# Patient Record
Sex: FEMALE | Race: BLACK OR AFRICAN AMERICAN | NOT HISPANIC OR LATINO | Employment: OTHER | URBAN - METROPOLITAN AREA
[De-identification: names, ages, dates, MRNs, and addresses within clinical notes are randomized per-mention and may not be internally consistent; named-entity substitution may affect disease eponyms.]

---

## 2017-01-04 ENCOUNTER — APPOINTMENT (OUTPATIENT)
Dept: PREADMISSION TESTING | Facility: HOSPITAL | Age: 57
End: 2017-01-04

## 2017-01-04 VITALS
HEART RATE: 93 BPM | OXYGEN SATURATION: 97 % | HEIGHT: 63 IN | SYSTOLIC BLOOD PRESSURE: 148 MMHG | BODY MASS INDEX: 50.43 KG/M2 | RESPIRATION RATE: 20 BRPM | TEMPERATURE: 98 F | DIASTOLIC BLOOD PRESSURE: 90 MMHG | WEIGHT: 284.6 LBS

## 2017-01-04 DIAGNOSIS — M25.561 CHRONIC PAIN OF RIGHT KNEE: Primary | ICD-10-CM

## 2017-01-04 DIAGNOSIS — G89.29 CHRONIC PAIN OF RIGHT KNEE: Primary | ICD-10-CM

## 2017-01-04 LAB
ANION GAP SERPL CALCULATED.3IONS-SCNC: 12.6 MMOL/L
BACTERIA UR QL AUTO: ABNORMAL /HPF
BILIRUB UR QL STRIP: NEGATIVE
BUN BLD-MCNC: 22 MG/DL (ref 6–20)
BUN/CREAT SERPL: 21.8 (ref 7–25)
CALCIUM SPEC-SCNC: 9.6 MG/DL (ref 8.6–10.5)
CHLORIDE SERPL-SCNC: 101 MMOL/L (ref 98–107)
CLARITY UR: CLEAR
CO2 SERPL-SCNC: 24.4 MMOL/L (ref 22–29)
COLOR UR: YELLOW
CREAT BLD-MCNC: 1.01 MG/DL (ref 0.57–1)
DEPRECATED RDW RBC AUTO: 40.5 FL (ref 37–54)
ERYTHROCYTE [DISTWIDTH] IN BLOOD BY AUTOMATED COUNT: 13.4 % (ref 11.7–13)
GFR SERPL CREATININE-BSD FRML MDRD: 68 ML/MIN/1.73
GLUCOSE BLD-MCNC: 187 MG/DL (ref 65–99)
GLUCOSE UR STRIP-MCNC: NEGATIVE MG/DL
HBA1C MFR BLD: 6.9 % (ref 4.8–5.6)
HCT VFR BLD AUTO: 42.9 % (ref 35.6–45.5)
HGB BLD-MCNC: 15.3 G/DL (ref 11.9–15.5)
HGB UR QL STRIP.AUTO: NEGATIVE
HYALINE CASTS UR QL AUTO: ABNORMAL /LPF
KETONES UR QL STRIP: NEGATIVE
LEUKOCYTE ESTERASE UR QL STRIP.AUTO: NEGATIVE
MCH RBC QN AUTO: 29.8 PG (ref 26.9–32)
MCHC RBC AUTO-ENTMCNC: 35.7 G/DL (ref 32.4–36.3)
MCV RBC AUTO: 83.5 FL (ref 80.5–98.2)
NITRITE UR QL STRIP: NEGATIVE
PH UR STRIP.AUTO: <=5 [PH] (ref 5–8)
PLATELET # BLD AUTO: 361 10*3/MM3 (ref 140–500)
PMV BLD AUTO: 10.3 FL (ref 6–12)
POTASSIUM BLD-SCNC: 4.2 MMOL/L (ref 3.5–5.2)
PROT UR QL STRIP: ABNORMAL
RBC # BLD AUTO: 5.14 10*6/MM3 (ref 3.9–5.2)
RBC # UR: ABNORMAL /HPF
REF LAB TEST METHOD: ABNORMAL
SODIUM BLD-SCNC: 138 MMOL/L (ref 136–145)
SP GR UR STRIP: 1.02 (ref 1–1.03)
SQUAMOUS #/AREA URNS HPF: ABNORMAL /HPF
UROBILINOGEN UR QL STRIP: ABNORMAL
WBC NRBC COR # BLD: 9.27 10*3/MM3 (ref 4.5–10.7)
WBC UR QL AUTO: ABNORMAL /HPF

## 2017-01-04 PROCEDURE — 85027 COMPLETE CBC AUTOMATED: CPT | Performed by: ORTHOPAEDIC SURGERY

## 2017-01-04 PROCEDURE — 97001: CPT

## 2017-01-04 PROCEDURE — 36415 COLL VENOUS BLD VENIPUNCTURE: CPT

## 2017-01-04 PROCEDURE — G8980 MOBILITY D/C STATUS: HCPCS

## 2017-01-04 PROCEDURE — G8979 MOBILITY GOAL STATUS: HCPCS

## 2017-01-04 PROCEDURE — 93010 ELECTROCARDIOGRAM REPORT: CPT | Performed by: INTERNAL MEDICINE

## 2017-01-04 PROCEDURE — G8978 MOBILITY CURRENT STATUS: HCPCS

## 2017-01-04 PROCEDURE — 83036 HEMOGLOBIN GLYCOSYLATED A1C: CPT | Performed by: ORTHOPAEDIC SURGERY

## 2017-01-04 PROCEDURE — 81001 URINALYSIS AUTO W/SCOPE: CPT | Performed by: ORTHOPAEDIC SURGERY

## 2017-01-04 PROCEDURE — 93005 ELECTROCARDIOGRAM TRACING: CPT

## 2017-01-04 PROCEDURE — 80048 BASIC METABOLIC PNL TOTAL CA: CPT | Performed by: ORTHOPAEDIC SURGERY

## 2017-01-04 RX ORDER — SPIRONOLACTONE 25 MG/1
25 TABLET ORAL NIGHTLY
COMMUNITY
End: 2018-03-13

## 2017-01-04 RX ORDER — SPIRONOLACTONE 25 MG/1
TABLET ORAL EVERY MORNING
COMMUNITY
End: 2021-05-25 | Stop reason: SDUPTHER

## 2017-01-04 RX ORDER — NIFEDIPINE 60 MG/1
60 TABLET, EXTENDED RELEASE ORAL 2 TIMES DAILY
COMMUNITY
End: 2018-03-13

## 2017-01-04 RX ORDER — MELOXICAM 15 MG/1
15 TABLET ORAL EVERY MORNING
COMMUNITY
End: 2017-02-23 | Stop reason: HOSPADM

## 2017-01-04 RX ORDER — FUROSEMIDE 20 MG/1
20 TABLET ORAL AS NEEDED
COMMUNITY
End: 2020-12-14 | Stop reason: SDUPTHER

## 2017-01-04 RX ORDER — CHLORHEXIDINE GLUCONATE 500 MG/1
CLOTH TOPICAL
COMMUNITY
End: 2017-02-23 | Stop reason: HOSPADM

## 2017-01-04 RX ORDER — CHLORAL HYDRATE 500 MG
1000 CAPSULE ORAL
COMMUNITY
End: 2020-09-29

## 2017-01-04 NOTE — DISCHARGE INSTRUCTIONS
2% CHLORAHEXIDINE GLUCONATE* CLOTH  Preparing or “prepping” skin before surgery can reduce the risk of infection at the surgical site. To make the process easier, [facility] has chosen disposable cloths moistened with a rinse-free, 2% Chlorhexidine Gluconate (CHG) antiseptic solution. The steps below outline the prepping process and should be carefully followed.        Use the prep cloth on the area that is circled in the diagram             Directions Night before Surgery  1) Shower using a fresh bar of anti-bacterial soap (such as Dial) and clean washcloth.  Use a clean towel to completely dry your skin.  2) Do not use any lotions, oils or creams on your skin.  3) Open the package and remove 1 cloth, wipe your skin for 30 seconds in a circular motion.  Allow to dry for 3 minutes.  4) Repeat #3 with second cloth.  5) Do not touch your eyes, ears, or mouth with the prep cloth.  6) Allow the wet prep solution to air dry.  7) Discard the prep cloth and wash your hands with soap and water.   8) Dress in clean bed clothes and sleep on fresh clean bed sheets.   9) You may experience some temporary itching after the prep.    Directions Day of Surgery  1) Repeat steps 1,2,3,4,5,6,7, and 9.   2) Dress in clean clothes before coming to the hospital.    BACTROBAN NASAL OINTMENT  There are many germs normally in your nose. Bactroban is an ointment that will help reduce these germs. Please follow these instructions for Bactroban use:    ____Two days before surgery in the evening Date________    ____The day before surgery in the morning  Date________    ____The day before surgery in the evening              Date________    ____The day of surgery in the morning    Date________    **Squirt ½ package of Bactroban Ointment onto a cotton applicator and apply to inside of 1st nostril.  Squirt the remaining Bactroban and apply to the inside of the other nostril.        Take the following medications the morning of  surgery with a small sip of water.  NIFEDICAL    ARRIVAL TIME 1000 TO MAIN OR       General Instructions:  • Do not eat or drink after midnight: includes water, mints, or gum. You may brush your teeth.  • Do not smoke, chew tobacco, or drink alcohol.  • Bring medications in original bottles, any inhalers and if applicable your C-PAP/ BI-PAP machine.  • Bring any papers given to you in the doctor’s office.  • Wear clean comfortable clothes and socks.  • Do not wear contact lenses or make-up.  Bring a case for your glasses if applicable.   • Bring crutches or walker if applicable.  • Leave all other valuables and jewelry at home.        Preventing a Surgical Site Infection:  Shower on the morning of surgery using a fresh bar of anti-bacterial soap (such as Dial) and clean washcloth.  Dry with a clean towel and dress in clean clothing.  For 2 to 3 days before surgery, avoid shaving with a razor near where you will have surgery because the razor can irritate skin and make it easier to develop an infection  Ask your surgeon if you will be receiving antibiotics prior to surgery  Make sure you, your family, and all healthcare providers clean their hands with soap and water or an alcohol based hand  before caring for you or your wound  If at all possible, quit smoking as many days before surgery as you can.    Day of surgery:  Upon arrival, a Pre-op nurse and Anesthesiologist will review your health history, obtain vital signs, and answer questions you may have.  The only belongings needed at this time will be your home medications and if applicable your C-PAP/BI-PAP machine.  If you are staying overnight your family can leave the rest of your belongings in the car and bring them to your room later.  A Pre-op nurse will start an IV and you may receive medication in preparation for surgery, including something to help you relax.  Your family will be able to see you in the Pre-op area.  While you are in surgery your  family should notify the waiting room  if they leave the waiting room area and provide a contact phone number.    Please be aware that surgery does come with discomfort.  We want to make every effort to control your discomfort so please discuss any uncontrolled symptoms with your nurse.   Your doctor will most likely have prescribed pain medications.      If you are going home after surgery you will receive individualized written care instructions before being discharged.  A responsible adult must drive you to and from the hospital on the day of your surgery and stay with you for 24 hours.    If you are staying overnight following surgery, you will be transported to your hospital room following the recovery period.  [facility] has all private rooms.    If you have any questions please call Pre-Admission Testing at [phone].  Deductibles and co-payments are collected on the day of service. Please be prepared to pay the required co-pay, deductible or deposit on the day of service as defined by your plan.

## 2017-01-04 NOTE — PROGRESS NOTES
Physical Therapy Outpatient Preoperative Total Joint Evaluation     Patient Name: Katharine Mitchell  : 1960  MRN: [id number]  Today's Date: 2017         Surgery Date: 17    There is no problem list on file for this patient.       No past medical history on file.     No past surgical history on file.           TOTAL JOINT PREOP EVAL (last 72 hours)      Total Joint Preop Evaluation       17 0740                Preoperative Evaluation    Surgery TKR: Right  -TV        Surgery Date 17  -TV        Previous Total Joint No  -TV        Attended Class yes  -TV        Medical History HTN;DM  -TV        Medical History Comment gb; scope on l knee  -TV        Prior Activity Status    All Household independent  -TV        All Community independent  -TV        Gait independent   cane  -TV        Transfers independent  -TV         Other Family  -TV        DC Plans Home  -TV        Assist at home Other Family  -TV        Home Environment 1 story;basement;lives first floor  -TV        Exterior steps 1 rail   3  -TV        Interior steps 1 rail   14  -TV        Pain 0-10    Pain Level 7  -TV        Location r knee  -TV        LE Measurements - ROM    Hip Flexion - Right AROM is WNL;Strength is grossly > or equal to 3/5  -TV        Hip Abduction - Right AROM is WNL;Strength is grossly > or equal to 3/5  -TV        Knee Flexion - Right Strength is grossly > or equal to 3/5   85  -TV        Knee Extension - Right Strength is grossly > or equal to 3/5   -10  -TV        Hip Flexion - Left AROM is WNL;Strength is grossly > or equal to 3/5  -TV        Hip Abduction - Left AROM is WNL;Strength is grossly > or equal to 3/5  -TV        Knee Flexion - Left Strength is grossly > or equal to 3/5   95  -TV        Knee Extension - Left Strength is grossly > or equal to 3/5   0  -TV        UE ROM/Strength    UE ROM/Strength adequate for walker use  -TV        Other Measurements    Sensation/Circulation intact   -TV        Gait Observation cane  -TV        Equipment    Equipment Patient Has Walker;Cane  -TV        ASSESSMENT    Goal Patient demonstrates good understanding of post-op P.T.;Exercises;Surgical Procedure  -TV        Goal Met? Yes  -TV        Anticipated Progress good  -TV        Patient agrees with Plan of Treatment? Yes  -TV        Plan Will see for post op TJR protocol  -TV          User Key  (r) = Recorded By, (t) = Taken By, (c) = Cosigned By    Initials Name Effective Dates    TV Taty Tai, PT 01/07/16 -              Time Calculation:          Therapy Charges for Today     Code Description Service Date Service Provider Modifiers Qty    99210134982 HC PT MOBILITY CURRENT 1/4/2017 Taty Tai, PT GP, CI 1    44933156801 HC PT MOBILITY PROJECTED 1/4/2017 Taty Tai, PT GP, CI 1    67037984958 HC PT MOBILITY DISCHARGE 1/4/2017 Taty Tai, PT GP, CI 1    71136041597 HC PAT-TOTAL JOINT CLASS 1/4/2017 Taty Tai, PT  1    39269641670 HC PT EVAL 1 1/4/2017 Taty Tai, PT GP 1            PT G-Codes  PT Professional Judgement Used?: Yes  Functional Limitation: Mobility: Walking and moving around  Mobility: Walking and Moving Around Current Status (): At least 1 percent but less than 20 percent impaired, limited or restricted  Mobility: Walking and Moving Around Goal Status (): At least 1 percent but less than 20 percent impaired, limited or restricted  Mobility: Walking and Moving Around Discharge Status (): At least 1 percent but less than 20 percent impaired, limited or restricted      Taty Tai, PT  1/4/2017

## 2017-01-04 NOTE — MR AVS SNAPSHOT
Katharine Mitchell   2017 7:00 AM   Appointment    Provider:  ADAM Loving   Department:  [facility] PREADMISSION T   Dept Phone:  [phone]                Your Full Care Plan              Your Updated Medication List          This list is accurate as of: 17  8:08 AM.  Always use your most recent med list.                Chlorhexidine Gluconate Cloth 2 % pads       fish oil 1000 MG capsule capsule       furosemide 20 MG tablet   Commonly known as:  LASIX       meloxicam 15 MG tablet   Commonly known as:  MOBIC       metFORMIN 500 MG tablet   Commonly known as:  GLUCOPHAGE       MULTIVITAMIN ADULT PO       mupirocin 2 % nasal ointment   Commonly known as:  BACTROBAN       NIFEdipine XL 60 MG 24 hr tablet   Commonly known as:  PROCARDIA XL       * spironolactone 25 MG tablet   Commonly known as:  ALDACTONE       * spironolactone 25 MG tablet   Commonly known as:  ALDACTONE       * Notice:  This list has 2 medication(s) that are the same as other medications prescribed for you. Read the directions carefully, and ask your doctor or other care provider to review them with you.            [employer] Signup     [facility] [employer] allows you to send messages to your doctor, view your test results, renew your prescriptions, schedule appointments, and more. To sign up, go to TRELYS and click on the Sign Up Now link in the New User? box. Enter your [employer] Activation Code exactly as it appears below along with the last four digits of your Social Security Number and your Date of Birth () to complete the sign-up process. If you do not sign up before the expiration date, you must request a new code.    [employer] Activation Code: [email or web address]  Expires: 2017  8:08 AM    If you have questions, you can email [email or web address] or call [phone] to talk to our [employer] staff. Remember, [employer] is NOT to be used for urgent needs. For medical  "emergencies, dial 911.               Other Info from Your Visit           Allergies     Zestril [Lisinopril] Swelling    LIP/ MOUTH SWELLING      Vital Signs     Blood Pressure Pulse Temperature Respirations Height Weight    148/90 (BP Location: Left arm, Patient Position: Sitting) 93 98 °F (36.7 °C) (Oral) 20 63\" (160 cm) 284 lb 9.6 oz (129 kg)    Oxygen Saturation Body Mass Index Smoking Status             97% 50.41 kg/m2 Never Smoker           Discharge Instructions         2% CHLORAHEXIDINE GLUCONATE* CLOTH  Preparing or “prepping” skin before surgery can reduce the risk of infection at the surgical site. To make the process easier, [facility] has chosen disposable cloths moistened with a rinse-free, 2% Chlorhexidine Gluconate (CHG) antiseptic solution. The steps below outline the prepping process and should be carefully followed.        Use the prep cloth on the area that is circled in the diagram             Directions Night before Surgery  1) Shower using a fresh bar of anti-bacterial soap (such as Dial) and clean washcloth.  Use a clean towel to completely dry your skin.  2) Do not use any lotions, oils or creams on your skin.  3) Open the package and remove 1 cloth, wipe your skin for 30 seconds in a circular motion.  Allow to dry for 3 minutes.  4) Repeat #3 with second cloth.  5) Do not touch your eyes, ears, or mouth with the prep cloth.  6) Allow the wet prep solution to air dry.  7) Discard the prep cloth and wash your hands with soap and water.   8) Dress in clean bed clothes and sleep on fresh clean bed sheets.   9) You may experience some temporary itching after the prep.    Directions Day of Surgery  1) Repeat steps 1,2,3,4,5,6,7, and 9.   2) Dress in clean clothes before coming to the hospital.    BACTROBAN NASAL OINTMENT  There are many germs normally in your nose. Bactroban is an ointment that will help reduce these germs. Please follow these instructions for Bactroban " use:    ____Two days before surgery in the evening Date________    ____The day before surgery in the morning  Date________    ____The day before surgery in the evening              Date________    ____The day of surgery in the morning    Date________    **Squirt ½ package of Bactroban Ointment onto a cotton applicator and apply to inside of 1st nostril.  Squirt the remaining Bactroban and apply to the inside of the other nostril.        Take the following medications the morning of surgery with a small sip of water.  NIFEDICAL    ARRIVAL TIME 1000 TO MAIN OR       General Instructions:  • Do not eat or drink after midnight: includes water, mints, or gum. You may brush your teeth.  • Do not smoke, chew tobacco, or drink alcohol.  • Bring medications in original bottles, any inhalers and if applicable your C-PAP/ BI-PAP machine.  • Bring any papers given to you in the doctor’s office.  • Wear clean comfortable clothes and socks.  • Do not wear contact lenses or make-up.  Bring a case for your glasses if applicable.   • Bring crutches or walker if applicable.  • Leave all other valuables and jewelry at home.        Preventing a Surgical Site Infection:  Shower on the morning of surgery using a fresh bar of anti-bacterial soap (such as Dial) and clean washcloth.  Dry with a clean towel and dress in clean clothing.  For 2 to 3 days before surgery, avoid shaving with a razor near where you will have surgery because the razor can irritate skin and make it easier to develop an infection  Ask your surgeon if you will be receiving antibiotics prior to surgery  Make sure you, your family, and all healthcare providers clean their hands with soap and water or an alcohol based hand  before caring for you or your wound  If at all possible, quit smoking as many days before surgery as you can.    Day of surgery:  Upon arrival, a Pre-op nurse and Anesthesiologist will review your health history, obtain vital signs, and  answer questions you may have.  The only belongings needed at this time will be your home medications and if applicable your C-PAP/BI-PAP machine.  If you are staying overnight your family can leave the rest of your belongings in the car and bring them to your room later.  A Pre-op nurse will start an IV and you may receive medication in preparation for surgery, including something to help you relax.  Your family will be able to see you in the Pre-op area.  While you are in surgery your family should notify the waiting room  if they leave the waiting room area and provide a contact phone number.    Please be aware that surgery does come with discomfort.  We want to make every effort to control your discomfort so please discuss any uncontrolled symptoms with your nurse.   Your doctor will most likely have prescribed pain medications.      If you are going home after surgery you will receive individualized written care instructions before being discharged.  A responsible adult must drive you to and from the hospital on the day of your surgery and stay with you for 24 hours.    If you are staying overnight following surgery, you will be transported to your hospital room following the recovery period.  [facility] has all private rooms.    If you have any questions please call Pre-Admission Testing at [phone].  Deductibles and co-payments are collected on the day of service. Please be prepared to pay the required co-pay, deductible or deposit on the day of service as defined by your plan.       SYMPTOMS OF A STROKE    Call [phone] or have someone take you to the Emergency Department if you have any of the following:    · Sudden numbness or weakness of your face, arm or leg especially on one side of the body  · Sudden confusion, diffiiculty speaking or trouble understanding   · Changes in your vision or loss of sight in one eye  · Sudden severe headache with no known cause  · sudden dizziness,  trouble walking, loss of balance or coordination    It is important to seek emergency care right away if you have further stroke symptoms. If you get emergency help quickly, the powerful clot-dissolving medicines can reduce the disabilities caused by a stroke.     For more information:    American Stroke Association  9-914-8-STROKE  www.strokeassociation.org           IF YOU SMOKE OR USE TOBACCO PLEASE READ THE FOLLOWING:    Why is smoking bad for me?  Smoking increases the risk of heart disease, lung disease, vascular disease, stroke, and cancer.     If you smoke, STOP!    If you would like more information on quitting smoking, please visit the [employer] website: [email or web address]   This link will provide additional resources including the QUIT line and the Beat the Pack support groups.     For more information:    American Cancer Society  (923) 674-8783    American Heart Association  4-727-433-1034

## 2017-01-09 ENCOUNTER — TRANSCRIBE ORDERS (OUTPATIENT)
Dept: ADMINISTRATIVE | Facility: HOSPITAL | Age: 57
End: 2017-01-09

## 2017-01-09 ENCOUNTER — LAB (OUTPATIENT)
Dept: LAB | Facility: HOSPITAL | Age: 57
End: 2017-01-09
Attending: ORTHOPAEDIC SURGERY

## 2017-01-09 ENCOUNTER — OFFICE VISIT (OUTPATIENT)
Dept: CARDIOLOGY | Facility: CLINIC | Age: 57
End: 2017-01-09

## 2017-01-09 VITALS
HEIGHT: 63 IN | BODY MASS INDEX: 50.32 KG/M2 | SYSTOLIC BLOOD PRESSURE: 124 MMHG | DIASTOLIC BLOOD PRESSURE: 76 MMHG | HEART RATE: 91 BPM | WEIGHT: 284 LBS

## 2017-01-09 DIAGNOSIS — R06.02 SOB (SHORTNESS OF BREATH): ICD-10-CM

## 2017-01-09 DIAGNOSIS — I10 ESSENTIAL HYPERTENSION: ICD-10-CM

## 2017-01-09 DIAGNOSIS — R89.9 ABNORMAL LABORATORY TEST: Primary | ICD-10-CM

## 2017-01-09 DIAGNOSIS — R89.9 ABNORMAL LABORATORY TEST: ICD-10-CM

## 2017-01-09 DIAGNOSIS — Z01.810 PREOP CARDIOVASCULAR EXAM: ICD-10-CM

## 2017-01-09 DIAGNOSIS — R94.31 ABNORMAL EKG: ICD-10-CM

## 2017-01-09 DIAGNOSIS — Z01.810 PREOP CARDIOVASCULAR EXAM: Primary | ICD-10-CM

## 2017-01-09 DIAGNOSIS — R94.31 ABNORMAL EKG: Primary | ICD-10-CM

## 2017-01-09 LAB
BACTERIA UR QL AUTO: ABNORMAL /HPF
BILIRUB UR QL STRIP: NEGATIVE
CLARITY UR: CLEAR
COLOR UR: ABNORMAL
GLUCOSE UR STRIP-MCNC: NEGATIVE MG/DL
HGB UR QL STRIP.AUTO: NEGATIVE
HYALINE CASTS UR QL AUTO: ABNORMAL /LPF
KETONES UR QL STRIP: ABNORMAL
LEUKOCYTE ESTERASE UR QL STRIP.AUTO: ABNORMAL
NITRITE UR QL STRIP: NEGATIVE
PH UR STRIP.AUTO: <=5 [PH] (ref 5–8)
PROT UR QL STRIP: ABNORMAL
RBC # UR: ABNORMAL /HPF
REF LAB TEST METHOD: ABNORMAL
SP GR UR STRIP: 1.03 (ref 1–1.03)
SQUAMOUS #/AREA URNS HPF: ABNORMAL /HPF
UROBILINOGEN UR QL STRIP: ABNORMAL
WBC UR QL AUTO: ABNORMAL /HPF

## 2017-01-09 PROCEDURE — 99213 OFFICE O/P EST LOW 20 MIN: CPT | Performed by: INTERNAL MEDICINE

## 2017-01-09 PROCEDURE — 81001 URINALYSIS AUTO W/SCOPE: CPT

## 2017-01-09 NOTE — MR AVS SNAPSHOT
Katharine Mitchell   1/9/2017 3:30 PM   Office Visit    Dept Phone:  [phone]   Encounter #:  [id number]    Provider:  Adrian Pottre MD   Department:  [facility]                Your Full Care Plan              Your Updated Medication List          This list is accurate as of: 1/9/17  4:43 PM.  Always use your most recent med list.                Chlorhexidine Gluconate Cloth 2 % pads       fish oil 1000 MG capsule capsule       furosemide 20 MG tablet   Commonly known as:  LASIX       meloxicam 15 MG tablet   Commonly known as:  MOBIC       metFORMIN 500 MG tablet   Commonly known as:  GLUCOPHAGE       MULTIVITAMIN ADULT PO       mupirocin 2 % nasal ointment   Commonly known as:  BACTROBAN       NIFEdipine XL 60 MG 24 hr tablet   Commonly known as:  PROCARDIA XL       * spironolactone 25 MG tablet   Commonly known as:  ALDACTONE       * spironolactone 25 MG tablet   Commonly known as:  ALDACTONE       WAL-DRYL ALLERGY PO       * Notice:  This list has 2 medication(s) that are the same as other medications prescribed for you. Read the directions carefully, and ask your doctor or other care provider to review them with you.            You Were Diagnosed With        Codes Comments    Abnormal EKG    -  Primary ICD-10-CM: R94.31  ICD-9-CM: 794.31     Essential hypertension     ICD-10-CM: I10  ICD-9-CM: 401.9     Preop cardiovascular exam     ICD-10-CM: Z01.810  ICD-9-CM: V72.81       Instructions     None    Patient Instructions History      Upcoming Appointments     Visit Type Date Time Department    NEW PATIENT 1/9/2017  3:30 PM [facility]    LAB 1/9/2017  4:15 PM  [facility]      GT[employer] Signup     [facility] [employer] allows you to send messages to your doctor, view your test results, renew your prescriptions, schedule appointments, and more. To sign up, go to [email or web address] and click on the Sign Up Now  "link in the New User? box. Enter your [employer] Activation Code exactly as it appears below along with the last four digits of your Social Security Number and your Date of Birth () to complete the sign-up process. If you do not sign up before the expiration date, you must request a new code.    [employer] Activation Code: [email or web address]  Expires: 2017  8:08 AM    If you have questions, you can email [email or web address] or call [phone] to talk to our [employer] staff. Remember, [employer] is NOT to be used for urgent needs. For medical emergencies, dial 911.               Other Info from Your Visit           Allergies     Zestril [Lisinopril]  Swelling    LIP/ MOUTH SWELLING      Reason for Visit     Initial Evaluation Abnormal ECG      Vital Signs     Blood Pressure Pulse Height Weight Body Mass Index Smoking Status    124/76 91 63\" (160 cm) 284 lb (129 kg) 50.31 kg/m2 Never Smoker      Problems and Diagnoses Noted     Abnormal EKG    High blood pressure    Pre-operative cardiovascular examination        "

## 2017-01-09 NOTE — PROGRESS NOTES
" Subjective:       Katharine Mitchell is a 56 y.o. female who here for follow up    CC  Preop clearance for the knee surgery  HPI  56-year-old white female with known history of the hypertension, obesity, needs a knee surgery, as a preop evaluation patient was found to have abnormal EKG, patient has been referred to us for the further workup, also risk factors includes the diabetes and sleep apnea     Problem List Items Addressed This Visit        Cardiovascular and Mediastinum    Essential hypertension       Other    Abnormal EKG - Primary    Preop cardiovascular exam        Previous treatments/evaluations include: ASA and beta blocker. Cardiac risk factors: advanced age (older than 55 for men, 65 for women) and hypertension.    The following portions of the patient's history were reviewed and updated as appropriate: allergies, current medications, past family history, past medical history, past social history, past surgical history and problem list.    Past Medical History   Diagnosis Date   • Arthritis    • Diabetes mellitus    • Hypertension    • Sleep apnea      DOES NOT WEAR CPAP    reports that she has never smoked. She does not have any smokeless tobacco history on file. She reports that she drinks alcohol. She reports that she does not use illicit drugs.No family history on file.    Review of Systems  Constitutional: No wt loss, fever, fatigue  Gastrointestinal: No nausea, abdominal pain  Behavioral/Psych: No insomnia or anxiety   Cardiovascular no chest pains or tightness in the chest  Objective:       Physical Exam           Physical Exam  Visit Vitals   • /76   • Pulse 91   • Ht 63\" (160 cm)   • Wt 284 lb (129 kg)   • BMI 50.31 kg/m2     General appearance: alert, appears stated age and cooperative, oriented x 3, morbidly obese  Neck: no JVD and supple, symmetrical, trachea midline  Lungs: clear to auscultation bilaterally  Heart:Normal PMI,  S1, S2 normal, no murmur, rub or gallop  Extremities: " normal range of motion, no cyanosis or edema,  Pulses: 2+ and symmetric  Skin: Skin color, texture, turgor normal. No rashes or lesions  Psych:  Pleasant and cooperative        Cardiographics  @Procedures    Echocardiogram:        Current Outpatient Prescriptions:   •  Chlorhexidine Gluconate Cloth 2 % pads, Apply  topically. PRIOR TO OR, Disp: , Rfl:   •  DiphenhydrAMINE HCl (WAL-DRYL ALLERGY PO), Take 1 tablet by mouth Daily., Disp: , Rfl:   •  furosemide (LASIX) 20 MG tablet, Take 20 mg by mouth As Needed (PRN SWELLING)., Disp: , Rfl:   •  meloxicam (MOBIC) 15 MG tablet, Take 15 mg by mouth Every Morning. HELD FOR OR, Disp: , Rfl:   •  metFORMIN (GLUCOPHAGE) 500 MG tablet, Take 500 mg by mouth 2 (Two) Times a Day With Meals., Disp: , Rfl:   •  Multiple Vitamins-Minerals (MULTIVITAMIN ADULT PO), Take 1 tablet by mouth Every Morning. HELD FOR OR, Disp: , Rfl:   •  mupirocin (BACTROBAN) 2 % nasal ointment, into each nostril 2 (Two) Times a Day. X3 DOSES PRIOR TO OR, Disp: , Rfl:   •  NIFEdipine XL (PROCARDIA XL) 60 MG 24 hr tablet, Take 60 mg by mouth 2 (Two) Times a Day., Disp: , Rfl:   •  Omega-3 Fatty Acids (FISH OIL) 1000 MG capsule capsule, Take 1,000 mg by mouth 2 (Two) Times a Day With Meals. HELD FOR OR, Disp: , Rfl:   •  spironolactone (ALDACTONE) 25 MG tablet, Take 50 mg by mouth Every Morning., Disp: , Rfl:   •  spironolactone (ALDACTONE) 25 MG tablet, Take 25 mg by mouth Every Night., Disp: , Rfl:    Assessment:        Patient Active Problem List   Diagnosis   • Abnormal EKG   • Essential hypertension   • Preop cardiovascular exam               Plan:       56-year-old -American female moderately obese, diabetes as well as some hypertension with significant cardiac risk factors with abnormal EKG will need the Lexiscan Cardiolite as well as echocardiogram prior to the surgery      ICD-10-CM ICD-9-CM   1. Abnormal EKG R94.31 794.31   2. Essential hypertension I10 401.9   3. Preop cardiovascular exam  Z01.810 V72.81     Will need LEXISCAN cardiolyte and echo  Prior to knee surg  COUNSELING:    Katharine Wilkerson was given to patient for the following topics: diagnostic results, risk factor reductions, impressions, risks and benefits of treatment options and importance of treatment compliance .       SMOKING COUNSELING:    Counseling given: Not Answered      EMR Dragon/Transcription disclaimer:   Much of this encounter note is an electronic transcription/translation of spoken language to printed text. The electronic translation of spoken language may permit erroneous, or at times, nonsensical words or phrases to be inadvertently transcribed; Although I have reviewed the note for such errors, some may still exist.

## 2017-01-18 ENCOUNTER — HOSPITAL ENCOUNTER (OUTPATIENT)
Dept: CARDIOLOGY | Facility: HOSPITAL | Age: 57
Discharge: HOME | End: 2017-01-18
Attending: INTERNAL MEDICINE | Admitting: INTERNAL MEDICINE

## 2017-01-18 ENCOUNTER — HOSPITAL ENCOUNTER (OUTPATIENT)
Dept: CARDIOLOGY | Facility: HOSPITAL | Age: 57
Discharge: HOME | End: 2017-01-18
Attending: INTERNAL MEDICINE

## 2017-01-18 ENCOUNTER — TRANSCRIBE ORDERS (OUTPATIENT)
Dept: ADMINISTRATIVE | Facility: HOSPITAL | Age: 57
End: 2017-01-18

## 2017-01-18 ENCOUNTER — LAB (OUTPATIENT)
Dept: LAB | Facility: HOSPITAL | Age: 57
End: 2017-01-18
Attending: ORTHOPAEDIC SURGERY

## 2017-01-18 VITALS
DIASTOLIC BLOOD PRESSURE: 76 MMHG | WEIGHT: 284 LBS | BODY MASS INDEX: 50.32 KG/M2 | SYSTOLIC BLOOD PRESSURE: 124 MMHG | HEIGHT: 63 IN

## 2017-01-18 VITALS — DIASTOLIC BLOOD PRESSURE: 85 MMHG | HEART RATE: 69 BPM | SYSTOLIC BLOOD PRESSURE: 130 MMHG

## 2017-01-18 DIAGNOSIS — N39.0 URINARY TRACT INFECTION, SITE UNSPECIFIED: Primary | ICD-10-CM

## 2017-01-18 DIAGNOSIS — R94.31 ABNORMAL EKG: ICD-10-CM

## 2017-01-18 DIAGNOSIS — R06.02 SOB (SHORTNESS OF BREATH): ICD-10-CM

## 2017-01-18 DIAGNOSIS — Z01.810 PREOP CARDIOVASCULAR EXAM: ICD-10-CM

## 2017-01-18 DIAGNOSIS — N39.0 URINARY TRACT INFECTION, SITE UNSPECIFIED: ICD-10-CM

## 2017-01-18 LAB
BACTERIA UR QL AUTO: ABNORMAL /HPF
BH CV ECHO MEAS - ACS: 1.8 CM
BH CV ECHO MEAS - AO MAX PG (FULL): 5.3 MMHG
BH CV ECHO MEAS - AO MAX PG: 10.1 MMHG
BH CV ECHO MEAS - AO MEAN PG (FULL): 3 MMHG
BH CV ECHO MEAS - AO MEAN PG: 6 MMHG
BH CV ECHO MEAS - AO ROOT AREA (BSA CORRECTED): 1.3
BH CV ECHO MEAS - AO ROOT AREA: 7.1 CM^2
BH CV ECHO MEAS - AO ROOT DIAM: 3 CM
BH CV ECHO MEAS - AO V2 MAX: 159 CM/SEC
BH CV ECHO MEAS - AO V2 MEAN: 109 CM/SEC
BH CV ECHO MEAS - AO V2 VTI: 29.6 CM
BH CV ECHO MEAS - AVA(I,A): 2.7 CM^2
BH CV ECHO MEAS - AVA(I,D): 2.7 CM^2
BH CV ECHO MEAS - AVA(V,A): 2.6 CM^2
BH CV ECHO MEAS - AVA(V,D): 2.6 CM^2
BH CV ECHO MEAS - BSA(HAYCOCK): 2.5 M^2
BH CV ECHO MEAS - BSA: 2.2 M^2
BH CV ECHO MEAS - BZI_BMI: 50.3 KILOGRAMS/M^2
BH CV ECHO MEAS - BZI_METRIC_HEIGHT: 160 CM
BH CV ECHO MEAS - BZI_METRIC_WEIGHT: 128.8 KG
BH CV ECHO MEAS - CONTRAST EF 4CH: 56.5 ML/M^2
BH CV ECHO MEAS - EDV(CUBED): 148.9 ML
BH CV ECHO MEAS - EDV(MOD-SP4): 85 ML
BH CV ECHO MEAS - EDV(TEICH): 135.3 ML
BH CV ECHO MEAS - EF(CUBED): 75.9 %
BH CV ECHO MEAS - EF(MOD-SP4): 56.5 %
BH CV ECHO MEAS - EF(TEICH): 67.4 %
BH CV ECHO MEAS - ESV(CUBED): 35.9 ML
BH CV ECHO MEAS - ESV(MOD-SP4): 37 ML
BH CV ECHO MEAS - ESV(TEICH): 44.1 ML
BH CV ECHO MEAS - FS: 37.7 %
BH CV ECHO MEAS - IVS/LVPW: 1
BH CV ECHO MEAS - IVSD: 1.1 CM
BH CV ECHO MEAS - LA DIMENSION: 4.3 CM
BH CV ECHO MEAS - LA/AO: 1.4
BH CV ECHO MEAS - LAT PEAK E' VEL: 8.7 CM/SEC
BH CV ECHO MEAS - LV DIASTOLIC VOL/BSA (35-75): 37.9 ML/M^2
BH CV ECHO MEAS - LV MASS(C)D: 227.7 GRAMS
BH CV ECHO MEAS - LV MASS(C)DI: 101.5 GRAMS/M^2
BH CV ECHO MEAS - LV MAX PG: 4.8 MMHG
BH CV ECHO MEAS - LV MEAN PG: 3 MMHG
BH CV ECHO MEAS - LV SYSTOLIC VOL/BSA (12-30): 16.5 ML/M^2
BH CV ECHO MEAS - LV V1 MAX: 110 CM/SEC
BH CV ECHO MEAS - LV V1 MEAN: 75.5 CM/SEC
BH CV ECHO MEAS - LV V1 VTI: 21 CM
BH CV ECHO MEAS - LVIDD: 5.3 CM
BH CV ECHO MEAS - LVIDS: 3.3 CM
BH CV ECHO MEAS - LVLD AP4: 6.7 CM
BH CV ECHO MEAS - LVLS AP4: 6.2 CM
BH CV ECHO MEAS - LVOT AREA (M): 3.8 CM^2
BH CV ECHO MEAS - LVOT AREA: 3.8 CM^2
BH CV ECHO MEAS - LVOT DIAM: 2.2 CM
BH CV ECHO MEAS - LVPWD: 1.1 CM
BH CV ECHO MEAS - MED PEAK E' VEL: 4.4 CM/SEC
BH CV ECHO MEAS - MR MAX PG: 110.7 MMHG
BH CV ECHO MEAS - MR MAX VEL: 526 CM/SEC
BH CV ECHO MEAS - MV A DUR: 0.18 SEC
BH CV ECHO MEAS - MV A MAX VEL: 112 CM/SEC
BH CV ECHO MEAS - MV DEC SLOPE: 234 CM/SEC^2
BH CV ECHO MEAS - MV DEC TIME: 0.27 SEC
BH CV ECHO MEAS - MV E MAX VEL: 61.1 CM/SEC
BH CV ECHO MEAS - MV E/A: 0.55
BH CV ECHO MEAS - MV MAX PG: 4.6 MMHG
BH CV ECHO MEAS - MV MEAN PG: 1 MMHG
BH CV ECHO MEAS - MV P1/2T MAX VEL: 63.5 CM/SEC
BH CV ECHO MEAS - MV P1/2T: 79.5 MSEC
BH CV ECHO MEAS - MV V2 MAX: 107 CM/SEC
BH CV ECHO MEAS - MV V2 MEAN: 53.7 CM/SEC
BH CV ECHO MEAS - MV V2 VTI: 24.9 CM
BH CV ECHO MEAS - MVA P1/2T LCG: 3.5 CM^2
BH CV ECHO MEAS - MVA(P1/2T): 2.8 CM^2
BH CV ECHO MEAS - MVA(VTI): 3.2 CM^2
BH CV ECHO MEAS - PA MAX PG (FULL): 3.7 MMHG
BH CV ECHO MEAS - PA MAX PG: 4.8 MMHG
BH CV ECHO MEAS - PA V2 MAX: 110 CM/SEC
BH CV ECHO MEAS - PULM A REVS DUR: 0.14 SEC
BH CV ECHO MEAS - PULM A REVS VEL: 28.9 CM/SEC
BH CV ECHO MEAS - PULM DIAS VEL: 31.2 CM/SEC
BH CV ECHO MEAS - PULM S/D: 1.2
BH CV ECHO MEAS - PULM SYS VEL: 38.1 CM/SEC
BH CV ECHO MEAS - PVA(V,A): 1.7 CM^2
BH CV ECHO MEAS - PVA(V,D): 1.7 CM^2
BH CV ECHO MEAS - QP/QS: 0.48
BH CV ECHO MEAS - RAP SYSTOLE: 10 MMHG
BH CV ECHO MEAS - RV MAX PG: 1.1 MMHG
BH CV ECHO MEAS - RV MEAN PG: 1 MMHG
BH CV ECHO MEAS - RV V1 MAX: 52.5 CM/SEC
BH CV ECHO MEAS - RV V1 MEAN: 42.2 CM/SEC
BH CV ECHO MEAS - RV V1 VTI: 11 CM
BH CV ECHO MEAS - RVDD: 2.4 CM
BH CV ECHO MEAS - RVOT AREA: 3.5 CM^2
BH CV ECHO MEAS - RVOT DIAM: 2.1 CM
BH CV ECHO MEAS - RVSP: 29.7 MMHG
BH CV ECHO MEAS - SI(AO): 93.2 ML/M^2
BH CV ECHO MEAS - SI(CUBED): 50.3 ML/M^2
BH CV ECHO MEAS - SI(LVOT): 35.6 ML/M^2
BH CV ECHO MEAS - SI(MOD-SP4): 21.4 ML/M^2
BH CV ECHO MEAS - SI(TEICH): 40.6 ML/M^2
BH CV ECHO MEAS - SV(AO): 209.2 ML
BH CV ECHO MEAS - SV(CUBED): 112.9 ML
BH CV ECHO MEAS - SV(LVOT): 79.8 ML
BH CV ECHO MEAS - SV(MOD-SP4): 48 ML
BH CV ECHO MEAS - SV(RVOT): 38.1 ML
BH CV ECHO MEAS - SV(TEICH): 91.2 ML
BH CV ECHO MEAS - TAPSE (>1.6): 2.1 CM2
BH CV ECHO MEAS - TR MAX VEL: 222 CM/SEC
BH CV NUCLEAR PRIOR STUDY: 2
BH CV STRESS COMMENTS STAGE 1: NORMAL
BH CV STRESS COMMENTS STAGE 2: NORMAL
BH CV STRESS DOSE REGADENOSON STAGE 1: 0.4
BH CV STRESS DURATION MIN STAGE 1: 0
BH CV STRESS DURATION MIN STAGE 2: 4
BH CV STRESS DURATION SEC STAGE 1: 15
BH CV STRESS DURATION SEC STAGE 2: 0
BH CV STRESS HR STAGE 1: 100
BH CV STRESS PROTOCOL 1: NORMAL
BH CV STRESS RECOVERY BP: NORMAL MMHG
BH CV STRESS RECOVERY HR: 87 BPM
BH CV STRESS STAGE 1: 1
BH CV STRESS STAGE 2: 2
BH CV XLRA - RV BASE: 3.1 CM
BH CV XLRA - RV LENGTH: 6.4 CM
BH CV XLRA - RV MID: 2.6 CM
BH CV XLRA - TDI S': 15.2 CM/SEC
BILIRUB UR QL STRIP: NEGATIVE
CLARITY UR: CLEAR
COLOR UR: YELLOW
GLUCOSE UR STRIP-MCNC: NEGATIVE MG/DL
HGB UR QL STRIP.AUTO: NEGATIVE
HYALINE CASTS UR QL AUTO: ABNORMAL /LPF
KETONES UR QL STRIP: NEGATIVE
LEFT ATRIUM VOLUME INDEX: 35.3 ML/M2
LEUKOCYTE ESTERASE UR QL STRIP.AUTO: ABNORMAL
LV EF NUC BP: 59 %
MAXIMAL PREDICTED HEART RATE: 163 BPM
NITRITE UR QL STRIP: NEGATIVE
PERCENT MAX PREDICTED HR: 61.35 %
PH UR STRIP.AUTO: 5.5 [PH] (ref 5–8)
PROT UR QL STRIP: ABNORMAL
RBC # UR: ABNORMAL /HPF
REF LAB TEST METHOD: ABNORMAL
SP GR UR STRIP: 1.02 (ref 1–1.03)
SQUAMOUS #/AREA URNS HPF: ABNORMAL /HPF
STRESS BASELINE BP: NORMAL MMHG
STRESS BASELINE HR: 71 BPM
STRESS PERCENT HR: 72 %
STRESS POST ESTIMATED WORKLOAD: 1 METS
STRESS POST EXERCISE DUR MIN: 1 MIN
STRESS POST EXERCISE DUR SEC: 0 SEC
STRESS POST PEAK BP: NORMAL MMHG
STRESS POST PEAK HR: 100 BPM
STRESS TARGET HR: 139 BPM
UROBILINOGEN UR QL STRIP: ABNORMAL
WBC UR QL AUTO: ABNORMAL /HPF

## 2017-01-18 PROCEDURE — 81001 URINALYSIS AUTO W/SCOPE: CPT

## 2017-01-18 PROCEDURE — 78452 HT MUSCLE IMAGE SPECT MULT: CPT

## 2017-01-18 PROCEDURE — 0 TECHNETIUM SESTAMIBI: Performed by: INTERNAL MEDICINE

## 2017-01-18 PROCEDURE — 78452 HT MUSCLE IMAGE SPECT MULT: CPT | Performed by: INTERNAL MEDICINE

## 2017-01-18 PROCEDURE — 0399T HC MYOCARDL STRAIN IMAG QUAN ASSMT PER SESS: CPT

## 2017-01-18 PROCEDURE — 0399T ADULT TRANSTHORACIC ECHO COMPLETE: CPT | Performed by: INTERNAL MEDICINE

## 2017-01-18 PROCEDURE — 93017 CV STRESS TEST TRACING ONLY: CPT

## 2017-01-18 PROCEDURE — 93306 TTE W/DOPPLER COMPLETE: CPT

## 2017-01-18 PROCEDURE — A9500 TC99M SESTAMIBI: HCPCS | Performed by: INTERNAL MEDICINE

## 2017-01-18 PROCEDURE — 25010000002 REGADENOSON 0.4 MG/5ML SOLUTION: Performed by: INTERNAL MEDICINE

## 2017-01-18 PROCEDURE — 93016 CV STRESS TEST SUPVJ ONLY: CPT | Performed by: INTERNAL MEDICINE

## 2017-01-18 PROCEDURE — 93306 TTE W/DOPPLER COMPLETE: CPT | Performed by: INTERNAL MEDICINE

## 2017-01-18 PROCEDURE — 87086 URINE CULTURE/COLONY COUNT: CPT

## 2017-01-18 PROCEDURE — 93018 CV STRESS TEST I&R ONLY: CPT | Performed by: INTERNAL MEDICINE

## 2017-01-18 RX ADMIN — Medication 1 DOSE: at 10:36

## 2017-01-18 RX ADMIN — REGADENOSON 0.4 MG: 0.08 INJECTION, SOLUTION INTRAVENOUS at 10:36

## 2017-01-18 RX ADMIN — Medication 1 DOSE: at 08:45

## 2017-01-19 ENCOUNTER — TELEPHONE (OUTPATIENT)
Dept: CARDIOLOGY | Facility: CLINIC | Age: 57
End: 2017-01-19

## 2017-01-19 NOTE — TELEPHONE ENCOUNTER
----- Message from Etta Lea sent at 1/19/2017  3:58 PM EST -----  Regarding: stress results  Contact: [phone]  Pt states she was told she could get her results today from dr medina???      1/19/17 CALLED PATIENT GAVE RESULTS AND SHE WAS CLEARED FOR SURGERY

## 2017-01-20 ENCOUNTER — DOCUMENTATION (OUTPATIENT)
Dept: CARDIOLOGY | Facility: CLINIC | Age: 57
End: 2017-01-20

## 2017-01-20 LAB — BACTERIA SPEC AEROBE CULT: NO GROWTH

## 2017-02-10 ENCOUNTER — APPOINTMENT (OUTPATIENT)
Dept: PREADMISSION TESTING | Facility: HOSPITAL | Age: 57
End: 2017-02-10

## 2017-02-10 VITALS
WEIGHT: 286 LBS | TEMPERATURE: 98.7 F | HEIGHT: 63 IN | RESPIRATION RATE: 16 BRPM | DIASTOLIC BLOOD PRESSURE: 88 MMHG | OXYGEN SATURATION: 95 % | BODY MASS INDEX: 50.68 KG/M2 | SYSTOLIC BLOOD PRESSURE: 134 MMHG | HEART RATE: 80 BPM

## 2017-02-10 LAB
ANION GAP SERPL CALCULATED.3IONS-SCNC: 16.4 MMOL/L
BACTERIA UR QL AUTO: ABNORMAL /HPF
BILIRUB UR QL STRIP: NEGATIVE
BUN BLD-MCNC: 25 MG/DL (ref 6–20)
BUN/CREAT SERPL: 24.5 (ref 7–25)
CALCIUM SPEC-SCNC: 9.5 MG/DL (ref 8.6–10.5)
CHLORIDE SERPL-SCNC: 101 MMOL/L (ref 98–107)
CLARITY UR: CLEAR
CO2 SERPL-SCNC: 21.6 MMOL/L (ref 22–29)
COLOR UR: YELLOW
CREAT BLD-MCNC: 1.02 MG/DL (ref 0.57–1)
DEPRECATED RDW RBC AUTO: 42.6 FL (ref 37–54)
ERYTHROCYTE [DISTWIDTH] IN BLOOD BY AUTOMATED COUNT: 14 % (ref 11.7–13)
GFR SERPL CREATININE-BSD FRML MDRD: 68 ML/MIN/1.73
GLUCOSE BLD-MCNC: 142 MG/DL (ref 65–99)
GLUCOSE UR STRIP-MCNC: NEGATIVE MG/DL
HBA1C MFR BLD: 6.36 % (ref 4.8–5.6)
HCT VFR BLD AUTO: 42.1 % (ref 35.6–45.5)
HGB BLD-MCNC: 14.9 G/DL (ref 11.9–15.5)
HGB UR QL STRIP.AUTO: NEGATIVE
HYALINE CASTS UR QL AUTO: ABNORMAL /LPF
KETONES UR QL STRIP: NEGATIVE
LEUKOCYTE ESTERASE UR QL STRIP.AUTO: ABNORMAL
MCH RBC QN AUTO: 29.9 PG (ref 26.9–32)
MCHC RBC AUTO-ENTMCNC: 35.4 G/DL (ref 32.4–36.3)
MCV RBC AUTO: 84.4 FL (ref 80.5–98.2)
NITRITE UR QL STRIP: NEGATIVE
PH UR STRIP.AUTO: 5.5 [PH] (ref 5–8)
PLATELET # BLD AUTO: 321 10*3/MM3 (ref 140–500)
PMV BLD AUTO: 9.8 FL (ref 6–12)
POTASSIUM BLD-SCNC: 4.4 MMOL/L (ref 3.5–5.2)
PROT UR QL STRIP: ABNORMAL
RBC # BLD AUTO: 4.99 10*6/MM3 (ref 3.9–5.2)
RBC # UR: ABNORMAL /HPF
REF LAB TEST METHOD: ABNORMAL
SODIUM BLD-SCNC: 139 MMOL/L (ref 136–145)
SP GR UR STRIP: 1.02 (ref 1–1.03)
SQUAMOUS #/AREA URNS HPF: ABNORMAL /HPF
UROBILINOGEN UR QL STRIP: ABNORMAL
WBC NRBC COR # BLD: 12.28 10*3/MM3 (ref 4.5–10.7)
WBC UR QL AUTO: ABNORMAL /HPF

## 2017-02-10 PROCEDURE — 80048 BASIC METABOLIC PNL TOTAL CA: CPT | Performed by: ORTHOPAEDIC SURGERY

## 2017-02-10 PROCEDURE — 36415 COLL VENOUS BLD VENIPUNCTURE: CPT

## 2017-02-10 PROCEDURE — 85027 COMPLETE CBC AUTOMATED: CPT | Performed by: ORTHOPAEDIC SURGERY

## 2017-02-10 PROCEDURE — 81001 URINALYSIS AUTO W/SCOPE: CPT | Performed by: ORTHOPAEDIC SURGERY

## 2017-02-10 PROCEDURE — 83036 HEMOGLOBIN GLYCOSYLATED A1C: CPT | Performed by: ORTHOPAEDIC SURGERY

## 2017-02-10 PROCEDURE — 87086 URINE CULTURE/COLONY COUNT: CPT | Performed by: ORTHOPAEDIC SURGERY

## 2017-02-10 RX ORDER — FLUTICASONE PROPIONATE 50 MCG
2 SPRAY, SUSPENSION (ML) NASAL EVERY MORNING
COMMUNITY

## 2017-02-10 RX ORDER — ACETAMINOPHEN 500 MG
500 TABLET ORAL EVERY 6 HOURS PRN
COMMUNITY
End: 2017-02-23 | Stop reason: HOSPADM

## 2017-02-10 RX ORDER — FEXOFENADINE HCL 180 MG/1
180 TABLET ORAL NIGHTLY
COMMUNITY
End: 2018-03-13

## 2017-02-10 NOTE — DISCHARGE INSTRUCTIONS
SURGERY 2-20-17  ARRIVAL TIME 10:00    Take the following medications the morning of surgery with a small sip of water.  NEFIDIPINE      General Instructions:  • Do not eat or drink after midnight: includes water, mints, or gum. You may brush your teeth.  • Do not smoke, chew tobacco, or drink alcohol.  • Bring medications in original bottles, any inhalers and if applicable your C-PAP/ BI-PAP machine.  • Bring any papers given to you in the doctor’s office.  • Wear clean comfortable clothes and socks.  • Do not wear contact lenses or make-up.  Bring a case for your glasses if applicable.   • Bring crutches or walker if applicable.  • Leave all other valuables and jewelry at home.    If you were given a blood bank ID arm band remember to bring it with you the day of surgery.    Preventing a Surgical Site Infection:  Shower on the morning of surgery using a fresh bar of anti-bacterial soap (such as Dial) and clean washcloth.  Dry with a clean towel and dress in clean clothing.  For 2 to 3 days before surgery, avoid shaving with a razor near where you will have surgery because the razor can irritate skin and make it easier to develop an infection  Ask your surgeon if you will be receiving antibiotics prior to surgery  Make sure you, your family, and all healthcare providers clean their hands with soap and water or an alcohol based hand  before caring for you or your wound  If at all possible, quit smoking as many days before surgery as you can.    Day of surgery:  Upon arrival, a Pre-op nurse and Anesthesiologist will review your health history, obtain vital signs, and answer questions you may have.  The only belongings needed at this time will be your home medications and if applicable your C-PAP/BI-PAP machine.  If you are staying overnight your family can leave the rest of your belongings in the car and bring them to your room later.  A Pre-op nurse will start an IV and you may receive medication in  preparation for surgery, including something to help you relax.  Your family will be able to see you in the Pre-op area.  While you are in surgery your family should notify the waiting room  if they leave the waiting room area and provide a contact phone number.    Please be aware that surgery does come with discomfort.  We want to make every effort to control your discomfort so please discuss any uncontrolled symptoms with your nurse.   Your doctor will most likely have prescribed pain medications.      If you are going home after surgery you will receive individualized written care instructions before being discharged.  A responsible adult must drive you to and from the hospital on the day of your surgery and stay with you for 24 hours.    If you are staying overnight following surgery, you will be transported to your hospital room following the recovery period.  [facility] has all private rooms.    If you have any questions please call Pre-Admission Testing at [phone].  Deductibles and co-payments are collected on the day of service. Please be prepared to pay the required co-pay, deductible or deposit on the day of service as defined by your plan.USE AS DIRECTED    2% CHLORAHEXIDINE GLUCONATE* CLOTH  Preparing or “prepping” skin before surgery can reduce the risk of infection at the surgical site. To make the process easier, [facility] has chosen disposable cloths moistened with a rinse-free, 2% Chlorhexidine Gluconate (CHG) antiseptic solution. The steps below outline the prepping process and should be carefully followed.        Use the prep cloth on the area that is circled in the diagram             Directions Night before Surgery  1) Shower using a fresh bar of anti-bacterial soap (such as Dial) and clean washcloth.  Use a clean towel to completely dry your skin.  2) Do not use any lotions, oils or creams on your skin.  3) Open the package and remove 1 cloth, wipe  your skin for 30 seconds in a circular motion.  Allow to dry for 3 minutes.  4) Repeat #3 with second cloth.  5) Do not touch your eyes, ears, or mouth with the prep cloth.  6) Allow the wet prep solution to air dry.  7) Discard the prep cloth and wash your hands with soap and water.   8) Dress in clean bed clothes and sleep on fresh clean bed sheets.   9) You may experience some temporary itching after the prep.    Directions Day of Surgery  1) Repeat steps 1,2,3,4,5,6,7, and 9.   2) Dress in clean clothes before coming to the hospital.    BACTROBAN NASAL OINTMENT  There are many germs normally in your nose. Bactroban is an ointment that will help reduce these germs. Please follow these instructions for Bactroban use:    ____Two days before surgery in the evening Date________    ____The day before surgery in the morning  Date________    ____The day before surgery in the evening              Date________    ____The day of surgery in the morning    Date________    **Squirt ½ package of Bactroban Ointment onto a cotton applicator and apply to inside of 1st nostril.  Squirt the remaining Bactroban and apply to the inside of the other nostril.

## 2017-02-12 LAB — BACTERIA SPEC AEROBE CULT: NORMAL

## 2017-02-18 NOTE — H&P
No changes in History and physical since last visit 1 week ago.      Impression: Right knee osteoarthritis    Plan:  Right TKA    Lamont Morales M.D.

## 2017-02-20 ENCOUNTER — ANESTHESIA (OUTPATIENT)
Dept: PERIOP | Facility: HOSPITAL | Age: 57
End: 2017-02-20

## 2017-02-20 ENCOUNTER — ANESTHESIA EVENT (OUTPATIENT)
Dept: PERIOP | Facility: HOSPITAL | Age: 57
End: 2017-02-20

## 2017-02-20 ENCOUNTER — APPOINTMENT (OUTPATIENT)
Dept: GENERAL RADIOLOGY | Facility: HOSPITAL | Age: 57
End: 2017-02-20

## 2017-02-20 ENCOUNTER — HOSPITAL ENCOUNTER (INPATIENT)
Facility: HOSPITAL | Age: 57
LOS: 3 days | Discharge: HOME HEALTH | End: 2017-02-23
Attending: ORTHOPAEDIC SURGERY | Admitting: ORTHOPAEDIC SURGERY

## 2017-02-20 DIAGNOSIS — M17.11 PRIMARY OSTEOARTHRITIS OF RIGHT KNEE: Primary | ICD-10-CM

## 2017-02-20 LAB
B-HCG UR QL: NEGATIVE
GLUCOSE BLDC GLUCOMTR-MCNC: 150 MG/DL (ref 70–130)
GLUCOSE BLDC GLUCOMTR-MCNC: 201 MG/DL (ref 70–130)
HCT VFR BLD AUTO: 42.1 % (ref 35.6–45.5)
HGB BLD-MCNC: 15.1 G/DL (ref 11.9–15.5)
INTERNAL NEGATIVE CONTROL: NEGATIVE
INTERNAL POSITIVE CONTROL: POSITIVE
Lab: NORMAL

## 2017-02-20 PROCEDURE — 25010000002 HYDROMORPHONE PER 4 MG: Performed by: NURSE ANESTHETIST, CERTIFIED REGISTERED

## 2017-02-20 PROCEDURE — 25810000003 POTASSIUM CHLORIDE PER 2 MEQ: Performed by: ORTHOPAEDIC SURGERY

## 2017-02-20 PROCEDURE — 25010000002 HYDRALAZINE PER 20 MG: Performed by: NURSE ANESTHETIST, CERTIFIED REGISTERED

## 2017-02-20 PROCEDURE — 85014 HEMATOCRIT: CPT | Performed by: ORTHOPAEDIC SURGERY

## 2017-02-20 PROCEDURE — 73560 X-RAY EXAM OF KNEE 1 OR 2: CPT

## 2017-02-20 PROCEDURE — 0SRC0J9 REPLACEMENT OF RIGHT KNEE JOINT WITH SYNTHETIC SUBSTITUTE, CEMENTED, OPEN APPROACH: ICD-10-PCS | Performed by: ORTHOPAEDIC SURGERY

## 2017-02-20 PROCEDURE — 25010000002 MIDAZOLAM PER 1 MG: Performed by: ANESTHESIOLOGY

## 2017-02-20 PROCEDURE — 25010000002 SUCCINYLCHOLINE PER 20 MG: Performed by: NURSE ANESTHETIST, CERTIFIED REGISTERED

## 2017-02-20 PROCEDURE — 25010000002 HYDROMORPHONE PER 4 MG: Performed by: ORTHOPAEDIC SURGERY

## 2017-02-20 PROCEDURE — C1713 ANCHOR/SCREW BN/BN,TIS/BN: HCPCS | Performed by: ORTHOPAEDIC SURGERY

## 2017-02-20 PROCEDURE — 82962 GLUCOSE BLOOD TEST: CPT

## 2017-02-20 PROCEDURE — 25010000002 PROPOFOL 10 MG/ML EMULSION: Performed by: NURSE ANESTHETIST, CERTIFIED REGISTERED

## 2017-02-20 PROCEDURE — 25010000002 FENTANYL CITRATE (PF) 100 MCG/2ML SOLUTION: Performed by: ANESTHESIOLOGY

## 2017-02-20 PROCEDURE — C1776 JOINT DEVICE (IMPLANTABLE): HCPCS | Performed by: ORTHOPAEDIC SURGERY

## 2017-02-20 PROCEDURE — 25010000002 ONDANSETRON PER 1 MG: Performed by: NURSE ANESTHETIST, CERTIFIED REGISTERED

## 2017-02-20 PROCEDURE — 25010000002 VANCOMYCIN PER 500 MG

## 2017-02-20 PROCEDURE — 25010000002 FENTANYL CITRATE (PF) 100 MCG/2ML SOLUTION: Performed by: NURSE ANESTHETIST, CERTIFIED REGISTERED

## 2017-02-20 PROCEDURE — 25010000002 NEOSTIGMINE 10 MG/10ML SOLUTION: Performed by: NURSE ANESTHETIST, CERTIFIED REGISTERED

## 2017-02-20 PROCEDURE — 25010000002 MEPERIDINE 25 MG/0.5ML SOLUTION: Performed by: NURSE ANESTHETIST, CERTIFIED REGISTERED

## 2017-02-20 PROCEDURE — 85018 HEMOGLOBIN: CPT | Performed by: ORTHOPAEDIC SURGERY

## 2017-02-20 DEVICE — PAT KN PERSONA VE CRS/LNK CMT 8.5X32MM: Type: IMPLANTABLE DEVICE | Site: KNEE | Status: FUNCTIONAL

## 2017-02-20 DEVICE — EXT STEM FEM/KN PERSONA TPR 14XPLS30MM: Type: IMPLANTABLE DEVICE | Site: KNEE | Status: FUNCTIONAL

## 2017-02-20 DEVICE — COMP FEM/KN PERSONA CR CMT COCR STD SZ8 RT: Type: IMPLANTABLE DEVICE | Site: KNEE | Status: FUNCTIONAL

## 2017-02-20 DEVICE — CMT BONE PALACOS 120001: Type: IMPLANTABLE DEVICE | Site: KNEE | Status: FUNCTIONAL

## 2017-02-20 DEVICE — STEM TIB/KN PERSONA CMT 5D SZE RT: Type: IMPLANTABLE DEVICE | Site: KNEE | Status: FUNCTIONAL

## 2017-02-20 DEVICE — TOTL KN FM CEM VE ZIMMER: Type: IMPLANTABLE DEVICE | Site: KNEE | Status: FUNCTIONAL

## 2017-02-20 RX ORDER — VANCOMYCIN HYDROCHLORIDE 1 G/200ML
INJECTION, SOLUTION INTRAVENOUS
Status: COMPLETED
Start: 2017-02-20 | End: 2017-02-20

## 2017-02-20 RX ORDER — ONDANSETRON 2 MG/ML
4 INJECTION INTRAMUSCULAR; INTRAVENOUS ONCE AS NEEDED
Status: DISCONTINUED | OUTPATIENT
Start: 2017-02-20 | End: 2017-02-20 | Stop reason: HOSPADM

## 2017-02-20 RX ORDER — MULTIPLE VITAMINS W/ MINERALS TAB 9MG-400MCG
1 TAB ORAL DAILY
Status: DISCONTINUED | OUTPATIENT
Start: 2017-02-20 | End: 2017-02-23 | Stop reason: HOSPADM

## 2017-02-20 RX ORDER — PROMETHAZINE HYDROCHLORIDE 25 MG/ML
12.5 INJECTION, SOLUTION INTRAMUSCULAR; INTRAVENOUS EVERY 6 HOURS PRN
Status: DISCONTINUED | OUTPATIENT
Start: 2017-02-20 | End: 2017-02-23 | Stop reason: HOSPADM

## 2017-02-20 RX ORDER — BLOOD-GLUCOSE METER
EACH MISCELLANEOUS
COMMUNITY
Start: 2015-08-14

## 2017-02-20 RX ORDER — HYDROMORPHONE HYDROCHLORIDE 1 MG/ML
0.25 INJECTION, SOLUTION INTRAMUSCULAR; INTRAVENOUS; SUBCUTANEOUS
Status: DISCONTINUED | OUTPATIENT
Start: 2017-02-20 | End: 2017-02-20 | Stop reason: HOSPADM

## 2017-02-20 RX ORDER — HYDROMORPHONE HYDROCHLORIDE 1 MG/ML
0.5 INJECTION, SOLUTION INTRAMUSCULAR; INTRAVENOUS; SUBCUTANEOUS
Status: COMPLETED | OUTPATIENT
Start: 2017-02-20 | End: 2017-02-20

## 2017-02-20 RX ORDER — LIDOCAINE HYDROCHLORIDE 20 MG/ML
INJECTION, SOLUTION INFILTRATION; PERINEURAL AS NEEDED
Status: DISCONTINUED | OUTPATIENT
Start: 2017-02-20 | End: 2017-02-20 | Stop reason: SURG

## 2017-02-20 RX ORDER — NALOXONE HCL 0.4 MG/ML
0.1 VIAL (ML) INJECTION
Status: DISCONTINUED | OUTPATIENT
Start: 2017-02-20 | End: 2017-02-23 | Stop reason: HOSPADM

## 2017-02-20 RX ORDER — NIFEDIPINE 60 MG/1
60 TABLET, EXTENDED RELEASE ORAL 2 TIMES DAILY
Status: DISCONTINUED | OUTPATIENT
Start: 2017-02-20 | End: 2017-02-23 | Stop reason: HOSPADM

## 2017-02-20 RX ORDER — SPIRONOLACTONE 25 MG/1
25 TABLET ORAL NIGHTLY
Status: DISCONTINUED | OUTPATIENT
Start: 2017-02-20 | End: 2017-02-23 | Stop reason: HOSPADM

## 2017-02-20 RX ORDER — PROMETHAZINE HYDROCHLORIDE 25 MG/1
12.5 TABLET ORAL ONCE AS NEEDED
Status: DISCONTINUED | OUTPATIENT
Start: 2017-02-20 | End: 2017-02-20 | Stop reason: HOSPADM

## 2017-02-20 RX ORDER — SENNA AND DOCUSATE SODIUM 50; 8.6 MG/1; MG/1
2 TABLET, FILM COATED ORAL 2 TIMES DAILY
Status: DISCONTINUED | OUTPATIENT
Start: 2017-02-20 | End: 2017-02-23 | Stop reason: HOSPADM

## 2017-02-20 RX ORDER — ACETAMINOPHEN 325 MG/1
325 TABLET ORAL EVERY 4 HOURS PRN
Status: DISCONTINUED | OUTPATIENT
Start: 2017-02-20 | End: 2017-02-23 | Stop reason: HOSPADM

## 2017-02-20 RX ORDER — ONDANSETRON 2 MG/ML
4 INJECTION INTRAMUSCULAR; INTRAVENOUS EVERY 6 HOURS PRN
Status: DISCONTINUED | OUTPATIENT
Start: 2017-02-20 | End: 2017-02-23 | Stop reason: HOSPADM

## 2017-02-20 RX ORDER — BUPIVACAINE HCL/0.9 % NACL/PF 0.1 %
3 PLASTIC BAG, INJECTION (ML) EPIDURAL ONCE
Status: COMPLETED | OUTPATIENT
Start: 2017-02-20 | End: 2017-02-20

## 2017-02-20 RX ORDER — PROMETHAZINE HYDROCHLORIDE 25 MG/ML
12.5 INJECTION, SOLUTION INTRAMUSCULAR; INTRAVENOUS ONCE AS NEEDED
Status: DISCONTINUED | OUTPATIENT
Start: 2017-02-20 | End: 2017-02-20 | Stop reason: HOSPADM

## 2017-02-20 RX ORDER — FENTANYL CITRATE 50 UG/ML
50 INJECTION, SOLUTION INTRAMUSCULAR; INTRAVENOUS
Status: COMPLETED | OUTPATIENT
Start: 2017-02-20 | End: 2017-02-20

## 2017-02-20 RX ORDER — HYDROMORPHONE HCL 110MG/55ML
PATIENT CONTROLLED ANALGESIA SYRINGE INTRAVENOUS AS NEEDED
Status: DISCONTINUED | OUTPATIENT
Start: 2017-02-20 | End: 2017-02-20 | Stop reason: SURG

## 2017-02-20 RX ORDER — FAMOTIDINE 10 MG/ML
20 INJECTION, SOLUTION INTRAVENOUS ONCE
Status: COMPLETED | OUTPATIENT
Start: 2017-02-20 | End: 2017-02-20

## 2017-02-20 RX ORDER — FENTANYL CITRATE 50 UG/ML
50 INJECTION, SOLUTION INTRAMUSCULAR; INTRAVENOUS
Status: DISCONTINUED | OUTPATIENT
Start: 2017-02-20 | End: 2017-02-20 | Stop reason: HOSPADM

## 2017-02-20 RX ORDER — TRANEXAMIC ACID 100 MG/ML
INJECTION, SOLUTION INTRAVENOUS AS NEEDED
Status: DISCONTINUED | OUTPATIENT
Start: 2017-02-20 | End: 2017-02-20 | Stop reason: SURG

## 2017-02-20 RX ORDER — FLUMAZENIL 0.1 MG/ML
0.2 INJECTION INTRAVENOUS AS NEEDED
Status: DISCONTINUED | OUTPATIENT
Start: 2017-02-20 | End: 2017-02-20 | Stop reason: HOSPADM

## 2017-02-20 RX ORDER — SPIRONOLACTONE 50 MG/1
50 TABLET, FILM COATED ORAL EVERY MORNING
Status: DISCONTINUED | OUTPATIENT
Start: 2017-02-21 | End: 2017-02-23 | Stop reason: HOSPADM

## 2017-02-20 RX ORDER — LIDOCAINE HYDROCHLORIDE 40 MG/ML
SOLUTION TOPICAL AS NEEDED
Status: DISCONTINUED | OUTPATIENT
Start: 2017-02-20 | End: 2017-02-20 | Stop reason: SURG

## 2017-02-20 RX ORDER — DIPHENHYDRAMINE HYDROCHLORIDE 50 MG/ML
12.5 INJECTION INTRAMUSCULAR; INTRAVENOUS
Status: DISCONTINUED | OUTPATIENT
Start: 2017-02-20 | End: 2017-02-20 | Stop reason: HOSPADM

## 2017-02-20 RX ORDER — HYDROCODONE BITARTRATE AND ACETAMINOPHEN 7.5; 325 MG/1; MG/1
1 TABLET ORAL ONCE AS NEEDED
Status: DISCONTINUED | OUTPATIENT
Start: 2017-02-20 | End: 2017-02-20 | Stop reason: HOSPADM

## 2017-02-20 RX ORDER — SODIUM CHLORIDE AND POTASSIUM CHLORIDE 150; 450 MG/100ML; MG/100ML
100 INJECTION, SOLUTION INTRAVENOUS CONTINUOUS
Status: DISCONTINUED | OUTPATIENT
Start: 2017-02-20 | End: 2017-02-23 | Stop reason: HOSPADM

## 2017-02-20 RX ORDER — PROMETHAZINE HYDROCHLORIDE 25 MG/1
25 SUPPOSITORY RECTAL ONCE AS NEEDED
Status: DISCONTINUED | OUTPATIENT
Start: 2017-02-20 | End: 2017-02-20 | Stop reason: HOSPADM

## 2017-02-20 RX ORDER — FLUTICASONE PROPIONATE 50 MCG
2 SPRAY, SUSPENSION (ML) NASAL EVERY MORNING
Status: DISCONTINUED | OUTPATIENT
Start: 2017-02-21 | End: 2017-02-23 | Stop reason: HOSPADM

## 2017-02-20 RX ORDER — FUROSEMIDE 20 MG/1
20 TABLET ORAL AS NEEDED
Status: DISCONTINUED | OUTPATIENT
Start: 2017-02-20 | End: 2017-02-23 | Stop reason: HOSPADM

## 2017-02-20 RX ORDER — DIAZEPAM 5 MG/1
5 TABLET ORAL EVERY 6 HOURS PRN
Status: DISCONTINUED | OUTPATIENT
Start: 2017-02-20 | End: 2017-02-23 | Stop reason: HOSPADM

## 2017-02-20 RX ORDER — BUPIVACAINE HCL/0.9 % NACL/PF 0.1 %
3 PLASTIC BAG, INJECTION (ML) EPIDURAL EVERY 8 HOURS
Status: COMPLETED | OUTPATIENT
Start: 2017-02-20 | End: 2017-02-21

## 2017-02-20 RX ORDER — OXYCODONE AND ACETAMINOPHEN 7.5; 325 MG/1; MG/1
1 TABLET ORAL ONCE AS NEEDED
Status: DISCONTINUED | OUTPATIENT
Start: 2017-02-20 | End: 2017-02-20 | Stop reason: HOSPADM

## 2017-02-20 RX ORDER — HYDROMORPHONE HYDROCHLORIDE 1 MG/ML
0.5 INJECTION, SOLUTION INTRAMUSCULAR; INTRAVENOUS; SUBCUTANEOUS
Status: DISCONTINUED | OUTPATIENT
Start: 2017-02-20 | End: 2017-02-23 | Stop reason: HOSPADM

## 2017-02-20 RX ORDER — OXYCODONE AND ACETAMINOPHEN 10; 325 MG/1; MG/1
1 TABLET ORAL
Status: DISCONTINUED | OUTPATIENT
Start: 2017-02-20 | End: 2017-02-23 | Stop reason: HOSPADM

## 2017-02-20 RX ORDER — MAGNESIUM HYDROXIDE 1200 MG/15ML
LIQUID ORAL AS NEEDED
Status: DISCONTINUED | OUTPATIENT
Start: 2017-02-20 | End: 2017-02-20 | Stop reason: HOSPADM

## 2017-02-20 RX ORDER — OXYCODONE AND ACETAMINOPHEN 10; 325 MG/1; MG/1
2 TABLET ORAL EVERY 4 HOURS PRN
Status: DISCONTINUED | OUTPATIENT
Start: 2017-02-20 | End: 2017-02-23 | Stop reason: HOSPADM

## 2017-02-20 RX ORDER — PROPOFOL 10 MG/ML
VIAL (ML) INTRAVENOUS AS NEEDED
Status: DISCONTINUED | OUTPATIENT
Start: 2017-02-20 | End: 2017-02-20 | Stop reason: SURG

## 2017-02-20 RX ORDER — BUPIVACAINE HCL/0.9 % NACL/PF 0.1 %
3 PLASTIC BAG, INJECTION (ML) EPIDURAL ONCE
Status: DISCONTINUED | OUTPATIENT
Start: 2017-02-20 | End: 2017-02-20 | Stop reason: HOSPADM

## 2017-02-20 RX ORDER — LABETALOL HYDROCHLORIDE 5 MG/ML
5 INJECTION, SOLUTION INTRAVENOUS
Status: DISCONTINUED | OUTPATIENT
Start: 2017-02-20 | End: 2017-02-20 | Stop reason: HOSPADM

## 2017-02-20 RX ORDER — ONDANSETRON 4 MG/1
4 TABLET, FILM COATED ORAL EVERY 6 HOURS PRN
Status: DISCONTINUED | OUTPATIENT
Start: 2017-02-20 | End: 2017-02-23 | Stop reason: HOSPADM

## 2017-02-20 RX ORDER — MIDAZOLAM HYDROCHLORIDE 1 MG/ML
2 INJECTION INTRAMUSCULAR; INTRAVENOUS
Status: DISCONTINUED | OUTPATIENT
Start: 2017-02-20 | End: 2017-02-20 | Stop reason: HOSPADM

## 2017-02-20 RX ORDER — ACETAMINOPHEN 500 MG
500 TABLET ORAL EVERY 6 HOURS PRN
Status: DISCONTINUED | OUTPATIENT
Start: 2017-02-20 | End: 2017-02-23 | Stop reason: HOSPADM

## 2017-02-20 RX ORDER — DIPHENHYDRAMINE HCL 25 MG
25 CAPSULE ORAL EVERY 4 HOURS PRN
Status: DISCONTINUED | OUTPATIENT
Start: 2017-02-20 | End: 2017-02-23 | Stop reason: HOSPADM

## 2017-02-20 RX ORDER — HYDRALAZINE HYDROCHLORIDE 20 MG/ML
5 INJECTION INTRAMUSCULAR; INTRAVENOUS
Status: DISCONTINUED | OUTPATIENT
Start: 2017-02-20 | End: 2017-02-20 | Stop reason: HOSPADM

## 2017-02-20 RX ORDER — GLYCOPYRROLATE 0.2 MG/ML
INJECTION INTRAMUSCULAR; INTRAVENOUS AS NEEDED
Status: DISCONTINUED | OUTPATIENT
Start: 2017-02-20 | End: 2017-02-20 | Stop reason: SURG

## 2017-02-20 RX ORDER — ONDANSETRON 4 MG/1
4 TABLET, ORALLY DISINTEGRATING ORAL EVERY 6 HOURS PRN
Status: DISCONTINUED | OUTPATIENT
Start: 2017-02-20 | End: 2017-02-23 | Stop reason: HOSPADM

## 2017-02-20 RX ORDER — NALOXONE HCL 0.4 MG/ML
0.2 VIAL (ML) INJECTION AS NEEDED
Status: DISCONTINUED | OUTPATIENT
Start: 2017-02-20 | End: 2017-02-20 | Stop reason: HOSPADM

## 2017-02-20 RX ORDER — MIDAZOLAM HYDROCHLORIDE 1 MG/ML
1 INJECTION INTRAMUSCULAR; INTRAVENOUS
Status: DISCONTINUED | OUTPATIENT
Start: 2017-02-20 | End: 2017-02-20 | Stop reason: HOSPADM

## 2017-02-20 RX ORDER — ROCURONIUM BROMIDE 10 MG/ML
INJECTION, SOLUTION INTRAVENOUS AS NEEDED
Status: DISCONTINUED | OUTPATIENT
Start: 2017-02-20 | End: 2017-02-20 | Stop reason: SURG

## 2017-02-20 RX ORDER — SODIUM CHLORIDE, SODIUM LACTATE, POTASSIUM CHLORIDE, CALCIUM CHLORIDE 600; 310; 30; 20 MG/100ML; MG/100ML; MG/100ML; MG/100ML
9 INJECTION, SOLUTION INTRAVENOUS CONTINUOUS
Status: DISCONTINUED | OUTPATIENT
Start: 2017-02-20 | End: 2017-02-20 | Stop reason: HOSPADM

## 2017-02-20 RX ORDER — NEOSTIGMINE METHYLSULFATE 1 MG/ML
INJECTION, SOLUTION INTRAVENOUS AS NEEDED
Status: DISCONTINUED | OUTPATIENT
Start: 2017-02-20 | End: 2017-02-20 | Stop reason: SURG

## 2017-02-20 RX ORDER — BISACODYL 10 MG
10 SUPPOSITORY, RECTAL RECTAL DAILY PRN
Status: DISCONTINUED | OUTPATIENT
Start: 2017-02-20 | End: 2017-02-23 | Stop reason: HOSPADM

## 2017-02-20 RX ORDER — PROMETHAZINE HYDROCHLORIDE 25 MG/1
25 TABLET ORAL ONCE AS NEEDED
Status: DISCONTINUED | OUTPATIENT
Start: 2017-02-20 | End: 2017-02-20 | Stop reason: HOSPADM

## 2017-02-20 RX ORDER — SODIUM CHLORIDE 0.9 % (FLUSH) 0.9 %
1-10 SYRINGE (ML) INJECTION AS NEEDED
Status: DISCONTINUED | OUTPATIENT
Start: 2017-02-20 | End: 2017-02-20 | Stop reason: HOSPADM

## 2017-02-20 RX ORDER — ONDANSETRON 2 MG/ML
INJECTION INTRAMUSCULAR; INTRAVENOUS AS NEEDED
Status: DISCONTINUED | OUTPATIENT
Start: 2017-02-20 | End: 2017-02-20 | Stop reason: SURG

## 2017-02-20 RX ORDER — CETIRIZINE HYDROCHLORIDE 10 MG/1
10 TABLET ORAL DAILY
Status: DISCONTINUED | OUTPATIENT
Start: 2017-02-20 | End: 2017-02-23 | Stop reason: HOSPADM

## 2017-02-20 RX ORDER — IBUPROFEN 800 MG/1
800 TABLET ORAL EVERY 8 HOURS
COMMUNITY
Start: 2014-10-08 | End: 2018-05-03

## 2017-02-20 RX ORDER — LANCETS 33 GAUGE
EACH MISCELLANEOUS
COMMUNITY
Start: 2015-08-14 | End: 2021-07-19 | Stop reason: SDUPTHER

## 2017-02-20 RX ORDER — ACETAMINOPHEN 325 MG/1
650 TABLET ORAL EVERY 4 HOURS PRN
Status: DISCONTINUED | OUTPATIENT
Start: 2017-02-20 | End: 2017-02-23 | Stop reason: HOSPADM

## 2017-02-20 RX ORDER — SUCCINYLCHOLINE CHLORIDE 20 MG/ML
INJECTION INTRAMUSCULAR; INTRAVENOUS AS NEEDED
Status: DISCONTINUED | OUTPATIENT
Start: 2017-02-20 | End: 2017-02-20 | Stop reason: SURG

## 2017-02-20 RX ADMIN — OXYCODONE HYDROCHLORIDE AND ACETAMINOPHEN 2 TABLET: 10; 325 TABLET ORAL at 21:31

## 2017-02-20 RX ADMIN — SUCCINYLCHOLINE CHLORIDE 60 MG: 20 INJECTION, SOLUTION INTRAMUSCULAR; INTRAVENOUS; PARENTERAL at 12:45

## 2017-02-20 RX ADMIN — NIFEDIPINE 60 MG: 60 TABLET, FILM COATED, EXTENDED RELEASE ORAL at 18:25

## 2017-02-20 RX ADMIN — METFORMIN HYDROCHLORIDE 500 MG: 500 TABLET ORAL at 18:25

## 2017-02-20 RX ADMIN — SODIUM CHLORIDE, POTASSIUM CHLORIDE, SODIUM LACTATE AND CALCIUM CHLORIDE 9 ML/HR: 600; 310; 30; 20 INJECTION, SOLUTION INTRAVENOUS at 14:43

## 2017-02-20 RX ADMIN — HYDROMORPHONE HYDROCHLORIDE 0.5 MG: 1 INJECTION, SOLUTION INTRAMUSCULAR; INTRAVENOUS; SUBCUTANEOUS at 15:08

## 2017-02-20 RX ADMIN — FENTANYL CITRATE 50 MCG: 50 INJECTION INTRAMUSCULAR; INTRAVENOUS at 14:42

## 2017-02-20 RX ADMIN — ONDANSETRON 4 MG: 2 INJECTION INTRAMUSCULAR; INTRAVENOUS at 13:50

## 2017-02-20 RX ADMIN — CEFAZOLIN 3 G: 1 INJECTION, POWDER, FOR SOLUTION INTRAMUSCULAR; INTRAVENOUS; PARENTERAL at 12:51

## 2017-02-20 RX ADMIN — OXYCODONE HYDROCHLORIDE AND ACETAMINOPHEN 2 TABLET: 10; 325 TABLET ORAL at 17:28

## 2017-02-20 RX ADMIN — FENTANYL CITRATE 50 MCG: 50 INJECTION INTRAMUSCULAR; INTRAVENOUS at 13:00

## 2017-02-20 RX ADMIN — CEFAZOLIN 3 G: 1 INJECTION, POWDER, FOR SOLUTION INTRAMUSCULAR; INTRAVENOUS; PARENTERAL at 21:32

## 2017-02-20 RX ADMIN — NEOSTIGMINE METHYLSULFATE 3 MG: 1 INJECTION INTRAVENOUS at 13:49

## 2017-02-20 RX ADMIN — SODIUM CHLORIDE, POTASSIUM CHLORIDE, SODIUM LACTATE AND CALCIUM CHLORIDE 9 ML/HR: 600; 310; 30; 20 INJECTION, SOLUTION INTRAVENOUS at 11:26

## 2017-02-20 RX ADMIN — MULTIPLE VITAMINS W/ MINERALS TAB 1 TABLET: TAB at 18:25

## 2017-02-20 RX ADMIN — MIDAZOLAM 1 MG: 1 INJECTION INTRAMUSCULAR; INTRAVENOUS at 11:26

## 2017-02-20 RX ADMIN — FENTANYL CITRATE 50 MCG: 50 INJECTION INTRAMUSCULAR; INTRAVENOUS at 15:23

## 2017-02-20 RX ADMIN — FENTANYL CITRATE 50 MCG: 50 INJECTION INTRAMUSCULAR; INTRAVENOUS at 12:50

## 2017-02-20 RX ADMIN — LABETALOL HYDROCHLORIDE 5 MG: 5 INJECTION, SOLUTION INTRAVENOUS at 16:01

## 2017-02-20 RX ADMIN — SPIRONOLACTONE 25 MG: 25 TABLET, FILM COATED ORAL at 21:32

## 2017-02-20 RX ADMIN — FENTANYL CITRATE 50 MCG: 50 INJECTION INTRAMUSCULAR; INTRAVENOUS at 13:04

## 2017-02-20 RX ADMIN — HYDRALAZINE HYDROCHLORIDE 5 MG: 20 INJECTION INTRAMUSCULAR; INTRAVENOUS at 16:01

## 2017-02-20 RX ADMIN — HYDROMORPHONE HYDROCHLORIDE 0.5 MG: 2 INJECTION, SOLUTION INTRAMUSCULAR; INTRAVENOUS; SUBCUTANEOUS at 13:57

## 2017-02-20 RX ADMIN — GLYCOPYRROLATE 0.4 MG: 0.2 INJECTION INTRAMUSCULAR; INTRAVENOUS at 13:49

## 2017-02-20 RX ADMIN — MEPERIDINE HYDROCHLORIDE 12.5 MG: 25 INJECTION, SOLUTION INTRAMUSCULAR; INTRAVENOUS; SUBCUTANEOUS at 14:45

## 2017-02-20 RX ADMIN — POTASSIUM CHLORIDE AND SODIUM CHLORIDE 100 ML/HR: 450; 150 INJECTION, SOLUTION INTRAVENOUS at 18:48

## 2017-02-20 RX ADMIN — SODIUM CHLORIDE, POTASSIUM CHLORIDE, SODIUM LACTATE AND CALCIUM CHLORIDE: 600; 310; 30; 20 INJECTION, SOLUTION INTRAVENOUS at 12:38

## 2017-02-20 RX ADMIN — PROPOFOL 200 MG: 10 INJECTION, EMULSION INTRAVENOUS at 12:45

## 2017-02-20 RX ADMIN — HYDROMORPHONE HYDROCHLORIDE 0.5 MG: 1 INJECTION, SOLUTION INTRAMUSCULAR; INTRAVENOUS; SUBCUTANEOUS at 16:18

## 2017-02-20 RX ADMIN — TRANEXAMIC ACID 1000 MG: 100 INJECTION, SOLUTION INTRAVENOUS at 12:55

## 2017-02-20 RX ADMIN — HYDROMORPHONE HYDROCHLORIDE 1 MG: 2 INJECTION, SOLUTION INTRAMUSCULAR; INTRAVENOUS; SUBCUTANEOUS at 13:17

## 2017-02-20 RX ADMIN — LIDOCAINE HYDROCHLORIDE 80 MG: 20 INJECTION, SOLUTION INFILTRATION; PERINEURAL at 12:45

## 2017-02-20 RX ADMIN — LIDOCAINE HYDROCHLORIDE 1 EACH: 40 SPRAY LARYNGEAL; TRANSTRACHEAL at 12:47

## 2017-02-20 RX ADMIN — FENTANYL CITRATE 50 MCG: 50 INJECTION INTRAMUSCULAR; INTRAVENOUS at 12:55

## 2017-02-20 RX ADMIN — MIDAZOLAM HYDROCHLORIDE 2 MG: 1 INJECTION, SOLUTION INTRAMUSCULAR; INTRAVENOUS at 11:07

## 2017-02-20 RX ADMIN — MEPERIDINE HYDROCHLORIDE 12.5 MG: 25 INJECTION, SOLUTION INTRAMUSCULAR; INTRAVENOUS; SUBCUTANEOUS at 14:50

## 2017-02-20 RX ADMIN — FENTANYL CITRATE 50 MCG: 50 INJECTION INTRAMUSCULAR; INTRAVENOUS at 15:02

## 2017-02-20 RX ADMIN — HYDROMORPHONE HYDROCHLORIDE 0.5 MG: 1 INJECTION, SOLUTION INTRAMUSCULAR; INTRAVENOUS; SUBCUTANEOUS at 14:57

## 2017-02-20 RX ADMIN — LABETALOL HYDROCHLORIDE 5 MG: 5 INJECTION, SOLUTION INTRAVENOUS at 15:20

## 2017-02-20 RX ADMIN — HYDROMORPHONE HYDROCHLORIDE 0.5 MG: 1 INJECTION, SOLUTION INTRAMUSCULAR; INTRAVENOUS; SUBCUTANEOUS at 15:32

## 2017-02-20 RX ADMIN — HYDROMORPHONE HYDROCHLORIDE 0.5 MG: 1 INJECTION, SOLUTION INTRAMUSCULAR; INTRAVENOUS; SUBCUTANEOUS at 23:44

## 2017-02-20 RX ADMIN — FAMOTIDINE 20 MG: 10 INJECTION, SOLUTION INTRAVENOUS at 11:26

## 2017-02-20 RX ADMIN — MIDAZOLAM 1 MG: 1 INJECTION INTRAMUSCULAR; INTRAVENOUS at 11:46

## 2017-02-20 RX ADMIN — VANCOMYCIN HYDROCHLORIDE 1000 MG: 1 INJECTION, SOLUTION INTRAVENOUS at 11:26

## 2017-02-20 RX ADMIN — ROCURONIUM BROMIDE 40 MG: 10 INJECTION INTRAVENOUS at 12:52

## 2017-02-20 RX ADMIN — FENTANYL CITRATE 50 MCG: 50 INJECTION INTRAMUSCULAR; INTRAVENOUS at 16:13

## 2017-02-20 RX ADMIN — FENTANYL CITRATE 50 MCG: 50 INJECTION INTRAMUSCULAR; INTRAVENOUS at 12:44

## 2017-02-20 RX ADMIN — VANCOMYCIN HYDROCHLORIDE 1000 MG: 1 INJECTION, SOLUTION INTRAVENOUS at 12:33

## 2017-02-20 RX ADMIN — DOCUSATE SODIUM -SENNOSIDES 2 TABLET: 50; 8.6 TABLET, COATED ORAL at 18:25

## 2017-02-20 RX ADMIN — CETIRIZINE HYDROCHLORIDE 10 MG: 10 TABLET, FILM COATED ORAL at 18:25

## 2017-02-20 NOTE — PLAN OF CARE
Problem: Patient Care Overview (Adult)  Goal: Plan of Care Review  Outcome: Ongoing (interventions implemented as appropriate)    02/20/17 1050   Coping/Psychosocial Response Interventions   Plan Of Care Reviewed With patient   Patient Care Overview   Progress no change       Goal: Adult Individualization and Mutuality  Outcome: Ongoing (interventions implemented as appropriate)    02/20/17 1050   Individualization   Patient Specific Preferences NA       Goal: Discharge Needs Assessment  Outcome: Ongoing (interventions implemented as appropriate)    02/20/17 1050   Discharge Needs Assessment   Concerns To Be Addressed denies needs/concerns at this time   Self-Care   Equipment Currently Used at Home glucometer;cane, straight         Problem: Perioperative Period (Adult)  Goal: Signs and Symptoms of Listed Potential Problems Will be Absent or Manageable (Perioperative Period)  Outcome: Ongoing (interventions implemented as appropriate)    02/20/17 1050   Perioperative Period   Problems Assessed (Perioperative Period) pain;wound complications;embolism;hemorrhage;hypothermia;infection;physiologic stress response;situational response   Problems Present (Perioperative Period) pain

## 2017-02-20 NOTE — PLAN OF CARE
Problem: Patient Care Overview (Adult)  Goal: Plan of Care Review  Outcome: Ongoing (interventions implemented as appropriate)    02/20/17 1756   Coping/Psychosocial Response Interventions   Plan Of Care Reviewed With patient   Patient Care Overview   Progress no change   Outcome Evaluation   Outcome Summary/Follow up Plan BP runs high. pain control       Goal: Adult Individualization and Mutuality  Outcome: Ongoing (interventions implemented as appropriate)    02/20/17 1756   Individualization   Patient Specific Goals pain control and ambulation   Patient Specific Interventions provide pain meds and ambulate as tolerated       Goal: Discharge Needs Assessment  Outcome: Ongoing (interventions implemented as appropriate)    02/20/17 1756   Discharge Needs Assessment   Concerns To Be Addressed no discharge needs identified         Problem: Perioperative Period (Adult)  Goal: Signs and Symptoms of Listed Potential Problems Will be Absent or Manageable (Perioperative Period)  Outcome: Ongoing (interventions implemented as appropriate)    02/20/17 1756   Perioperative Period   Problems Assessed (Perioperative Period) all   Problems Present (Perioperative Period) pain         Problem: Knee Replacement, Total (Adult)  Goal: Signs and Symptoms of Listed Potential Problems Will be Absent or Manageable (Knee Replacement, Total)  Outcome: Ongoing (interventions implemented as appropriate)    02/20/17 1756   Knee Replacement, Total   Problems Assessed (Total Knee Replacement) all   Problems Present (Total Knee Replacement) pain         Problem: Fall Risk (Adult)  Goal: Identify Related Risk Factors and Signs and Symptoms  Outcome: Ongoing (interventions implemented as appropriate)    02/20/17 1756   Fall Risk   Fall Risk: Related Risk Factors gait/mobility problems;culprit medication(s)       Goal: Absence of Falls  Outcome: Ongoing (interventions implemented as appropriate)

## 2017-02-20 NOTE — OP NOTE
TOTAL KNEE ARTHROPLASTY  Procedure Note    Katharine Mitchell  2/20/2017    Pre-op Diagnosis: Osteoarthritis right knee primary generalized  Post-op Diagnosis:Same  Procedure: Right Total Knee Arthroplasty  Surgeon:  Lamont Morales MD  Anesthesia: General, Anesthesiologist: Tre Ambrocio MD  CRNA: Lamont Harp CRNA  Staff: Circulator: Keisha Calderon RN  Scrub Person: Mukul Barroso  Vendor Representative: Christiano Cano  Assistant: Trevor Mccarty CFA  Estimated Blood Loss: 80 mL  Specimens: * No orders in the log *  Drains: none  Complications: None    Components Utilized:   Naldo Persona Femoral Component 8 right cemented      Tibia 5 degree posterior sloped stemmed E cemented      Polyethylene Insert size 12 MC Vit E      Patella size 32 mm    Indication for Procedure:   The patient is a 56 y.o. female presents today for a total knee arthroplasty procedure because of failure to conservatively manage the patients pain for arthritis.  The patient was educated in risks of surgery that could include possible risk of infection, deep venous thrombosis, pulmonary embolism, fracture, neurovascular injury, leg length discrepancy, dislocation, possible persistent pain, need for additional surgeries, anesthetic risks, medical risks including heart attack and stroke, and death.  The discussion occurred in the office pre-operatively, and patient had the opportunity to ask questions, and concerns about the proposed surgery.  The patient also understood that medicine is not an exact science, and that outcomes of the surgical procedure may be less than desired. The patient wished to proceed.      Protocols for intravenous antibiotics and venous thrombosis were followed for this patient.  IV antibiotics were infused prior to surgery and will be discontinued within 24 hours of completion of the surgical procedure.  Thrombosis prophylaxis will be initiated within 24 hours of the completion of the surgical procedure.       Procedure:   After the patient was identified in the preoperative area, and the surgical site confirmed and marked, the patient was brought to the operating room on a stretcher.  They were placed supine on the operating room table and the above anesthetic was placed uneventfully.  A time-out procedure was performed.  The intravenous antibiotics infusion was completed.  A non-sterile tourniquet was placed on the operative thigh, and was prepped and draped in the usual sterile fashion.  An Esmarch was to exsanguinate the limb, and the tourniquet was inflated.     A 10 blade scalpel was used to make a longitudinal incision from above the patella to medial to the tibial tubercle.  A medial marlo-patellar arthrotomy was performed with another 10 blade scalpel.  The medial joint line was elevated subperiosteally with electrocautery and a ayala elevator.  The patellar fat pad was removed.  The patella was everted and osteotomy cut completed with oscillating saw.  The patella guide and drill was used to finish preparing the patella.  A patella protecting guide was placed, and the patella was everted off the side of the femur laterally.     The knee was then flexed and Carmella's line was drawn on the distal femur with electrocautery.  Then the drill was placed into the distal femur into the medullary canal.  The intramedullary distal femoral valgus cutting guide set to 5 degrees of femoral valgus was then placed into the medullary canal.  It was then pinned and the oscillating saw was used to make the osteotomy.  Then the anterior referencing distal femoral guide was then placed and the above femoral size was measured and 3 degrees of external rotation were drilled.  The 4 in 1 femoral cutting guide was placed and pinned and the oscillating saw was used to make the appropriate cuts.     Then the extramedullary cutting guide was placed aligned with the tibial eminence superiorly and the tibial shaft distally, pinned 4 mm  from the medial tibial plateau.  The oscillating saw was then used to complete the osteotomy cuts.   A laminar  was then placed medially and then laterally to remove the medial and lateral meniscus and the posterior osteophytes.  Then the tibial baseplate was placed on the tibial surface with a drop carmela to confirm an appropriate cut and size of implant.  It was then pinned externally rotated to the medial third of the tibial tubercle.  Then trial reduction was then performed with the above implants and was found to be stable in all planes.  The patella tracked centrally on the trochlear groove.    The lug holes were drilled in the distal femur and the tibia was drilled an broached in the typical fashion.  The trial components were then removed and the final implants were confirmed and opened.  The bone surfaces were then irrigated and dried.  Palacos cement was then mixed and placed on the cut bone surfaces and back side of the implants.  The implants were then impacted into place, and the trial polyethylene spacer was placed and the knee was placed into extension.  A stack of towels was placed under the ankle and the cement was allowed to harden. The tourniquet was then dropped.  Hemostasis was obtained.  The wound was then irrigated with the pulse lavage irrigation system with a 3 liter mixture of betadine and normal saline.  The local mixture was injected throughout the knee for post-operative pain control.  The final polyethylene implant was then inserted and the knee was flexed to 90 degrees and the arthrotomy was closed with #1 vicryl suture.  The deep dermis was closed with 2-0 vicryl, and the skin was closed with 3-0 Monocryl suture.  Skin glue was applied and sterile dressing were applied.   Sponge and needle counts were completed and were correct.  The patient was awaken from anesthetic and was returned to recovery in stable condition.    Lamont Morales MD     Date: 2/20/2017  Time: 2:47 PM

## 2017-02-20 NOTE — ANESTHESIA PROCEDURE NOTES
Airway  Urgency: elective    Date/Time: 2/20/2017 12:47 PM  Airway not difficult    General Information and Staff    Patient location during procedure: OR  Anesthesiologist: LM EDMOND  CRNA: GUADALUPE DÍAZ    Indications and Patient Condition  Indications for airway management: airway protection    Preoxygenated: yes  Mask difficulty assessment: 1 - vent by mask    Final Airway Details  Final airway type: endotracheal airway      Successful airway: ETT  Cuffed: yes   Successful intubation technique: direct laryngoscopy  Endotracheal tube insertion site: oral  Blade: Wheat  Blade size: #2  ETT size: 7.0 mm  Cormack-Lehane Classification: grade I - full view of glottis  Placement verified by: chest auscultation   Cuff volume (mL): 7  Measured from: lips  ETT to lips (cm): 20  Number of attempts at approach: 1    Additional Comments  Teeth & mucosa intact

## 2017-02-20 NOTE — ANESTHESIA POSTPROCEDURE EVALUATION
Patient: Katharine Mitchell    Procedure Summary     Date Anesthesia Start Anesthesia Stop Room / Location    02/20/17 1238 [id number] [facility] OR [facility] / [facility]       Procedure Diagnosis Surgeon Provider    RT TOTAL KNEE ARTHROPLASTY (Right Knee) No diagnosis on file. MD Tre Clay MD          Anesthesia Type: general  Last vitals  BP (!) 179/105 (labetalol given see mar) (02/20/17 1605)    Temp      Pulse 93 (02/20/17 1605)   Resp 16 (02/20/17 1605)    SpO2 99 % (02/20/17 1605)      Post Anesthesia Care and Evaluation    Patient location during evaluation: PACU  Patient participation: complete - patient participated  Level of consciousness: awake and alert  Pain management: adequate  Airway patency: patent  Anesthetic complications: No anesthetic complications    Cardiovascular status: acceptable  Respiratory status: acceptable  Hydration status: acceptable

## 2017-02-20 NOTE — ANESTHESIA PREPROCEDURE EVALUATION
Anesthesia Evaluation     Patient summary reviewed and Nursing notes reviewed      Airway   Mallampati: II  Dental      Comment: Multiple capped teeth      Pulmonary - normal exam   (+) sleep apnea,   Cardiovascular - normal exam    ECG reviewed    (+) hypertension,     ROS comment: Normal stress echo    Neuro/Psych  GI/Hepatic/Renal/Endo    (+) morbid obesity, diabetes mellitus type 2,     Musculoskeletal     Abdominal   (+) obese,    Substance History      OB/GYN          Other   (+) arthritis                                 Anesthesia Plan    ASA 3     general     intravenous induction   Anesthetic plan and risks discussed with patient.

## 2017-02-21 LAB
ANION GAP SERPL CALCULATED.3IONS-SCNC: 17.6 MMOL/L
BUN BLD-MCNC: 12 MG/DL (ref 6–20)
BUN/CREAT SERPL: 13.8 (ref 7–25)
CALCIUM SPEC-SCNC: 8.7 MG/DL (ref 8.6–10.5)
CHLORIDE SERPL-SCNC: 93 MMOL/L (ref 98–107)
CO2 SERPL-SCNC: 22.4 MMOL/L (ref 22–29)
CREAT BLD-MCNC: 0.87 MG/DL (ref 0.57–1)
GFR SERPL CREATININE-BSD FRML MDRD: 81 ML/MIN/1.73
GLUCOSE BLD-MCNC: 196 MG/DL (ref 65–99)
HCT VFR BLD AUTO: 38.2 % (ref 35.6–45.5)
HGB BLD-MCNC: 13.6 G/DL (ref 11.9–15.5)
POTASSIUM BLD-SCNC: 4.9 MMOL/L (ref 3.5–5.2)
SODIUM BLD-SCNC: 133 MMOL/L (ref 136–145)

## 2017-02-21 PROCEDURE — 25010000002 HYDROMORPHONE PER 4 MG: Performed by: ORTHOPAEDIC SURGERY

## 2017-02-21 PROCEDURE — 25810000003 POTASSIUM CHLORIDE PER 2 MEQ: Performed by: ORTHOPAEDIC SURGERY

## 2017-02-21 PROCEDURE — 25010000002 ENOXAPARIN PER 10 MG: Performed by: ORTHOPAEDIC SURGERY

## 2017-02-21 PROCEDURE — 85018 HEMOGLOBIN: CPT | Performed by: ORTHOPAEDIC SURGERY

## 2017-02-21 PROCEDURE — 97161 PT EVAL LOW COMPLEX 20 MIN: CPT

## 2017-02-21 PROCEDURE — 80048 BASIC METABOLIC PNL TOTAL CA: CPT | Performed by: ORTHOPAEDIC SURGERY

## 2017-02-21 PROCEDURE — 97110 THERAPEUTIC EXERCISES: CPT

## 2017-02-21 PROCEDURE — 85014 HEMATOCRIT: CPT | Performed by: ORTHOPAEDIC SURGERY

## 2017-02-21 RX ADMIN — METFORMIN HYDROCHLORIDE 500 MG: 500 TABLET ORAL at 18:18

## 2017-02-21 RX ADMIN — NIFEDIPINE 60 MG: 60 TABLET, FILM COATED, EXTENDED RELEASE ORAL at 08:13

## 2017-02-21 RX ADMIN — FLUTICASONE PROPIONATE 2 SPRAY: 50 SPRAY, METERED NASAL at 06:47

## 2017-02-21 RX ADMIN — OXYCODONE HYDROCHLORIDE AND ACETAMINOPHEN 2 TABLET: 10; 325 TABLET ORAL at 22:23

## 2017-02-21 RX ADMIN — METFORMIN HYDROCHLORIDE 500 MG: 500 TABLET ORAL at 08:13

## 2017-02-21 RX ADMIN — OXYCODONE HYDROCHLORIDE AND ACETAMINOPHEN 2 TABLET: 10; 325 TABLET ORAL at 01:41

## 2017-02-21 RX ADMIN — OXYCODONE HYDROCHLORIDE AND ACETAMINOPHEN 2 TABLET: 10; 325 TABLET ORAL at 18:19

## 2017-02-21 RX ADMIN — CEFAZOLIN 3 G: 1 INJECTION, POWDER, FOR SOLUTION INTRAMUSCULAR; INTRAVENOUS; PARENTERAL at 05:06

## 2017-02-21 RX ADMIN — POTASSIUM CHLORIDE AND SODIUM CHLORIDE 100 ML/HR: 450; 150 INJECTION, SOLUTION INTRAVENOUS at 05:07

## 2017-02-21 RX ADMIN — HYDROMORPHONE HYDROCHLORIDE 0.5 MG: 1 INJECTION, SOLUTION INTRAMUSCULAR; INTRAVENOUS; SUBCUTANEOUS at 05:07

## 2017-02-21 RX ADMIN — CETIRIZINE HYDROCHLORIDE 10 MG: 10 TABLET, FILM COATED ORAL at 08:13

## 2017-02-21 RX ADMIN — SPIRONOLACTONE 50 MG: 25 TABLET, FILM COATED ORAL at 06:47

## 2017-02-21 RX ADMIN — NIFEDIPINE 60 MG: 60 TABLET, FILM COATED, EXTENDED RELEASE ORAL at 18:18

## 2017-02-21 RX ADMIN — OXYCODONE HYDROCHLORIDE AND ACETAMINOPHEN 2 TABLET: 10; 325 TABLET ORAL at 06:47

## 2017-02-21 RX ADMIN — ENOXAPARIN SODIUM 40 MG: 40 INJECTION SUBCUTANEOUS at 08:13

## 2017-02-21 RX ADMIN — HYDROMORPHONE HYDROCHLORIDE 0.5 MG: 1 INJECTION, SOLUTION INTRAMUSCULAR; INTRAVENOUS; SUBCUTANEOUS at 08:17

## 2017-02-21 RX ADMIN — DOCUSATE SODIUM -SENNOSIDES 2 TABLET: 50; 8.6 TABLET, COATED ORAL at 18:18

## 2017-02-21 RX ADMIN — DOCUSATE SODIUM -SENNOSIDES 2 TABLET: 50; 8.6 TABLET, COATED ORAL at 08:13

## 2017-02-21 RX ADMIN — OXYCODONE HYDROCHLORIDE AND ACETAMINOPHEN 2 TABLET: 10; 325 TABLET ORAL at 14:55

## 2017-02-21 RX ADMIN — MULTIPLE VITAMINS W/ MINERALS TAB 1 TABLET: TAB at 08:13

## 2017-02-21 RX ADMIN — OXYCODONE HYDROCHLORIDE AND ACETAMINOPHEN 2 TABLET: 10; 325 TABLET ORAL at 10:58

## 2017-02-21 NOTE — PROGRESS NOTES
Acute Care - Physical Therapy Treatment Note  [facility]     Patient Name: Katharine Mitchell  : 1960  MRN: [id number]  Today's Date: 2017  Onset of Illness/Injury or Date of Surgery Date: 17          Admit Date: 2017    Visit Dx:  No diagnosis found.  Patient Active Problem List   Diagnosis   • Abnormal EKG   • Essential hypertension   • Preop cardiovascular exam   • Osteoarthritis of right knee               Adult Rehabilitation Note       17 1512          Rehab Assessment/Intervention    Discipline physical therapy assistant  -ANTHONY      Document Type therapy note (daily note)  -ANTHONY      Subjective Information agree to therapy  -ANTHONY      Patient Effort, Rehab Treatment good  -ANTHONY      Precautions/Limitations fall precautions  -ANTHONY      Recorded by [ANTHONY] Nicholas Mackay PTA      Pain Assessment    Pain Assessment 0-10  -ANTHONY      Pain Score 8  -ANTHONY      Pain Type Acute pain;Surgical pain  -ANTHONY      Pain Location Knee  -ANTHONY      Pain Orientation Right  -ANTHONY      Pain Intervention(s) Ambulation/increased activity;Repositioned;Rest  -ANTHONY      Response to Interventions tolerated  -ANTHONY      Recorded by [ANTHONY] Nicholas Mackay PTA      Cognitive Assessment/Intervention    Current Cognitive/Communication Assessment functional  -ANTHONY      Orientation Status oriented x 4  -ANTHONY      Follows Commands/Answers Questions 100% of the time  -ANTHONY      Personal Safety WNL/WFL  -ANTHONY      Personal Safety Interventions fall prevention program maintained;gait belt;nonskid shoes/slippers when out of bed  -ANTHONY      Recorded by [ANTHONY] Nicholas Mackay PTA      ROM (Range of Motion)    General ROM lower extremity range of motion deficits identified  -ANTHONY      Recorded by [ANTHONY] Nicholas Mackay PTA      General LE Assessment    ROM knee, right: LE ROM deficit  -ANTHONY      ROM Detail 21-70'  -ANTHONY      Recorded by [ANTHONY] Nicholas Mackay PTA      Bed Mobility, Assessment/Treatment    Bed Mob, Supine to Sit, [place] not tested  -ANTHONY      Bed Mob, Sit to  Supine, [place] not tested  -ANTHONY      Recorded by [ANTHONY] Nicholas Mackay PTA      Transfer Assessment/Treatment    Transfers, Sit-Stand [place] contact guard assist;verbal cues required  -ANTHONY      Transfers, Stand-Sit [place] contact guard assist;verbal cues required  -ANTHONY      Transfers, Sit-Stand-Sit, Assist Device rolling walker  -ANTHONY      Transfer, Impairments strength decreased;impaired balance;pain  -ANTHONY      Recorded by [ANTHONY] Nicholas Mackay PTA      Gait Assessment/Treatment    Gait, [place] Level contact guard assist;verbal cues required  -ANTHONY      Gait, Assistive Device rolling walker  -ANTHONY      Gait, Distance (Feet) 35  -ANTHONY      Gait, Gait Deviations tiara decreased;decreased heel strike;forward flexed posture;antalgic  -ANTHONY      Gait, Safety Issues weight-shifting ability decreased;step length decreased  -ANTHONY      Gait, Impairments strength decreased;impaired balance  -ANTHONY      Recorded by [ANTHONY] Nicholas Mackay PTA      Therapy Exercises    Exercise Protocols total knee  -ANTHONY      Total Knee Exercises right:;15 reps;completed protocol  -ANTHONY      Recorded by [ANTHONY] Nicholas Mackay PTA      Positioning and Restraints    Pre-Treatment Position sitting in chair/recliner  -ANTHONY      Post Treatment Position chair  -ANTHONY      In Chair reclined;call light within reach;encouraged to call for assist  -ANTHONY      Recorded by [ANTHONY] Nicholas Mackay PTA        User Key  (r) = Recorded By, (t) = Taken By, (c) = Cosigned By    Initials Name Effective Dates    ANTHONY Mackay PTA 04/24/15 -                 IP PT Goals       02/21/17 1130          Bed Mobility PT LTG    Bed Mobility PT LTG, Date Established 02/21/17  -AR      Bed Mobility PT LTG, Time to Achieve 1 wk  -AR      Bed Mobility PT LTG, Activity Type supine to sit/sit to supine  -AR      Bed Mobility PT LTG, [place] Level supervision required  -AR      Transfer Training PT LTG    Transfer Training PT LTG, Date Established 02/21/17  -AR      Transfer Training PT LTG, Time to  Achieve 1 wk  -AR      Transfer Training PT LTG, Activity Type sit to stand/stand to sit;bed to chair /chair to bed  -AR      Transfer Training PT LTG, [place] Level supervision required  -AR      Transfer Training PT LTG, Assist Device walker, rolling  -AR      Gait Training PT LTG    Gait Training Goal PT LTG, Date Established 02/21/17  -AR      Gait Training Goal PT LTG, Time to Achieve 1 wk  -AR      Gait Training Goal PT LTG, [place] Level supervision required  -AR      Gait Training Goal PT LTG, Assist Device walker, rolling  -AR      Gait Training Goal PT LTG, Distance to Achieve 150  -AR      Range of Motion PT LTG    Range of Motion Goal PT LTG, Date Established 02/21/17  -AR      Range of Motion Goal PT LTG, Time to Achieve 1 wk  -AR      Range fo Motion Goal PT LTG, Joint R knee  -AR      Range of Motion Goal PT LTG, AROM Measure -5-90  -AR        User Key  (r) = Recorded By, (t) = Taken By, (c) = Cosigned By    Initials Name Provider Type    SHILA Taveras PT Physical Therapist          Physical Therapy Education     Title: PT OT SLP Therapies (Done)     Topic: Physical Therapy (Done)     Point: Mobility training (Done)    Learning Progress Summary    Learner Readiness Method Response Comment Documented by Status   Patient Acceptance E VU,NR  ANTHONY 02/21/17 1618 Done    Acceptance E NR  AR 02/21/17 1131 Active               Point: Home exercise program (Done)    Learning Progress Summary    Learner Readiness Method Response Comment Documented by Status   Patient Acceptance E VU,NR  ANTHONY 02/21/17 1618 Done    Acceptance E NR  AR 02/21/17 1131 Active               Point: Body mechanics (Done)    Learning Progress Summary    Learner Readiness Method Response Comment Documented by Status   Patient Acceptance E VU,NR  ANTHONY 02/21/17 1618 Done    Acceptance E NR  AR 02/21/17 1131 Active               Point: Precautions (Done)    Learning Progress Summary    Learner Readiness Method Response Comment  Documented by Status   Patient Acceptance E VU,NR  ANTHONY 02/21/17 1618 Done    Acceptance E NR  AR 02/21/17 1131 Active                      User Key     Initials Effective Dates Name Provider Type Discipline    AR 06/27/16 -  Bessy Taveras PT Physical Therapist PT    ANTHONY 04/24/15 -  Nicholas Mackay PTA Physical Therapy Assistant PT                    PT Recommendation and Plan  Anticipated Discharge Disposition: home with assist, home with home health  Planned Therapy Interventions: bed mobility training, balance training, gait training, home exercise program, patient/family education, ROM (Range of Motion), stair training, strengthening  PT Frequency: 2 times/day             Outcome Measures       02/21/17 1100          How much help from another person do you currently need...    Turning from your back to your side while in flat bed without using bedrails? 3  -AR      Moving from lying on back to sitting on the side of a flat bed without bedrails? 3  -AR      Moving to and from a bed to a chair (including a wheelchair)? 3  -AR      Standing up from a chair using your arms (e.g., wheelchair, bedside chair)? 3  -AR      Climbing 3-5 steps with a railing? 2  -AR      To walk in hospital room? 3  -AR      AM-PAC 6 Clicks Score 17  -AR      Functional Assessment    Outcome Measure Options AM-PAC 6 Clicks Basic Mobility (PT)  -AR        User Key  (r) = Recorded By, (t) = Taken By, (c) = Cosigned By    Initials Name Provider Type    AR Bessy Taveras PT Physical Therapist           Time Calculation:         PT Charges       02/21/17 1618 02/21/17 1132       Time Calculation    Start Time 1512  -ANTHONY 1102  -AR     Stop Time 1600  -ANTHONY 1132  -AR     Time Calculation (min) 48 min  -ANTHONY 30 min  -AR     PT Received On 02/21/17  -ANTHONY 02/21/17  -AR     PT - Next Appointment 02/22/17  -ANTHONY 02/21/17  -AR     PT Goal Re-Cert Due Date  02/22/17  -AR       User Key  (r) = Recorded By, (t) = Taken By, (c) = Cosigned By    Initials Name  Provider Type    AR Bessy Taveras, PT Physical Therapist    ANTHONY Mackay, [place] Physical Therapy Assistant              PT G-Codes  Outcome Measure Options: AM-PAC 6 Clicks Basic Mobility (PT)    Nicholas Mackay PTA  2/21/2017

## 2017-02-21 NOTE — PROGRESS NOTES
Procedure(s):  RT TOTAL KNEE ARTHROPLASTY     LOS: 1 day     Subjective :   Complains of pain.    Objective :    Vital signs in last 24 hours:  Vitals:    02/20/17 1857 02/20/17 2303 02/21/17 0335 02/21/17 0500   BP: 168/90 176/94 180/96 150/80   BP Location: Left arm Left arm Left arm Left arm   Patient Position: Lying Lying Lying Lying   Pulse: 94 95 97 97   Resp: 16 16 16 18   Temp: 97.5 °F (36.4 °C) 98 °F (36.7 °C) 97.6 °F (36.4 °C) 97.5 °F (36.4 °C)   TempSrc: Oral Oral Oral Oral   SpO2: 98% 98% 99% 98%   Weight:       Height:           PHYSICAL EXAM:  Patient is calm, in no acute distress, awake and oriented x 3.  Dressing is clean, dry and intact.  No signs of infection.  Swelling is appropriate in amount.  Ecchymosis is appropriate in amount.  Homans test is negative.  Patient is neurovascularly intact distally.    LABS:    Results from last 7 days  Lab Units 02/21/17  0352   HEMOGLOBIN g/dL 13.6   HEMATOCRIT % 38.2       Results from last 7 days  Lab Units 02/21/17  0352   SODIUM mmol/L 133*   POTASSIUM mmol/L 4.9   CHLORIDE mmol/L 93*   TOTAL CO2 mmol/L 22.4   BUN mg/dL 12   CREATININE mg/dL 0.87   GLUCOSE mg/dL 196*   CALCIUM mg/dL 8.7           ASSESSMENT:  Status post Procedure(s):  RT TOTAL KNEE ARTHROPLASTY      Plan:  Continue Physical Therapy, increase mobility and range of motion as tolerated.  Continue SCDs, Continue Lovenox for DVT prophylaxis while inpatient.  Dispo planning for home in 1-2 days.    Lamont Morales MD    Date: 2/21/2017  Time: 6:34 AM

## 2017-02-21 NOTE — PLAN OF CARE
Problem: Patient Care Overview (Adult)  Goal: Adult Individualization and Mutuality    02/21/17 1309   Individualization   Patient Specific Goals pain control    Patient Specific Interventions PROVIDE PO AND IV MEDS TO CONTROL PAIN AS NEEDED       Goal: Discharge Needs Assessment  Outcome: Ongoing (interventions implemented as appropriate)    02/21/17 1309   Discharge Needs Assessment   Concerns To Be Addressed no discharge needs identified         Problem: Perioperative Period (Adult)  Goal: Signs and Symptoms of Listed Potential Problems Will be Absent or Manageable (Perioperative Period)  Outcome: Ongoing (interventions implemented as appropriate)    02/21/17 1309   Perioperative Period   Problems Assessed (Perioperative Period) all   Problems Present (Perioperative Period) pain         Problem: Knee Replacement, Total (Adult)  Goal: Signs and Symptoms of Listed Potential Problems Will be Absent or Manageable (Knee Replacement, Total)  Outcome: Ongoing (interventions implemented as appropriate)    02/21/17 1309   Knee Replacement, Total   Problems Assessed (Total Knee Replacement) all   Problems Present (Total Knee Replacement) pain         Problem: Fall Risk (Adult)  Goal: Identify Related Risk Factors and Signs and Symptoms  Outcome: Outcome(s) achieved Date Met:  02/21/17 02/21/17 1309   Fall Risk   Fall Risk: Related Risk Factors culprit medication(s);gait/mobility problems   Fall Risk: Signs and Symptoms presence of risk factors       Goal: Absence of Falls  Outcome: Ongoing (interventions implemented as appropriate)    02/21/17 1309   Fall Risk (Adult)   Absence of Falls achieves outcome

## 2017-02-21 NOTE — PLAN OF CARE
Problem: Patient Care Overview (Adult)  Goal: Plan of Care Review    02/21/17 1130   Coping/Psychosocial Response Interventions   Plan Of Care Reviewed With patient   Outcome Evaluation   Outcome Summary/Follow up Plan Pt demonstrates impaired strength, ROM, gait pattern, and independence w/ mobility s/p TKA. PT to maximize independence.          Problem: Inpatient Physical Therapy  Goal: Bed Mobility Goal LTG- PT    02/21/17 1130   Bed Mobility PT LTG   Bed Mobility PT LTG, Date Established 02/21/17   Bed Mobility PT LTG, Time to Achieve 1 wk   Bed Mobility PT LTG, Activity Type supine to sit/sit to supine   Bed Mobility PT LTG, [place] Level supervision required       Goal: Transfer Training Goal 1 LTG- PT    02/21/17 1130   Transfer Training PT LTG   Transfer Training PT LTG, Date Established 02/21/17   Transfer Training PT LTG, Time to Achieve 1 wk   Transfer Training PT LTG, Activity Type sit to stand/stand to sit;bed to chair /chair to bed   Transfer Training PT LTG, [place] Level supervision required   Transfer Training PT LTG, Assist Device walker, rolling       Goal: Gait Training Goal LTG- PT    02/21/17 1130   Gait Training PT LTG   Gait Training Goal PT LTG, Date Established 02/21/17   Gait Training Goal PT LTG, Time to Achieve 1 wk   Gait Training Goal PT LTG, [place] Level supervision required   Gait Training Goal PT LTG, Assist Device walker, rolling   Gait Training Goal PT LTG, Distance to Achieve 150       Goal: Range of Motion Goal LTG- PT    02/21/17 1130   Range of Motion PT LTG   Range of Motion Goal PT LTG, Date Established 02/21/17   Range of Motion Goal PT LTG, Time to Achieve 1 wk   Range fo Motion Goal PT LTG, Joint R knee   Range of Motion Goal PT LTG, AROM Measure -5-90

## 2017-02-21 NOTE — PLAN OF CARE
Problem: Patient Care Overview (Adult)  Goal: Plan of Care Review  Outcome: Ongoing (interventions implemented as appropriate)    02/21/17 0357   Coping/Psychosocial Response Interventions   Plan Of Care Reviewed With patient   Patient Care Overview   Progress progress toward functional goals as expected   Outcome Evaluation   Outcome Summary/Follow up Plan Pt is a post op of a right total knee. Pt contiues to have pain control issues. Pt has been taking PO painmeds a one time dose of Dilaudid IV given which seem to provide some relief. Pt has been ambulating to the bathroom with an assist of 1.        Goal: Adult Individualization and Mutuality  Outcome: Ongoing (interventions implemented as appropriate)  Goal: Discharge Needs Assessment  Outcome: Ongoing (interventions implemented as appropriate)    Problem: Perioperative Period (Adult)  Goal: Signs and Symptoms of Listed Potential Problems Will be Absent or Manageable (Perioperative Period)  Outcome: Ongoing (interventions implemented as appropriate)    Problem: Knee Replacement, Total (Adult)  Goal: Signs and Symptoms of Listed Potential Problems Will be Absent or Manageable (Knee Replacement, Total)  Outcome: Ongoing (interventions implemented as appropriate)    Problem: Fall Risk (Adult)  Goal: Absence of Falls  Outcome: Ongoing (interventions implemented as appropriate)

## 2017-02-21 NOTE — PROGRESS NOTES
Acute Care - Physical Therapy Initial Evaluation  [facility]     Patient Name: Katharine Mitchell  : 1960  MRN: [id number]  Today's Date: 2017   Onset of Illness/Injury or Date of Surgery Date: 17            Admit Date: 2017     Visit Dx:  No diagnosis found.  Patient Active Problem List   Diagnosis   • Abnormal EKG   • Essential hypertension   • Preop cardiovascular exam   • Osteoarthritis of right knee     Past Medical History   Diagnosis Date   • Arthritis    • Diabetes mellitus    • Hypertension    • Knee pain, right    • Sleep apnea      DOES NOT WEAR CPAP   • UTI (urinary tract infection)      TREATED FOR UTI 2017     Past Surgical History   Procedure Laterality Date   • Knee arthroscopy Left    • Gallbladder surgery     • Pilonidal cystectomy     • Pr total knee arthroplasty Right 2017     Procedure: RT TOTAL KNEE ARTHROPLASTY;  Surgeon: Lamont Morales MD;  Location: [facility] MAIN OR;  Service: Orthopedics          PT ASSESSMENT (last 72 hours)      PT Evaluation       17 1126 17 0357    Rehab Evaluation    Document Type evaluation  -AR     Subjective Information agree to therapy;complains of;pain  -AR     Patient Effort, Rehab Treatment good  -AR     General Information    Patient Profile Review yes  -AR     Onset of Illness/Injury or Date of Surgery Date 17  -AR     Precautions/Limitations fall precautions  -AR     Prior Level of Function independent:  -AR     Equipment Currently Used at Home  cane, straight;glucometer  -JF    Barriers to Rehab none identified  -AR     Living Environment    Lives With alone  -AR     Living Arrangements house  -AR     Home Accessibility stairs to enter home  -AR     Number of Stairs to Enter Home 3  -AR     Stair Railings at Home inside, present on left side  -AR     Transportation Available  car  -JF    Living Environment Comment planning to have assist bonnie/night for few days at least  -AR     Clinical Impression     Patient/Family Goals Statement DC home  -AR     Criteria for Skilled Therapeutic Interventions Met yes  -AR     Pathology/Pathophysiology Noted (Describe Specifically for Each System) musculoskeletal  -AR     Impairments Found (describe specific impairments) aerobic capacity/endurance;gait, locomotion, and balance;ROM  -AR     Rehab Potential good, to achieve stated therapy goals  -AR     Pain Assessment    Pain Assessment 0-10  -AR     Pain Score 5  -AR     Pain Type Surgical pain  -AR     Pain Intervention(s) Repositioned;Cold applied  -AR     Cognitive Assessment/Intervention    Current Cognitive/Communication Assessment functional  -AR     Orientation Status oriented x 4  -AR     Follows Commands/Answers Questions 100% of the time  -AR     ROM (Range of Motion)    General ROM --   WFL except R knee  -AR     MMT (Manual Muscle Testing)    General MMT Assessment --   WFL except R LE  -AR     Bed Mobility, Assessment/Treatment    Bed Mob, Supine to Sit, [place] not tested  -AR     Bed Mob, Sit to Supine, [place] not tested  -AR     Transfer Assessment/Treatment    Transfers, Sit-Stand [place] minimum assist (75% patient effort)  -AR     Transfers, Stand-Sit [place] minimum assist (75% patient effort)  -AR     Transfers, Sit-Stand-Sit, Assist Device rolling walker  -AR     Gait Assessment/Treatment    Gait, [place] Level minimum assist (75% patient effort)  -AR     Gait, Assistive Device rolling walker  -AR     Gait, Distance (Feet) 30  -AR     Gait, Gait Deviations tiara decreased;decreased heel strike;forward flexed posture;antalgic  -AR     Gait, Safety Issues weight-shifting ability decreased;step length decreased  -AR     Gait, Impairments strength decreased;impaired balance  -AR     Therapy Exercises    Exercise Protocols total knee  -AR     Total Knee Exercises right:;10 reps;completed protocol  -AR     Positioning and Restraints    Pre-Treatment Position sitting in chair/recliner   -AR     Post Treatment Position chair  -AR     In Chair reclined;sitting;call light within reach;encouraged to call for assist  -AR       02/20/17 1100 02/20/17 1050    General Information    Equipment Currently Used at Home  glucometer;cane, straight  -    Living Environment    Lives With alone  -     Living Arrangements house  -     Home Accessibility bed and bath on same level  -     Stair Railings at Home outside, present at both sides  -     Type of Financial/Environmental Concern none  -     Transportation Available car  -       User Key  (r) = Recorded By, (t) = Taken By, (c) = Cosigned By    Initials Name Provider Type     Parvin Javier, RN Registered Nurse    SHILA Taveras PT Physical Therapist    HILARIA Gomez RN Registered Nurse          Physical Therapy Education     Title: PT OT SLP Therapies (Active)     Topic: Physical Therapy (Active)     Point: Mobility training (Active)    Learning Progress Summary    Learner Readiness Method Response Comment Documented by Status   Patient Acceptance E NR  AR 02/21/17 1131 Active               Point: Home exercise program (Active)    Learning Progress Summary    Learner Readiness Method Response Comment Documented by Status   Patient Acceptance E NR  AR 02/21/17 1131 Active               Point: Body mechanics (Active)    Learning Progress Summary    Learner Readiness Method Response Comment Documented by Status   Patient Acceptance E NR  AR 02/21/17 1131 Active               Point: Precautions (Active)    Learning Progress Summary    Learner Readiness Method Response Comment Documented by Status   Patient Acceptance E NR  AR 02/21/17 1131 Active                      User Key     Initials Effective Dates Name Provider Type Discipline    AR 06/27/16 -  Bessy Taveras PT Physical Therapist PT                PT Recommendation and Plan  Anticipated Discharge Disposition: home with assist, home with home health  Planned Therapy  Interventions: bed mobility training, balance training, gait training, home exercise program, patient/family education, ROM (Range of Motion), stair training, strengthening  PT Frequency: 2 times/day  Plan of Care Review  Plan Of Care Reviewed With: patient  Outcome Summary/Follow up Plan: Pt demonstrates impaired strength, ROM, gait pattern, and independence w/ mobility s/p TKA. PT to maximize independence.            IP PT Goals       02/21/17 1130          Bed Mobility PT LTG    Bed Mobility PT LTG, Date Established 02/21/17  -AR      Bed Mobility PT LTG, Time to Achieve 1 wk  -AR      Bed Mobility PT LTG, Activity Type supine to sit/sit to supine  -AR      Bed Mobility PT LTG, [place] Level supervision required  -AR      Transfer Training PT LTG    Transfer Training PT LTG, Date Established 02/21/17  -AR      Transfer Training PT LTG, Time to Achieve 1 wk  -AR      Transfer Training PT LTG, Activity Type sit to stand/stand to sit;bed to chair /chair to bed  -AR      Transfer Training PT LTG, [place] Level supervision required  -AR      Transfer Training PT LTG, Assist Device walker, rolling  -AR      Gait Training PT LTG    Gait Training Goal PT LTG, Date Established 02/21/17  -AR      Gait Training Goal PT LTG, Time to Achieve 1 wk  -AR      Gait Training Goal PT LTG, [place] Level supervision required  -AR      Gait Training Goal PT LTG, Assist Device walker, rolling  -AR      Gait Training Goal PT LTG, Distance to Achieve 150  -AR      Range of Motion PT LTG    Range of Motion Goal PT LTG, Date Established 02/21/17  -AR      Range of Motion Goal PT LTG, Time to Achieve 1 wk  -AR      Range fo Motion Goal PT LTG, Joint R knee  -AR      Range of Motion Goal PT LTG, AROM Measure -5-90  -AR        User Key  (r) = Recorded By, (t) = Taken By, (c) = Cosigned By    Initials Name Provider Type    SHILA Taveras PT Physical Therapist                Outcome Measures       02/21/17 1100          How  much help from another person do you currently need...    Turning from your back to your side while in flat bed without using bedrails? 3  -AR      Moving from lying on back to sitting on the side of a flat bed without bedrails? 3  -AR      Moving to and from a bed to a chair (including a wheelchair)? 3  -AR      Standing up from a chair using your arms (e.g., wheelchair, bedside chair)? 3  -AR      Climbing 3-5 steps with a railing? 2  -AR      To walk in hospital room? 3  -AR      AM-PAC 6 Clicks Score 17  -AR      Functional Assessment    Outcome Measure Options AM-PAC 6 Clicks Basic Mobility (PT)  -AR        User Key  (r) = Recorded By, (t) = Taken By, (c) = Cosigned By    Initials Name Provider Type    SHILA Taveras PT Physical Therapist           Time Calculation:         PT Charges       02/21/17 1132          Time Calculation    Start Time 1102  -AR      Stop Time 1132  -AR      Time Calculation (min) 30 min  -AR      PT Received On 02/21/17  -AR      PT - Next Appointment 02/21/17  -AR      PT Goal Re-Cert Due Date 02/22/17  -AR        User Key  (r) = Recorded By, (t) = Taken By, (c) = Cosigned By    Initials Name Provider Type    SHILA Taveras PT Physical Therapist          Therapy Charges for Today     Code Description Service Date Service Provider Modifiers Qty    24472194205 HC PT EVAL LOW COMPLEXITY 2 2/21/2017 Bessy Taveras PT GP 1    21361558993 HC PT THER PROC EA 15 MIN 2/21/2017 Bessy Taveras PT GP 1          PT G-Codes  Outcome Measure Options: AM-PAC 6 Clicks Basic Mobility (PT)      Bessy Taveras PT  2/21/2017

## 2017-02-21 NOTE — PROGRESS NOTES
Discharge Planning Assessment  [facility]     Patient Name: Katharine Mitchell  MRN: [id number]  Today's Date: 2/21/2017    Admit Date: 2/20/2017          Discharge Needs Assessment       02/21/17 1208    Living Environment    Lives With alone    Living Arrangements house    Living Arrangement Comments pt will have family staying with her post op    Discharge Needs Assessment    Concerns To Be Addressed basic needs concerns    Readmission Within The Last 30 Days no previous admission in last 30 days    Anticipated Changes Related to Illness none    Equipment Currently Used at Home cane, straight;glucometer    Discharge Facility/Level Of Care Needs home with home health    Transportation Available car;family or friend will provide            Discharge Plan       02/21/17 1209    Case Management/Social Work Plan    Plan [facility]    Patient/Family In Agreement With Plan yes    Additional Comments Spoke with pt, verified correct information on facesheet and explained the role of CCP. Pt would like to d/c home with [facility], referral given to Edilma with [facility] who states they are able to accept. Plan will be to d/c home with HH and family support.        Discharge Placement     Facility/Agency Request Status Selected? Address Phone Number Fax Number    [facility] Accepted    Yes [address] [address] [phone]          Modesta Farooq RN

## 2017-02-22 LAB
HCT VFR BLD AUTO: 35.6 % (ref 35.6–45.5)
HGB BLD-MCNC: 12.7 G/DL (ref 11.9–15.5)

## 2017-02-22 PROCEDURE — 25010000002 ENOXAPARIN PER 10 MG: Performed by: ORTHOPAEDIC SURGERY

## 2017-02-22 PROCEDURE — 97110 THERAPEUTIC EXERCISES: CPT

## 2017-02-22 PROCEDURE — 85018 HEMOGLOBIN: CPT | Performed by: ORTHOPAEDIC SURGERY

## 2017-02-22 PROCEDURE — 85014 HEMATOCRIT: CPT | Performed by: ORTHOPAEDIC SURGERY

## 2017-02-22 PROCEDURE — 97150 GROUP THERAPEUTIC PROCEDURES: CPT

## 2017-02-22 RX ADMIN — POTASSIUM CHLORIDE AND SODIUM CHLORIDE 100 ML/HR: 450; 150 INJECTION, SOLUTION INTRAVENOUS at 01:06

## 2017-02-22 RX ADMIN — MULTIPLE VITAMINS W/ MINERALS TAB 1 TABLET: TAB at 08:27

## 2017-02-22 RX ADMIN — FLUTICASONE PROPIONATE 2 SPRAY: 50 SPRAY, METERED NASAL at 08:26

## 2017-02-22 RX ADMIN — OXYCODONE HYDROCHLORIDE AND ACETAMINOPHEN 2 TABLET: 10; 325 TABLET ORAL at 06:34

## 2017-02-22 RX ADMIN — OXYCODONE HYDROCHLORIDE AND ACETAMINOPHEN 2 TABLET: 10; 325 TABLET ORAL at 02:25

## 2017-02-22 RX ADMIN — METFORMIN HYDROCHLORIDE 500 MG: 500 TABLET ORAL at 17:56

## 2017-02-22 RX ADMIN — NIFEDIPINE 60 MG: 60 TABLET, FILM COATED, EXTENDED RELEASE ORAL at 08:27

## 2017-02-22 RX ADMIN — ENOXAPARIN SODIUM 40 MG: 40 INJECTION SUBCUTANEOUS at 08:29

## 2017-02-22 RX ADMIN — NIFEDIPINE 60 MG: 60 TABLET, FILM COATED, EXTENDED RELEASE ORAL at 17:56

## 2017-02-22 RX ADMIN — CETIRIZINE HYDROCHLORIDE 10 MG: 10 TABLET, FILM COATED ORAL at 08:27

## 2017-02-22 RX ADMIN — OXYCODONE HYDROCHLORIDE AND ACETAMINOPHEN 2 TABLET: 10; 325 TABLET ORAL at 10:01

## 2017-02-22 RX ADMIN — DOCUSATE SODIUM -SENNOSIDES 2 TABLET: 50; 8.6 TABLET, COATED ORAL at 08:27

## 2017-02-22 RX ADMIN — SPIRONOLACTONE 50 MG: 25 TABLET, FILM COATED ORAL at 08:27

## 2017-02-22 RX ADMIN — OXYCODONE HYDROCHLORIDE AND ACETAMINOPHEN 2 TABLET: 10; 325 TABLET ORAL at 22:21

## 2017-02-22 RX ADMIN — OXYCODONE HYDROCHLORIDE AND ACETAMINOPHEN 2 TABLET: 10; 325 TABLET ORAL at 17:55

## 2017-02-22 RX ADMIN — OXYCODONE HYDROCHLORIDE AND ACETAMINOPHEN 2 TABLET: 10; 325 TABLET ORAL at 14:02

## 2017-02-22 RX ADMIN — METFORMIN HYDROCHLORIDE 500 MG: 500 TABLET ORAL at 08:27

## 2017-02-22 RX ADMIN — DOCUSATE SODIUM -SENNOSIDES 2 TABLET: 50; 8.6 TABLET, COATED ORAL at 17:56

## 2017-02-22 NOTE — PLAN OF CARE
Problem: Patient Care Overview (Adult)  Goal: Plan of Care Review  Outcome: Ongoing (interventions implemented as appropriate)    02/22/17 1529   Coping/Psychosocial Response Interventions   Plan Of Care Reviewed With patient   Patient Care Overview   Progress improving   Outcome Evaluation   Outcome Summary/Follow up Plan Temp max 100.2 Incouraged to use IS. IV fluids d/c Tolerated po intake without problem.          Problem: Knee Replacement, Total (Adult)  Goal: Signs and Symptoms of Listed Potential Problems Will be Absent or Manageable (Knee Replacement, Total)  Outcome: Ongoing (interventions implemented as appropriate)    Problem: Fall Risk (Adult)  Goal: Absence of Falls  Outcome: Ongoing (interventions implemented as appropriate)

## 2017-02-22 NOTE — PROGRESS NOTES
Procedure(s):  RT TOTAL KNEE ARTHROPLASTY     LOS: 2 days     Subjective :   Complains of pain, better today.    Objective :    Vital signs in last 24 hours:  Vitals:    02/21/17 1938 02/21/17 2322 02/22/17 0338 02/22/17 0348   BP: 160/83 156/83 137/87    BP Location: Left arm Left arm Left arm    Patient Position: Lying Lying Lying    Pulse: 114 104 115    Resp: 18 18 18 14   Temp: 99.7 °F (37.6 °C) 99.3 °F (37.4 °C) 99.2 °F (37.3 °C)    TempSrc: Oral Oral Oral    SpO2: 91% 97% 97%    Weight:       Height:           PHYSICAL EXAM:  Patient is calm, in no acute distress, awake and oriented x 3.  Dressing is clean, dry and intact.  No signs of infection.  Swelling is appropriate in amount.  Ecchymosis is appropriate in amount.  Homans test is negative.  Patient is neurovascularly intact distally.    LABS:    Results from last 7 days  Lab Units 02/22/17  0433   HEMOGLOBIN g/dL 12.7   HEMATOCRIT % 35.6       Results from last 7 days  Lab Units 02/21/17  0352   SODIUM mmol/L 133*   POTASSIUM mmol/L 4.9   CHLORIDE mmol/L 93*   TOTAL CO2 mmol/L 22.4   BUN mg/dL 12   CREATININE mg/dL 0.87   GLUCOSE mg/dL 196*   CALCIUM mg/dL 8.7           ASSESSMENT:  Status post Procedure(s):  RT TOTAL KNEE ARTHROPLASTY      Plan:  Continue Physical Therapy, increase mobility and range of motion as tolerated.  Continue SCDs, Continue Lovenox for DVT prophylaxis while inpatient.  Dispo planning for home tomorrow.    Lamont Morales MD    Date: 2/22/2017  Time: 5:43 AM

## 2017-02-22 NOTE — PLAN OF CARE
Problem: Patient Care Overview (Adult)  Goal: Plan of Care Review  Outcome: Ongoing (interventions implemented as appropriate)    02/22/17 0151   Coping/Psychosocial Response Interventions   Plan Of Care Reviewed With patient   Patient Care Overview   Progress improving   Outcome Evaluation   Outcome Summary/Follow up Plan vss, n/v status wnl, dsg c/d/i, ambulated in hallway, reports adequate pain control       Goal: Adult Individualization and Mutuality  Outcome: Ongoing (interventions implemented as appropriate)  Goal: Discharge Needs Assessment  Outcome: Ongoing (interventions implemented as appropriate)    Problem: Perioperative Period (Adult)  Goal: Signs and Symptoms of Listed Potential Problems Will be Absent or Manageable (Perioperative Period)  Outcome: Ongoing (interventions implemented as appropriate)    Problem: Knee Replacement, Total (Adult)  Goal: Signs and Symptoms of Listed Potential Problems Will be Absent or Manageable (Knee Replacement, Total)  Outcome: Ongoing (interventions implemented as appropriate)    Problem: Fall Risk (Adult)  Goal: Absence of Falls  Outcome: Ongoing (interventions implemented as appropriate)

## 2017-02-23 VITALS
BODY MASS INDEX: 49.88 KG/M2 | HEIGHT: 63 IN | TEMPERATURE: 97 F | SYSTOLIC BLOOD PRESSURE: 144 MMHG | HEART RATE: 109 BPM | OXYGEN SATURATION: 93 % | RESPIRATION RATE: 18 BRPM | WEIGHT: 281.5 LBS | DIASTOLIC BLOOD PRESSURE: 76 MMHG

## 2017-02-23 LAB
HCT VFR BLD AUTO: 34.5 % (ref 35.6–45.5)
HGB BLD-MCNC: 12.1 G/DL (ref 11.9–15.5)

## 2017-02-23 PROCEDURE — 97110 THERAPEUTIC EXERCISES: CPT

## 2017-02-23 PROCEDURE — 85018 HEMOGLOBIN: CPT | Performed by: ORTHOPAEDIC SURGERY

## 2017-02-23 PROCEDURE — 25010000002 ENOXAPARIN PER 10 MG: Performed by: ORTHOPAEDIC SURGERY

## 2017-02-23 PROCEDURE — 97150 GROUP THERAPEUTIC PROCEDURES: CPT

## 2017-02-23 PROCEDURE — 85014 HEMATOCRIT: CPT | Performed by: ORTHOPAEDIC SURGERY

## 2017-02-23 RX ORDER — SENNA AND DOCUSATE SODIUM 50; 8.6 MG/1; MG/1
2 TABLET, FILM COATED ORAL 2 TIMES DAILY PRN
Qty: 50 TABLET | Refills: 0 | Status: SHIPPED | OUTPATIENT
Start: 2017-02-23 | End: 2020-09-29

## 2017-02-23 RX ORDER — OXYCODONE AND ACETAMINOPHEN 10; 325 MG/1; MG/1
1-2 TABLET ORAL EVERY 4 HOURS PRN
Qty: 100 TABLET | Refills: 0 | Status: SHIPPED | OUTPATIENT
Start: 2017-02-23 | End: 2017-03-02

## 2017-02-23 RX ORDER — ASPIRIN 325 MG
325 TABLET ORAL DAILY
Qty: 42 TABLET | Refills: 0 | Status: SHIPPED | OUTPATIENT
Start: 2017-02-23 | End: 2017-04-06

## 2017-02-23 RX ADMIN — SPIRONOLACTONE 50 MG: 25 TABLET, FILM COATED ORAL at 08:53

## 2017-02-23 RX ADMIN — OXYCODONE HYDROCHLORIDE AND ACETAMINOPHEN 2 TABLET: 10; 325 TABLET ORAL at 06:20

## 2017-02-23 RX ADMIN — CETIRIZINE HYDROCHLORIDE 10 MG: 10 TABLET, FILM COATED ORAL at 08:53

## 2017-02-23 RX ADMIN — DOCUSATE SODIUM -SENNOSIDES 2 TABLET: 50; 8.6 TABLET, COATED ORAL at 08:52

## 2017-02-23 RX ADMIN — FLUTICASONE PROPIONATE 2 SPRAY: 50 SPRAY, METERED NASAL at 08:56

## 2017-02-23 RX ADMIN — NIFEDIPINE 60 MG: 60 TABLET, FILM COATED, EXTENDED RELEASE ORAL at 08:52

## 2017-02-23 RX ADMIN — OXYCODONE HYDROCHLORIDE AND ACETAMINOPHEN 2 TABLET: 10; 325 TABLET ORAL at 10:05

## 2017-02-23 RX ADMIN — MULTIPLE VITAMINS W/ MINERALS TAB 1 TABLET: TAB at 08:53

## 2017-02-23 RX ADMIN — OXYCODONE HYDROCHLORIDE AND ACETAMINOPHEN 2 TABLET: 10; 325 TABLET ORAL at 14:08

## 2017-02-23 RX ADMIN — METFORMIN HYDROCHLORIDE 500 MG: 500 TABLET ORAL at 08:53

## 2017-02-23 RX ADMIN — ENOXAPARIN SODIUM 40 MG: 40 INJECTION SUBCUTANEOUS at 08:53

## 2017-02-23 RX ADMIN — OXYCODONE HYDROCHLORIDE AND ACETAMINOPHEN 2 TABLET: 10; 325 TABLET ORAL at 02:28

## 2017-02-23 NOTE — THERAPY DISCHARGE NOTE
Acute Care - Physical Therapy Treatment Note/Discharge  [facility]     Patient Name: Katharine Mitchell  : 1960  MRN: [id number]  Today's Date: 2017  Onset of Illness/Injury or Date of Surgery Date: 17          Admit Date: 2017    Visit Dx:    ICD-10-CM ICD-9-CM   1. Primary osteoarthritis of right knee M17.11 715.16     Patient Active Problem List   Diagnosis   • Abnormal EKG   • Essential hypertension   • Preop cardiovascular exam   • Osteoarthritis of right knee       Physical Therapy Education     Title: PT OT SLP Therapies (Resolved)     Topic: Physical Therapy (Resolved)     Point: Mobility training (Resolved)    Learning Progress Summary    Learner Readiness Method Response Comment Documented by Status   Patient Eager E,TB,D,H RAJIV   17 1640 Done    Acceptance E,TB,D VU   17 1508 Done    Acceptance E VU,NR  ANTHONY 17 1618 Done    Acceptance E NR  AR 17 1131 Active               Point: Home exercise program (Resolved)    Learning Progress Summary    Learner Readiness Method Response Comment Documented by Status   Patient Eager E,TB,D,H VU   17 1640 Done    Acceptance E,TB,D VU   17 1508 Done    Acceptance E VU,NR  ANTHONY 17 1618 Done    Acceptance E NR  AR 17 1131 Active               Point: Body mechanics (Resolved)    Learning Progress Summary    Learner Readiness Method Response Comment Documented by Status   Patient Eager E,TB,D,H VU   17 1640 Done    Acceptance E,TB,D VU   17 1508 Done    Acceptance E VU,NR  ATNHONY 17 1618 Done    Acceptance E NR  AR 17 1131 Active               Point: Precautions (Resolved)    Learning Progress Summary    Learner Readiness Method Response Comment Documented by Status   Patient Eager E,TB,D,H VU   17 1640 Done    Acceptance E,TB,D VU   17 1508 Done    Acceptance E VU,NR  ANTHONY 17 1618 Done    Acceptance E NR  AR 17 1131 Active                       User Key     Initials Effective Dates Name Provider Type Discipline     02/18/16 -  Ying Wheat, PTA Physical Therapy Assistant PT    AR 06/27/16 -  Bessy Taveras, PT Physical Therapist PT    ANTHONY 04/24/15 -  Nicholas Mackay, PTA Physical Therapy Assistant PT                    IP PT Goals       02/23/17 1642 02/23/17 1641 02/23/17 1640    Bed Mobility PT LTG    Bed Mobility PT LTG, Outcome   goal met  -    Transfer Training PT LTG    Transfer Training PT LTG, Outcome  goal not met  -     Transfer Training PT LTG, Reason Goal Not Met  discharged from facility  -     Gait Training PT LTG    Gait Training Goal PT LTG, Outcome  goal not met  -     Gait Training Goal PT LTG, Reason Goal Not Met  discharged from facility  -     Range of Motion PT LTG    Range of Motion Goal PT LTG, Outcome goal not met  -        02/21/17 1130          Bed Mobility PT LTG    Bed Mobility PT LTG, Date Established 02/21/17  -AR      Bed Mobility PT LTG, Time to Achieve 1 wk  -AR      Bed Mobility PT LTG, Activity Type supine to sit/sit to supine  -AR      Bed Mobility PT LTG, [place] Level supervision required  -AR      Transfer Training PT LTG    Transfer Training PT LTG, Date Established 02/21/17  -AR      Transfer Training PT LTG, Time to Achieve 1 wk  -AR      Transfer Training PT LTG, Activity Type sit to stand/stand to sit;bed to chair /chair to bed  -AR      Transfer Training PT LTG, [place] Level supervision required  -AR      Transfer Training PT LTG, Assist Device walker, rolling  -AR      Gait Training PT LTG    Gait Training Goal PT LTG, Date Established 02/21/17  -AR      Gait Training Goal PT LTG, Time to Achieve 1 wk  -AR      Gait Training Goal PT LTG, [place] Level supervision required  -AR      Gait Training Goal PT LTG, Assist Device walker, rolling  -AR      Gait Training Goal PT LTG, Distance to Achieve 150  -AR      Range of Motion PT LTG    Range of Motion Goal PT LTG, Date Established  02/21/17  -AR      Range of Motion Goal PT LTG, Time to Achieve 1 wk  -AR      Range fo Motion Goal PT LTG, Joint R knee  -AR      Range of Motion Goal PT LTG, AROM Measure -5-90  -AR        User Key  (r) = Recorded By, (t) = Taken By, (c) = Cosigned By    Initials Name Provider Type    JOE Wheat PTA Physical Therapy Assistant    SHILA Taveras, CHRISSY Physical Therapist              Adult Rehabilitation Note       02/23/17 1614 02/22/17 1637 02/22/17 1459    Rehab Assessment/Intervention    Discipline physical therapy assistant  -JOE physical therapy assistant  -JOE physical therapy assistant  -JOE    Document Type discharge summary;therapy note (daily note)  -JOE therapy note (daily note)  -JOE therapy note (daily note)  -JOE    Subjective Information agree to therapy;complains of;fatigue;pain  - agree to therapy;complains of;fatigue;pain  - agree to therapy;complains of;fatigue;pain;swelling  -    Precautions/Limitations fall precautions  - fall precautions  - fall precautions  -    Recorded by [JOE] Ying Wheat PTA [JM] Ying Wheat PTA [JM] Ying Wheat PTA    Pain Assessment    Pain Assessment 0-10  -JM 0-10  -JM 0-10  -JM    Pain Score 7  -JM 6  -JM 6  -JM    Pain Type Surgical pain  -JM Surgical pain  -JM Surgical pain  -JM    Pain Location Knee  -JM Knee  -JM Knee  -JM    Pain Orientation Right  -JM Right  -JM Right  -JM    Pain Intervention(s) Medication (See MAR);Cold applied;Repositioned  -JM Medication (See MAR);Repositioned;Cold applied;Elevated  -JM Medication (See MAR);Repositioned;Cold applied  -JM    Response to Interventions peter  -JM  peter  -JM    Recorded by [JOE] Ying Wheat PTA [JM] Ying Wheat PTA [JM] Ying Wheat PTA    Cognitive Assessment/Intervention    Current Cognitive/Communication Assessment functional  -JM functional  -JM functional  -JM    Orientation Status oriented x 4  -JM oriented x 4  -JM oriented x 4  -JM    Recorded by [JOE] Ying  [place] Wheat [JM] Ying Wheat PTA [JM] Ying Wheat PTA    ROM (Range of Motion)    General ROM Detail -7-83  -JM  -6-78  -JM    Recorded by [JM] Ying Wheat PTA  [] Ying Wheat PTA    Bed Mobility, Assessment/Treatment    Bed Mob, Supine to Sit, [place] conditional independence  -JM not tested  -JM not tested  -JM    Bed Mob, Sit to Supine, [place] not tested  -JM not tested  -JM not tested  -JM    Recorded by [JM] Ying Wheat PTA [JM] Ying Wheat PTA [] Ying Wheat PTA    Transfer Assessment/Treatment    Transfers, Sit-Stand [place] contact guard assist;verbal cues required  -JM contact guard assist;verbal cues required  -JM contact guard assist;verbal cues required  -    Transfers, Stand-Sit [place] contact guard assist;verbal cues required  - contact guard assist;verbal cues required  -JM contact guard assist;verbal cues required  -    Transfers, Sit-Stand-Sit, Assist Device rolling walker  -JM rolling walker  -JM rolling walker  -JM    Transfer, Impairments strength decreased;impaired balance;pain  - strength decreased;impaired balance;pain  -JM strength decreased;impaired balance;pain  -JM    Recorded by [JM] Ying Wheat PTA [JM] Ying Wheat PTA [] Ying Wheat PTA    Gait Assessment/Treatment    Gait, [place] Level contact guard assist;verbal cues required  - contact guard assist;verbal cues required  - contact guard assist;verbal cues required  -    Gait, Assistive Device rolling walker  -JM rolling walker  -JM rolling walker  -JM    Gait, Distance (Feet) 90  -JM 80  -JM 50  -JM    Gait, Gait Deviations   tiara decreased;forward flexed posture;step length decreased;stride width increased  -    Gait, Impairments strength decreased;impaired balance  - strength decreased;impaired balance  - strength decreased;impaired balance  -    Recorded by [JM] Ying Wheat PTA [JM] Ying Wheat PTA [] Ying  [place] Wheat    Therapy Exercises    Exercise Protocols total knee  -JM total knee  -JM total knee  -JM    Total Knee Exercises right:;completed protocol;30 reps  -JM right:;completed protocol;25 reps  -JM right:;completed protocol;20 reps;with assist;SLR   to initiate  -JM    Recorded by [JOE] Ying Wheat PTA [JM] Ying Wheat PTA [JM] Ying Wheat PTA    Positioning and Restraints    Pre-Treatment Position sitting in chair/recliner  -JM sitting in chair/recliner  -JM sitting in chair/recliner  -JM    In Chair reclined;call light within reach;encouraged to call for assist;with nsg  -JM reclined;call light within reach;encouraged to call for assist;notified nsg  -JM reclined;call light within reach;encouraged to call for assist;notified nsg   nsg to assist to BR  -JM    Recorded by [JOE] Ying Wheat PTA [JM] Ying Wheat PTA [JM] Ying Wheat PTA      02/21/17 1512          Rehab Assessment/Intervention    Discipline physical therapy assistant  -ANTHONY      Document Type therapy note (daily note)  -ANTHONY      Subjective Information agree to therapy  -ANTHONY      Patient Effort, Rehab Treatment good  -ANTHONY      Precautions/Limitations fall precautions  -ANTHONY      Recorded by [ANTHONY] Nicholas Mackay PTA      Pain Assessment    Pain Assessment 0-10  -ANTHONY      Pain Score 8  -ANTHONY      Pain Type Acute pain;Surgical pain  -ANTHONY      Pain Location Knee  -ANTHONY      Pain Orientation Right  -ANTHONY      Pain Intervention(s) Ambulation/increased activity;Repositioned;Rest  -ANTHONY      Response to Interventions tolerated  -ANTHONY      Recorded by [ANTHONY] Nicholas Mackay PTA      Cognitive Assessment/Intervention    Current Cognitive/Communication Assessment functional  -ANTHONY      Orientation Status oriented x 4  -ANTHONY      Follows Commands/Answers Questions 100% of the time  -ANTHONY      Personal Safety WNL/WFL  -ANTHONY      Personal Safety Interventions fall prevention program maintained;gait belt;nonskid shoes/slippers when out of bed  -ANTHONY      Recorded by  [ANTHONY] Nicholas Mackay PTA      ROM (Range of Motion)    General ROM lower extremity range of motion deficits identified  -ANTHONY      Recorded by [ANTHONY] Nicholas Mackay PTA      General LE Assessment    ROM knee, right: LE ROM deficit  -ANTHONY      ROM Detail 21-70'  -ANTHONY      Recorded by [ANTHONY] Nicholas Mackay PTA      Bed Mobility, Assessment/Treatment    Bed Mob, Supine to Sit, [place] not tested  -ANTHONY      Bed Mob, Sit to Supine, [place] not tested  -ANTHONY      Recorded by [ANTHONY] Nicholas Mackay PTA      Transfer Assessment/Treatment    Transfers, Sit-Stand [place] contact guard assist;verbal cues required  -ANTHONY      Transfers, Stand-Sit [place] contact guard assist;verbal cues required  -ANTHONY      Transfers, Sit-Stand-Sit, Assist Device rolling walker  -ANTHONY      Transfer, Impairments strength decreased;impaired balance;pain  -ANTHONY      Recorded by [ANTHONY] Nicholas Mackay PTA      Gait Assessment/Treatment    Gait, [place] Level contact guard assist;verbal cues required  -ANTHONY      Gait, Assistive Device rolling walker  -ANTHONY      Gait, Distance (Feet) 35  -ANTHONY      Gait, Gait Deviations tiara decreased;decreased heel strike;forward flexed posture;antalgic  -ANTHONY      Gait, Safety Issues weight-shifting ability decreased;step length decreased  -ANTHONY      Gait, Impairments strength decreased;impaired balance  -ANTHONY      Recorded by [ANTHONY] Nicholas Mackay PTA      Therapy Exercises    Exercise Protocols total knee  -ANTHONY      Total Knee Exercises right:;15 reps;completed protocol  -ANTHONY      Recorded by [ANTHONY] Nicholas Mackay PTA      Positioning and Restraints    Pre-Treatment Position sitting in chair/recliner  -ANTHONY      Post Treatment Position chair  -ANTHONY      In Chair reclined;call light within reach;encouraged to call for assist  -ANTHONY      Recorded by [ANTHONY] Nicholas Mackay PTA        User Key  (r) = Recorded By, (t) = Taken By, (c) = Cosigned By    Initials Name Effective Dates    JOE Wheat, [place] 02/18/16 -     ANTHONY Mackay PTA 04/24/15 -            PT Recommendation and Plan  Anticipated Discharge Disposition: home with assist, home with home health  Planned Therapy Interventions: bed mobility training, balance training, gait training, home exercise program, patient/family education, ROM (Range of Motion), stair training, strengthening  PT Frequency: 2 times/day  Plan of Care Review  Plan Of Care Reviewed With: patient  Progress: improving  Outcome Summary/Follow up Plan: incr amb dist          Outcome Measures       02/23/17 1600 02/22/17 1400 02/21/17 1100    How much help from another person do you currently need...    Turning from your back to your side while in flat bed without using bedrails? 4  -JM 3  -JM 3  -AR    Moving from lying on back to sitting on the side of a flat bed without bedrails? 4  -JM 3  -JM 3  -AR    Moving to and from a bed to a chair (including a wheelchair)? 3  -JM 3  -JM 3  -AR    Standing up from a chair using your arms (e.g., wheelchair, bedside chair)? 3  -JM 3  -JM 3  -AR    Climbing 3-5 steps with a railing? 3  -JM 2  -JM 2  -AR    To walk in hospital room? 3  -JM 3  -JM 3  -AR    AM-PAC 6 Clicks Score 20  -JM 17  -JM 17  -AR    Functional Assessment    Outcome Measure Options   AM-PAC 6 Clicks Basic Mobility (PT)  -AR      User Key  (r) = Recorded By, (t) = Taken By, (c) = Cosigned By    Initials Name Provider Type    JOE Wheat PTA Physical Therapy Assistant    AR Bessy Taveras, PT Physical Therapist           Time Calculation:         PT Charges       02/23/17 1645          Time Calculation    Start Time 1033  -      Stop Time 1140  -      Time Calculation (min) 67 min  -      PT Received On 02/23/17  -        User Key  (r) = Recorded By, (t) = Taken By, (c) = Cosigned By    Initials Name Provider Type    JOE Wheat PTA Physical Therapy Assistant          Therapy Charges for Today     Code Description Service Date Service Provider Modifiers Qty    70444644583  PT THER PROC EA 15 MIN  2/22/2017 Ying Wheat, PTA GP 1    53122684650 HC PT THER PROC GROUP 2/22/2017 Ying Wheat, PTA GP 2    19334158857 HC PT THER PROC EA 15 MIN 2/22/2017 Ying Wheat, PTA GP 2    13529948838 HC PT THER PROC GROUP 2/22/2017 Ying Wheat, PTA GP 1    54369512454 HC PT THER PROC EA 15 MIN 2/23/2017 Ying Wheat, PTA GP 1    94246892337 HC PT THER PROC GROUP 2/23/2017 Ying Wheat, PTA GP 1          PT G-Codes  Outcome Measure Options: AM-PAC 6 Clicks Basic Mobility (PT)         Ying Wheat PTA  2/23/2017

## 2017-02-23 NOTE — PLAN OF CARE
Problem: Patient Care Overview (Adult)  Goal: Plan of Care Review  Outcome: Ongoing (interventions implemented as appropriate)    02/23/17 0236   Coping/Psychosocial Response Interventions   Plan Of Care Reviewed With patient   Patient Care Overview   Progress improving   Outcome Evaluation   Outcome Summary/Follow up Plan vss, n/v wnl, dsg c/d/i, reports adequate pain control, voiding, possible d/c today       Goal: Adult Individualization and Mutuality  Outcome: Ongoing (interventions implemented as appropriate)  Goal: Discharge Needs Assessment  Outcome: Ongoing (interventions implemented as appropriate)    Problem: Fall Risk (Adult)  Goal: Absence of Falls  Outcome: Ongoing (interventions implemented as appropriate)

## 2017-02-23 NOTE — PLAN OF CARE
Problem: Inpatient Physical Therapy  Goal: Bed Mobility Goal LTG- PT  Outcome: Outcome(s) achieved Date Met:  02/23/17 02/23/17 1640   Bed Mobility PT LTG   Bed Mobility PT LTG, Outcome goal met       Goal: Transfer Training Goal 1 LTG- PT  Outcome: Unable to achieve outcome(s) by discharge Date Met:  02/23/17 02/23/17 1641   Transfer Training PT LTG   Transfer Training PT LTG, Outcome goal not met   Transfer Training PT LTG, Reason Goal Not Met discharged from facility       Goal: Gait Training Goal LTG- PT  Outcome: Unable to achieve outcome(s) by discharge Date Met:  02/23/17 02/23/17 1641   Gait Training PT LTG   Gait Training Goal PT LTG, Outcome goal not met   Gait Training Goal PT LTG, Reason Goal Not Met discharged from facility       Goal: Range of Motion Goal LTG- PT  Outcome: Unable to achieve outcome(s) by discharge Date Met:  02/23/17 02/23/17 1642   Range of Motion PT LTG   Range of Motion Goal PT LTG, Outcome goal not met

## 2017-02-23 NOTE — PLAN OF CARE
Problem: Patient Care Overview (Adult)  Goal: Plan of Care Review  Outcome: Ongoing (interventions implemented as appropriate)    02/22/17 1509   Coping/Psychosocial Response Interventions   Plan Of Care Reviewed With patient   Patient Care Overview   Progress improving   Outcome Evaluation   Outcome Summary/Follow up Plan incr amb dist         02/22/17 1509   Coping/Psychosocial Response Interventions   Plan Of Care Reviewed With patient   Patient Care Overview   Progress improving   Outcome Evaluation   Outcome Summary/Follow up Plan incr amb dist

## 2017-02-23 NOTE — PROGRESS NOTES
Acute Care - Physical Therapy Treatment Note  [facility]     Patient Name: Katharine Mitchell  : 1960  MRN: [id number]  Today's Date: 2017  Onset of Illness/Injury or Date of Surgery Date: 17          Admit Date: 2017    Visit Dx:    ICD-10-CM ICD-9-CM   1. Primary osteoarthritis of right knee M17.11 715.16     Patient Active Problem List   Diagnosis   • Abnormal EKG   • Essential hypertension   • Preop cardiovascular exam   • Osteoarthritis of right knee               Adult Rehabilitation Note       17 1459 17 1512       Rehab Assessment/Intervention    Discipline physical therapy assistant  -JM physical therapy assistant  -ANTHONY     Document Type therapy note (daily note)  - therapy note (daily note)  -ANTHONY     Subjective Information agree to therapy;complains of;fatigue;pain;swelling  - agree to therapy  -ANTHONY     Patient Effort, Rehab Treatment  good  -ANTHONY     Precautions/Limitations fall precautions  - fall precautions  -ANTHONY     Recorded by [JOE] Ying Wheat PTA [ANTHONY] Nicholas Mackay PTA     Pain Assessment    Pain Assessment 0-10  -JM 0-10  -ANTHONY     Pain Score 6  -JM 8  -ANTHONY     Pain Type Surgical pain  - Acute pain;Surgical pain  -ANTHONY     Pain Location Knee  - Knee  -ANTHONY     Pain Orientation Right  - Right  -ANTHONY     Pain Intervention(s) Medication (See MAR);Repositioned;Cold applied  - Ambulation/increased activity;Repositioned;Rest  -ANTHONY     Response to Interventions peter  -JM tolerated  -ANTHONY     Recorded by [JOE] Ying Wheat PTA [ANTHONY] Nicholas Mackay PTA     Cognitive Assessment/Intervention    Current Cognitive/Communication Assessment functional  - functional  -ANTHONY     Orientation Status oriented x 4  -JM oriented x 4  -ANTHONY     Follows Commands/Answers Questions  100% of the time  -ANTHONY     Personal Safety  WNL/WFL  -ANTHONY     Personal Safety Interventions  fall prevention program maintained;gait belt;nonskid shoes/slippers when out of bed  -ANTHONY     Recorded by [JOE] Ying  [place] Wheat [ANTHONY] Nicholas Mackay PTA     ROM (Range of Motion)    General ROM  lower extremity range of motion deficits identified  -ANTHONY     General ROM Detail -6-78  -JM      Recorded by [JOE] Ying Wheat PTA [ANTHONY] Nicholas Mackay PTA     General LE Assessment    ROM  knee, right: LE ROM deficit  -ANTHONY     ROM Detail  21-70'  -ANTHONY     Recorded by  [ANTHONY] Nicholas Mackay PTA     Bed Mobility, Assessment/Treatment    Bed Mob, Supine to Sit, Cyril not tested  -JM not tested  -ANTHONY     Bed Mob, Sit to Supine, Cyril not tested  -JM not tested  -ANTHONY     Recorded by [JM] Ying Wheat PTA [ANTHONY] Nicholas Mackay PTA     Transfer Assessment/Treatment    Transfers, Sit-Stand Cyril contact guard assist;verbal cues required  -JM contact guard assist;verbal cues required  -ANTHONY     Transfers, Stand-Sit Cyril contact guard assist;verbal cues required  - contact guard assist;verbal cues required  -ANTHONY     Transfers, Sit-Stand-Sit, Assist Device rolling walker  -JM rolling walker  -ANTHONY     Transfer, Impairments strength decreased;impaired balance;pain  -JM strength decreased;impaired balance;pain  -ANTHONY     Recorded by [JM] Ying Wheat PTA [ANTHONY] Nicholas Mackay PTA     Gait Assessment/Treatment    Gait, Cyril Level contact guard assist;verbal cues required  - contact guard assist;verbal cues required  -ANTHONY     Gait, Assistive Device rolling walker  -JM rolling walker  -ANTHONY     Gait, Distance (Feet) 50  -JM 35  -ANTHONY     Gait, Gait Deviations tiara decreased;forward flexed posture;step length decreased;stride width increased  -JM tiara decreased;decreased heel strike;forward flexed posture;antalgic  -ANTHONY     Gait, Safety Issues  weight-shifting ability decreased;step length decreased  -ANTHONY     Gait, Impairments strength decreased;impaired balance  -JM strength decreased;impaired balance  -ANTHONY     Recorded by [JM] Ying Wheat PTA [ANTHONY] Nicholas Mackay PTA     Therapy Exercises    Exercise Protocols total knee  -  total knee  -ANTHONY     Total Knee Exercises right:;completed protocol;20 reps;with assist;SLR   to initiate  -JM right:;15 reps;completed protocol  -ANTHONY     Recorded by [JOE] Ying Wheat PTA [ANTHONY] Nicholas Mackay PTA     Positioning and Restraints    Pre-Treatment Position sitting in chair/recliner  -JM sitting in chair/recliner  -ANTHONY     Post Treatment Position  chair  -ANTHONY     In Chair reclined;call light within reach;encouraged to call for assist;notified nsg   nsg to assist to BR  -JM reclined;call light within reach;encouraged to call for assist  -ANTHONY     Recorded by [JOE] Ying Wheat PTA [ANTHONY] Nicholas Mackay PTA       User Key  (r) = Recorded By, (t) = Taken By, (c) = Cosigned By    Initials Name Effective Dates    JOE Wheat PTA 02/18/16 -     ANTHONY Nicholas Mackay PTA 04/24/15 -                 IP PT Goals       02/21/17 1130          Bed Mobility PT LTG    Bed Mobility PT LTG, Date Established 02/21/17  -AR      Bed Mobility PT LTG, Time to Achieve 1 wk  -AR      Bed Mobility PT LTG, Activity Type supine to sit/sit to supine  -AR      Bed Mobility PT LTG, [place] Level supervision required  -AR      Transfer Training PT LTG    Transfer Training PT LTG, Date Established 02/21/17  -AR      Transfer Training PT LTG, Time to Achieve 1 wk  -AR      Transfer Training PT LTG, Activity Type sit to stand/stand to sit;bed to chair /chair to bed  -AR      Transfer Training PT LTG, [place] Level supervision required  -AR      Transfer Training PT LTG, Assist Device walker, rolling  -AR      Gait Training PT LTG    Gait Training Goal PT LTG, Date Established 02/21/17  -AR      Gait Training Goal PT LTG, Time to Achieve 1 wk  -AR      Gait Training Goal PT LTG, [place] Level supervision required  -AR      Gait Training Goal PT LTG, Assist Device walker, rolling  -AR      Gait Training Goal PT LTG, Distance to Achieve 150  -AR      Range of Motion PT LTG    Range of Motion Goal PT LTG, Date Established  02/21/17  -AR      Range of Motion Goal PT LTG, Time to Achieve 1 wk  -AR      Range fo Motion Goal PT LTG, Joint R knee  -AR      Range of Motion Goal PT LTG, AROM Measure -5-90  -AR        User Key  (r) = Recorded By, (t) = Taken By, (c) = Cosigned By    Initials Name Provider Type    AR Bessy Taveras, PT Physical Therapist          Physical Therapy Education     Title: PT OT SLP Therapies (Done)     Topic: Physical Therapy (Done)     Point: Mobility training (Done)    Learning Progress Summary    Learner Readiness Method Response Comment Documented by Status   Patient Acceptance E,TB,D VU   02/22/17 1508 Done    Acceptance E VU,NR  ANTHONY 02/21/17 1618 Done    Acceptance E NR  AR 02/21/17 1131 Active               Point: Home exercise program (Done)    Learning Progress Summary    Learner Readiness Method Response Comment Documented by Status   Patient Acceptance E,TB,D VU   02/22/17 1508 Done    Acceptance E VU,NR  ANTHONY 02/21/17 1618 Done    Acceptance E NR  AR 02/21/17 1131 Active               Point: Body mechanics (Done)    Learning Progress Summary    Learner Readiness Method Response Comment Documented by Status   Patient Acceptance E,TB,D VU   02/22/17 1508 Done    Acceptance E VU,NR  ANTHONY 02/21/17 1618 Done    Acceptance E NR  AR 02/21/17 1131 Active               Point: Precautions (Done)    Learning Progress Summary    Learner Readiness Method Response Comment Documented by Status   Patient Acceptance E,TB,D VU   02/22/17 1508 Done    Acceptance E VU,NR  ANTHONY 02/21/17 1618 Done    Acceptance E NR  AR 02/21/17 1131 Active                      User Key     Initials Effective Dates Name Provider Type Discipline     02/18/16 -  Ying Wheat, PTA Physical Therapy Assistant PT    AR 06/27/16 -  Bessy Taveras, PT Physical Therapist PT    ANTHONY 04/24/15 -  Nicholas Mackay PTA Physical Therapy Assistant PT                    PT Recommendation and Plan  Anticipated Discharge Disposition: home with assist, home  with home health  Planned Therapy Interventions: bed mobility training, balance training, gait training, home exercise program, patient/family education, ROM (Range of Motion), stair training, strengthening  PT Frequency: 2 times/day  Plan of Care Review  Plan Of Care Reviewed With: patient  Progress: improving  Outcome Summary/Follow up Plan: incr amb dist          Outcome Measures       02/22/17 1400 02/21/17 1100       How much help from another person do you currently need...    Turning from your back to your side while in flat bed without using bedrails? 3  -JM 3  -AR     Moving from lying on back to sitting on the side of a flat bed without bedrails? 3  -JM 3  -AR     Moving to and from a bed to a chair (including a wheelchair)? 3  -JM 3  -AR     Standing up from a chair using your arms (e.g., wheelchair, bedside chair)? 3  -JM 3  -AR     Climbing 3-5 steps with a railing? 2  -JM 2  -AR     To walk in hospital room? 3  - 3  -AR     AM-PAC 6 Clicks Score 17  -JM 17  -AR     Functional Assessment    Outcome Measure Options  AM-PAC 6 Clicks Basic Mobility (PT)  -AR       User Key  (r) = Recorded By, (t) = Taken By, (c) = Cosigned By    Initials Name Provider Type    JOE Wheat PTA Physical Therapy Assistant    SHILA Taveras PT Physical Therapist           Time Calculation:       Therapy Charges for Today     Code Description Service Date Service Provider Modifiers Qty    23426349043 HC PT THER PROC EA 15 MIN 2/22/2017 Ying Wheat PTA GP 1    19051679648 HC PT THER PROC GROUP 2/22/2017 Ying Wheat PTA GP 2    43368023318 HC PT THER PROC EA 15 MIN 2/22/2017 Ying Wheat PTA GP 2    21210536603 HC PT THER PROC GROUP 2/22/2017 Ying Wheat PTA GP 1          PT G-Codes  Outcome Measure Options: AM-PAC 6 Clicks Basic Mobility (PT)    Ying Wheat PTA  2/23/2017

## 2017-02-23 NOTE — DISCHARGE SUMMARY
Discharge Summary    Date of Admission: 2/20/2017 10:16 AM  Date of Discharge:  2/23/2017    Discharge Diagnosis:   Osteoarthritis of right knee [M17.9]      PMHX:   Past Medical History   Diagnosis Date   • Arthritis    • Diabetes mellitus    • Hypertension    • Knee pain, right    • Sleep apnea      DOES NOT WEAR CPAP   • UTI (urinary tract infection)      TREATED FOR UTI JAN 2017       Discharge Disposition  Home-Health Care [facility] [place]    Procedures Performed  Procedure(s):  RT TOTAL KNEE ARTHROPLASTY       Indication for Admission  Patient is a 56 y.o. female admitted after undergoing the above surgical procedure. They were admitted for post-operative pain control, medical management and physical therapy.  They progressed with physical therapy.    They were deemed stable for discharge POD #3.      Consults:   Consults     No orders found from 1/22/2017 to 2/21/2017.          Discharge Instructions:  Patient is weight bearing as tolerated on the operative leg.  Patient is to progress range of motion and ambulation as tolerated.  Use walker as needed for stability and gait.  May progress to cane as tolerated.  The dressing should be left on knee until post-operative day 7, and then removed.  Dressing is waterproof. Patient may shower.  Use ace wrap as needed for swelling.  Patient will follow-up in the office in 2 weeks.  Call the office at [phone] for any questions or concerns.      Discharge Medications   Katharine Mitchell   Home Medication Instructions ALMA:[id number]    Printed on:02/23/17 0639   Medication Information                      aspirin (GOLD ASPIRIN) 325 MG tablet  Take 1 tablet by mouth Daily for 42 days.             Blood Glucose Monitoring Suppl (ONE TOUCH ULTRA 2) W/DEVICE kit               fexofenadine (ALLEGRA) 180 MG tablet  Take 180 mg by mouth Every Night.             fluticasone (FLONASE) 50 MCG/ACT nasal spray  2 sprays into each nostril Every Morning.             furosemide  (LASIX) 20 MG tablet  Take 20 mg by mouth As Needed (PRN SWELLING).             glucose blood (ONE TOUCH ULTRA TEST) test strip               ibuprofen (ADVIL,MOTRIN) 800 MG tablet  Take 800 mg by mouth Every 8 (Eight) Hours.             metFORMIN (GLUCOPHAGE) 500 MG tablet  Take 500 mg by mouth 2 (Two) Times a Day With Meals.             Multiple Vitamins-Minerals (MULTIVITAMIN ADULT PO)  Take 1 tablet by mouth Every Morning. TO STOP 1 WK VA SURG             NIFEdipine XL (PROCARDIA XL) 60 MG 24 hr tablet  Take 60 mg by mouth 2 (Two) Times a Day.             Omega-3 Fatty Acids (FISH OIL) 1000 MG capsule capsule  Take 1,000 mg by mouth 2 (Two) Times a Day With Meals. TO STOP 1 WK VA SURG             ONETOUCH DELICA LANCETS 33G misc               oxyCODONE-acetaminophen (PERCOCET)  MG per tablet  Take 1-2 tablets by mouth Every 4 (Four) Hours As Needed for moderate pain (4-6) for up to 7 days.             sennosides-docusate sodium (SENOKOT-S) 8.6-50 MG tablet  Take 2 tablets by mouth 2 (Two) Times a Day As Needed for constipation.             spironolactone (ALDACTONE) 25 MG tablet  Take 50 mg by mouth Every Morning.             spironolactone (ALDACTONE) 25 MG tablet  Take 25 mg by mouth Every Night.                 Discharge Diet:   Diet Instructions     Diet: Regular; Thin Liquids, No Restrictions       Discharge Diet:  Regular   Fluid Consistency:  Thin Liquids, No Restrictions                 Activity at Discharge:   Activity Instructions     Activity as Tolerated           Discharge Activity Restrictions       No driving until cleared by physician                 Follow-up Appointments  No future appointments.  Additional Instructions for the Follow-ups that You Need to Schedule     Referral to Home Health    As directed    Face to Face Visit Date:  2/23/2017   Follow-up Provider for Plan of Care?:  I will be treating the patient on an ongoing basis.  Please send me the Plan of Care for signature.    Follow-up Provider:  LAMONT MALLOY   Reason/Clinical Findings:  s/p TKA   Describe mobility limitations that make leaving home difficult:  taxing effort   Nursing/Therapeutic Services Requested:  Physical Therapy   PT orders:  Total joint pathway   Frequency:  1 Week 1                 Test Results Pending at Discharge   Order Current Status    POC Pregnancy, Urine In process           Lamont Malloy MD  02/23/17,  6:39 AM

## 2017-02-23 NOTE — PROGRESS NOTES
Acute Care - Physical Therapy Treatment Note  [facility]     Patient Name: Katharine Mitchell  : 1960  MRN: [id number]  Today's Date: 2017  Onset of Illness/Injury or Date of Surgery Date: 17          Admit Date: 2017    Visit Dx:    ICD-10-CM ICD-9-CM   1. Primary osteoarthritis of right knee M17.11 715.16     Patient Active Problem List   Diagnosis   • Abnormal EKG   • Essential hypertension   • Preop cardiovascular exam   • Osteoarthritis of right knee               Adult Rehabilitation Note       17 1637 17 1459 17 1512    Rehab Assessment/Intervention    Discipline physical therapy assistant  -JM physical therapy assistant  -JM physical therapy assistant  -ANTHONY    Document Type therapy note (daily note)  - therapy note (daily note)  - therapy note (daily note)  -ANTHONY    Subjective Information agree to therapy;complains of;fatigue;pain  - agree to therapy;complains of;fatigue;pain;swelling  - agree to therapy  -ANTHONY    Patient Effort, Rehab Treatment   good  -ANTHONY    Precautions/Limitations fall precautions  - fall precautions  - fall precautions  -ANTHONY    Recorded by [JM] Ying Wheat PTA [JM] Ying Wheat PTA [ANTHONY] Nicholas Mackay PTA    Pain Assessment    Pain Assessment 0-10  - 0-10  - 0-10  -ANTHONY    Pain Score 6  -JM 6  -JM 8  -ANTHONY    Pain Type Surgical pain  - Surgical pain  - Acute pain;Surgical pain  -ANTHONY    Pain Location Knee  -JM Knee  -JM Knee  -ANTHONY    Pain Orientation Right  -JM Right  -JM Right  -ANTHONY    Pain Intervention(s) Medication (See MAR);Repositioned;Cold applied;Elevated  - Medication (See MAR);Repositioned;Cold applied  - Ambulation/increased activity;Repositioned;Rest  -ANTHONY    Response to Interventions  peter  -JM tolerated  -ANTHONY    Recorded by [JM] Ying Wheat PTA [JM] Ying Wheat PTA [ANTHONY] Nicholas Mackay PTA    Cognitive Assessment/Intervention    Current Cognitive/Communication Assessment functional  - functional  -  functional  -ANTHONY    Orientation Status oriented x 4  -JM oriented x 4  -JM oriented x 4  -ANTHONY    Follows Commands/Answers Questions   100% of the time  -ANTHONY    Personal Safety   WNL/WFL  -ANTHONY    Personal Safety Interventions   fall prevention program maintained;gait belt;nonskid shoes/slippers when out of bed  -ANTHONY    Recorded by [JM] Ying Wheat PTA [JM] Ying Wheat PTA [ANTHONY] Nicholas Mackay PTA    ROM (Range of Motion)    General ROM   lower extremity range of motion deficits identified  -ANTHONY    General ROM Detail  -6-78  -JM     Recorded by  [JOE] Ying Wheat PTA [ANTHONY] Nicholas Mackay PTA    General LE Assessment    ROM   knee, right: LE ROM deficit  -ANTHONY    ROM Detail   21-70'  -ANTHONY    Recorded by   [ANTHONY] Nicholas Mackay PTA    Bed Mobility, Assessment/Treatment    Bed Mob, Supine to Sit, [place] not tested  -JM not tested  -JM not tested  -ANTHONY    Bed Mob, Sit to Supine, [place] not tested  -JM not tested  -JM not tested  -ANTHONY    Recorded by [JM] Ying Wheat PTA [JM] Ying Wheat PTA [ANTHONY] Nicholas Mackay PTA    Transfer Assessment/Treatment    Transfers, Sit-Stand [place] contact guard assist;verbal cues required  -JM contact guard assist;verbal cues required  -JM contact guard assist;verbal cues required  -ANTHONY    Transfers, Stand-Sit [place] contact guard assist;verbal cues required  -JM contact guard assist;verbal cues required  -JM contact guard assist;verbal cues required  -ANTHONY    Transfers, Sit-Stand-Sit, Assist Device rolling walker  -JM rolling walker  -JM rolling walker  -ANTHONY    Transfer, Impairments strength decreased;impaired balance;pain  -JM strength decreased;impaired balance;pain  -JM strength decreased;impaired balance;pain  -ANTHONY    Recorded by [JM] Ying Wheat PTA [JM] Ying Wheat PTA [ANTHONY] Nicholas Mackay PTA    Gait Assessment/Treatment    Gait, [place] Level contact guard assist;verbal cues required  -JM contact guard assist;verbal cues required  -JM contact guard  assist;verbal cues required  -ANTHONY    Gait, Assistive Device rolling walker  -JM rolling walker  -JM rolling walker  -ANTHONY    Gait, Distance (Feet) 80  -JM 50  -JM 35  -ANTHONY    Gait, Gait Deviations  tiara decreased;forward flexed posture;step length decreased;stride width increased  -JM tiara decreased;decreased heel strike;forward flexed posture;antalgic  -ANTHONY    Gait, Safety Issues   weight-shifting ability decreased;step length decreased  -ANTHONY    Gait, Impairments strength decreased;impaired balance  -JM strength decreased;impaired balance  -JM strength decreased;impaired balance  -ANTHONY    Recorded by [JOE] Ying Wheat PTA [JOE] Ying Wheat PTA [ANTHONY] Nicholas Mackay PTA    Therapy Exercises    Exercise Protocols total knee  -JM total knee  -JM total knee  -ANTHONY    Total Knee Exercises right:;completed protocol;25 reps  -JM right:;completed protocol;20 reps;with assist;SLR   to initiate  -JM right:;15 reps;completed protocol  -ANTHONY    Recorded by [JOE] Ying Wheat PTA [JM] Ying Wheat PTA [ANTHONY] Nicholas Mackay PTA    Positioning and Restraints    Pre-Treatment Position sitting in chair/recliner  -JM sitting in chair/recliner  -JM sitting in chair/recliner  -ANTHONY    Post Treatment Position   chair  -ANTHONY    In Chair reclined;call light within reach;encouraged to call for assist;notified nsg  -JM reclined;call light within reach;encouraged to call for assist;notified nsg   nsg to assist to BR  -JM reclined;call light within reach;encouraged to call for assist  -ANTHONY    Recorded by [JOE] Ying Wheat PTA [JM] Ying Wheat PTA [ANTHONY] Nicholas Mackay PTA      User Key  (r) = Recorded By, (t) = Taken By, (c) = Cosigned By    Initials Name Effective Dates    JOE Wheat PTA 02/18/16 -     ANTHONY Nicholas Mackay PTA 04/24/15 -                 IP PT Goals       02/21/17 1130          Bed Mobility PT LTG    Bed Mobility PT LTG, Date Established 02/21/17  -AR      Bed Mobility PT LTG, Time to Achieve 1 wk  -AR      Bed Mobility  PT LTG, Activity Type supine to sit/sit to supine  -AR      Bed Mobility PT LTG, [place] Level supervision required  -AR      Transfer Training PT LTG    Transfer Training PT LTG, Date Established 02/21/17  -AR      Transfer Training PT LTG, Time to Achieve 1 wk  -AR      Transfer Training PT LTG, Activity Type sit to stand/stand to sit;bed to chair /chair to bed  -AR      Transfer Training PT LTG, [place] Level supervision required  -AR      Transfer Training PT LTG, Assist Device walker, rolling  -AR      Gait Training PT LTG    Gait Training Goal PT LTG, Date Established 02/21/17  -AR      Gait Training Goal PT LTG, Time to Achieve 1 wk  -AR      Gait Training Goal PT LTG, [place] Level supervision required  -AR      Gait Training Goal PT LTG, Assist Device walker, rolling  -AR      Gait Training Goal PT LTG, Distance to Achieve 150  -AR      Range of Motion PT LTG    Range of Motion Goal PT LTG, Date Established 02/21/17  -AR      Range of Motion Goal PT LTG, Time to Achieve 1 wk  -AR      Range fo Motion Goal PT LTG, Joint R knee  -AR      Range of Motion Goal PT LTG, AROM Measure -5-90  -AR        User Key  (r) = Recorded By, (t) = Taken By, (c) = Cosigned By    Initials Name Provider Type    SHILA Taveras PT Physical Therapist          Physical Therapy Education     Title: PT OT SLP Therapies (Done)     Topic: Physical Therapy (Done)     Point: Mobility training (Done)    Learning Progress Summary    Learner Readiness Method Response Comment Documented by Status   Patient Acceptance PAOLA THOMPSON D VU   02/22/17 1508 Done    Acceptance E SOTO VANCE  ANTHONY 02/21/17 1618 Done    Acceptance E NR  AR 02/21/17 1131 Active               Point: Home exercise program (Done)    Learning Progress Summary    Learner Readiness Method Response Comment Documented by Status   Patient Acceptance PAOLA THOMPSON D VU   02/22/17 1508 Done    Acceptance E VU,NR  ANTHONY 02/21/17 1618 Done    Acceptance E NR  AR 02/21/17 1131 Active                Point: Body mechanics (Done)    Learning Progress Summary    Learner Readiness Method Response Comment Documented by Status   Patient Acceptance E,TB,D VU   02/22/17 1508 Done    Acceptance E VU,NR  ANTHONY 02/21/17 1618 Done    Acceptance E NR  AR 02/21/17 1131 Active               Point: Precautions (Done)    Learning Progress Summary    Learner Readiness Method Response Comment Documented by Status   Patient Acceptance E,TB,D VU   02/22/17 1508 Done    Acceptance E VU,NR  ANTHONY 02/21/17 1618 Done    Acceptance E NR  AR 02/21/17 1131 Active                      User Key     Initials Effective Dates Name Provider Type Discipline     02/18/16 -  Ying Wheat, PTA Physical Therapy Assistant PT    AR 06/27/16 -  Bessy Taveras, PT Physical Therapist PT    ANTHONY 04/24/15 -  Nicholas Mackay PTA Physical Therapy Assistant PT                    PT Recommendation and Plan  Anticipated Discharge Disposition: home with assist, home with home health  Planned Therapy Interventions: bed mobility training, balance training, gait training, home exercise program, patient/family education, ROM (Range of Motion), stair training, strengthening  PT Frequency: 2 times/day  Plan of Care Review  Plan Of Care Reviewed With: patient  Progress: improving  Outcome Summary/Follow up Plan: incr amb dist          Outcome Measures       02/22/17 1400 02/21/17 1100       How much help from another person do you currently need...    Turning from your back to your side while in flat bed without using bedrails? 3  -JM 3  -AR     Moving from lying on back to sitting on the side of a flat bed without bedrails? 3  -JM 3  -AR     Moving to and from a bed to a chair (including a wheelchair)? 3  -JM 3  -AR     Standing up from a chair using your arms (e.g., wheelchair, bedside chair)? 3  -JM 3  -AR     Climbing 3-5 steps with a railing? 2  -JM 2  -AR     To walk in hospital room? 3  -JM 3  -AR     AM-PAC 6 Clicks Score 17  -JM 17  -AR      Functional Assessment    Outcome Measure Options  AM-PAC 6 Clicks Basic Mobility (PT)  -AR       User Key  (r) = Recorded By, (t) = Taken By, (c) = Cosigned By    Initials Name Provider Type    JOE Wheat PTA Physical Therapy Assistant    SHILA Taveras PT Physical Therapist           Time Calculation:       Therapy Charges for Today     Code Description Service Date Service Provider Modifiers Qty    46434421725 HC PT THER PROC EA 15 MIN 2/22/2017 Ying Wheat PTA GP 1    23540735638 HC PT THER PROC GROUP 2/22/2017 Ying Wheat PTA GP 2    57299475895 HC PT THER PROC EA 15 MIN 2/22/2017 Ying Wheat, [place] GP 2    70882390060 HC PT THER PROC GROUP 2/22/2017 Ying Wheat, [place] GP 1          PT G-Codes  Outcome Measure Options: AM-PAC 6 Clicks Basic Mobility (PT)    Ying Wheat PTA  2/23/2017

## 2017-02-23 NOTE — PLAN OF CARE
Problem: Patient Care Overview (Adult)  Goal: Discharge Needs Assessment  Outcome: Ongoing (interventions implemented as appropriate)    Problem: Knee Replacement, Total (Adult)  Goal: Signs and Symptoms of Listed Potential Problems Will be Absent or Manageable (Knee Replacement, Total)  Outcome: Ongoing (interventions implemented as appropriate)    Problem: Fall Risk (Adult)  Goal: Absence of Falls  Outcome: Ongoing (interventions implemented as appropriate)

## 2018-03-13 ENCOUNTER — HOSPITAL ENCOUNTER (OUTPATIENT)
Dept: GENERAL RADIOLOGY | Facility: HOSPITAL | Age: 58
Discharge: HOME | End: 2018-03-13

## 2018-03-13 ENCOUNTER — HOSPITAL ENCOUNTER (OUTPATIENT)
Dept: GENERAL RADIOLOGY | Facility: HOSPITAL | Age: 58
Discharge: HOME | End: 2018-03-13
Admitting: ORTHOPAEDIC SURGERY

## 2018-03-13 ENCOUNTER — APPOINTMENT (OUTPATIENT)
Dept: PREADMISSION TESTING | Facility: HOSPITAL | Age: 58
End: 2018-03-13

## 2018-03-13 VITALS
DIASTOLIC BLOOD PRESSURE: 80 MMHG | BODY MASS INDEX: 51.89 KG/M2 | RESPIRATION RATE: 20 BRPM | TEMPERATURE: 97.8 F | OXYGEN SATURATION: 95 % | WEIGHT: 282 LBS | HEART RATE: 72 BPM | SYSTOLIC BLOOD PRESSURE: 143 MMHG | HEIGHT: 62 IN

## 2018-03-13 LAB
ANION GAP SERPL CALCULATED.3IONS-SCNC: 14.5 MMOL/L
BACTERIA UR QL AUTO: ABNORMAL /HPF
BILIRUB UR QL STRIP: NEGATIVE
BUN BLD-MCNC: 22 MG/DL (ref 6–20)
BUN/CREAT SERPL: 24.2 (ref 7–25)
CALCIUM SPEC-SCNC: 9.6 MG/DL (ref 8.6–10.5)
CHLORIDE SERPL-SCNC: 102 MMOL/L (ref 98–107)
CLARITY UR: CLEAR
CO2 SERPL-SCNC: 23.5 MMOL/L (ref 22–29)
COLOR UR: YELLOW
CREAT BLD-MCNC: 0.91 MG/DL (ref 0.57–1)
DEPRECATED RDW RBC AUTO: 41.5 FL (ref 37–54)
ERYTHROCYTE [DISTWIDTH] IN BLOOD BY AUTOMATED COUNT: 13.5 % (ref 11.7–13)
GFR SERPL CREATININE-BSD FRML MDRD: 77 ML/MIN/1.73
GLUCOSE BLD-MCNC: 128 MG/DL (ref 65–99)
GLUCOSE UR STRIP-MCNC: NEGATIVE MG/DL
HCT VFR BLD AUTO: 43.6 % (ref 35.6–45.5)
HGB BLD-MCNC: 15.2 G/DL (ref 11.9–15.5)
HGB UR QL STRIP.AUTO: NEGATIVE
HYALINE CASTS UR QL AUTO: ABNORMAL /LPF
KETONES UR QL STRIP: NEGATIVE
LEUKOCYTE ESTERASE UR QL STRIP.AUTO: ABNORMAL
MCH RBC QN AUTO: 29.3 PG (ref 26.9–32)
MCHC RBC AUTO-ENTMCNC: 34.9 G/DL (ref 32.4–36.3)
MCV RBC AUTO: 84.2 FL (ref 80.5–98.2)
NITRITE UR QL STRIP: NEGATIVE
PH UR STRIP.AUTO: 6 [PH] (ref 5–8)
PLATELET # BLD AUTO: 344 10*3/MM3 (ref 140–500)
PMV BLD AUTO: 10.1 FL (ref 6–12)
POTASSIUM BLD-SCNC: 4.4 MMOL/L (ref 3.5–5.2)
PROT UR QL STRIP: ABNORMAL
RBC # BLD AUTO: 5.18 10*6/MM3 (ref 3.9–5.2)
RBC # UR: ABNORMAL /HPF
REF LAB TEST METHOD: ABNORMAL
SODIUM BLD-SCNC: 140 MMOL/L (ref 136–145)
SP GR UR STRIP: 1.02 (ref 1–1.03)
SQUAMOUS #/AREA URNS HPF: ABNORMAL /HPF
UROBILINOGEN UR QL STRIP: ABNORMAL
WBC NRBC COR # BLD: 12.18 10*3/MM3 (ref 4.5–10.7)
WBC UR QL AUTO: ABNORMAL /HPF

## 2018-03-13 PROCEDURE — 80048 BASIC METABOLIC PNL TOTAL CA: CPT | Performed by: ORTHOPAEDIC SURGERY

## 2018-03-13 PROCEDURE — 73560 X-RAY EXAM OF KNEE 1 OR 2: CPT

## 2018-03-13 PROCEDURE — 71046 X-RAY EXAM CHEST 2 VIEWS: CPT

## 2018-03-13 PROCEDURE — 87086 URINE CULTURE/COLONY COUNT: CPT | Performed by: ORTHOPAEDIC SURGERY

## 2018-03-13 PROCEDURE — 36415 COLL VENOUS BLD VENIPUNCTURE: CPT

## 2018-03-13 PROCEDURE — 93010 ELECTROCARDIOGRAM REPORT: CPT | Performed by: INTERNAL MEDICINE

## 2018-03-13 PROCEDURE — 85027 COMPLETE CBC AUTOMATED: CPT | Performed by: ORTHOPAEDIC SURGERY

## 2018-03-13 PROCEDURE — 93005 ELECTROCARDIOGRAM TRACING: CPT

## 2018-03-13 PROCEDURE — 81001 URINALYSIS AUTO W/SCOPE: CPT | Performed by: ORTHOPAEDIC SURGERY

## 2018-03-13 RX ORDER — KETOTIFEN FUMARATE 0.35 MG/ML
1 SOLUTION/ DROPS OPHTHALMIC 2 TIMES DAILY
COMMUNITY
End: 2018-05-03

## 2018-03-13 RX ORDER — AZELASTINE 1 MG/ML
1 SPRAY, METERED NASAL EVERY EVENING
COMMUNITY
Start: 2017-12-22

## 2018-03-13 RX ORDER — SPIRONOLACTONE 25 MG/1
25 TABLET ORAL 2 TIMES DAILY
COMMUNITY
End: 2018-03-13

## 2018-03-13 RX ORDER — NIFEDIPINE 60 MG/1
60 TABLET, EXTENDED RELEASE ORAL 2 TIMES DAILY
COMMUNITY
End: 2021-02-15 | Stop reason: SDUPTHER

## 2018-03-13 RX ORDER — MELOXICAM 15 MG/1
15 TABLET ORAL DAILY
COMMUNITY
End: 2020-09-29

## 2018-03-13 ASSESSMENT — KOOS JR
KOOS JR SCORE: 39.625
KOOS JR SCORE: 19

## 2018-03-13 NOTE — DISCHARGE INSTRUCTIONS

## 2018-03-15 ENCOUNTER — TRANSCRIBE ORDERS (OUTPATIENT)
Dept: ADMINISTRATIVE | Facility: HOSPITAL | Age: 58
End: 2018-03-15

## 2018-03-15 ENCOUNTER — LAB (OUTPATIENT)
Dept: LAB | Facility: HOSPITAL | Age: 58
End: 2018-03-15
Attending: ORTHOPAEDIC SURGERY

## 2018-03-15 DIAGNOSIS — R19.8 ABNORMAL ABDOMINAL EXAM: Primary | ICD-10-CM

## 2018-03-15 DIAGNOSIS — R19.8 ABNORMAL ABDOMINAL EXAM: ICD-10-CM

## 2018-03-15 LAB
BACTERIA SPEC AEROBE CULT: NO GROWTH
BACTERIA UR QL AUTO: ABNORMAL /HPF
BASOPHILS # BLD AUTO: 0.06 10*3/MM3 (ref 0–0.2)
BASOPHILS NFR BLD AUTO: 0.5 % (ref 0–1.5)
BILIRUB UR QL STRIP: NEGATIVE
CLARITY UR: CLEAR
COLOR UR: YELLOW
DEPRECATED RDW RBC AUTO: 41.9 FL (ref 37–54)
EOSINOPHIL # BLD AUTO: 0.51 10*3/MM3 (ref 0–0.7)
EOSINOPHIL NFR BLD AUTO: 4 % (ref 0.3–6.2)
ERYTHROCYTE [DISTWIDTH] IN BLOOD BY AUTOMATED COUNT: 13.7 % (ref 11.7–13)
GLUCOSE UR STRIP-MCNC: NEGATIVE MG/DL
HCT VFR BLD AUTO: 44 % (ref 35.6–45.5)
HGB BLD-MCNC: 15.4 G/DL (ref 11.9–15.5)
HGB UR QL STRIP.AUTO: NEGATIVE
HYALINE CASTS UR QL AUTO: ABNORMAL /LPF
IMM GRANULOCYTES # BLD: 0.05 10*3/MM3 (ref 0–0.03)
IMM GRANULOCYTES NFR BLD: 0.4 % (ref 0–0.5)
KETONES UR QL STRIP: NEGATIVE
LEUKOCYTE ESTERASE UR QL STRIP.AUTO: ABNORMAL
LYMPHOCYTES # BLD AUTO: 3.75 10*3/MM3 (ref 0.9–4.8)
LYMPHOCYTES NFR BLD AUTO: 29.4 % (ref 19.6–45.3)
MCH RBC QN AUTO: 29.7 PG (ref 26.9–32)
MCHC RBC AUTO-ENTMCNC: 35 G/DL (ref 32.4–36.3)
MCV RBC AUTO: 84.8 FL (ref 80.5–98.2)
MONOCYTES # BLD AUTO: 0.86 10*3/MM3 (ref 0.2–1.2)
MONOCYTES NFR BLD AUTO: 6.7 % (ref 5–12)
NEUTROPHILS # BLD AUTO: 7.52 10*3/MM3 (ref 1.9–8.1)
NEUTROPHILS NFR BLD AUTO: 59 % (ref 42.7–76)
NITRITE UR QL STRIP: NEGATIVE
PH UR STRIP.AUTO: <=5 [PH] (ref 5–8)
PLATELET # BLD AUTO: 347 10*3/MM3 (ref 140–500)
PMV BLD AUTO: 9.8 FL (ref 6–12)
PROT UR QL STRIP: NEGATIVE
RBC # BLD AUTO: 5.19 10*6/MM3 (ref 3.9–5.2)
RBC # UR: ABNORMAL /HPF
REF LAB TEST METHOD: ABNORMAL
SP GR UR STRIP: 1.02 (ref 1–1.03)
SQUAMOUS #/AREA URNS HPF: ABNORMAL /HPF
UROBILINOGEN UR QL STRIP: ABNORMAL
WBC NRBC COR # BLD: 12.75 10*3/MM3 (ref 4.5–10.7)
WBC UR QL AUTO: ABNORMAL /HPF

## 2018-03-15 PROCEDURE — 87086 URINE CULTURE/COLONY COUNT: CPT

## 2018-03-15 PROCEDURE — 85025 COMPLETE CBC W/AUTO DIFF WBC: CPT

## 2018-03-15 PROCEDURE — 81001 URINALYSIS AUTO W/SCOPE: CPT

## 2018-03-15 PROCEDURE — 36415 COLL VENOUS BLD VENIPUNCTURE: CPT

## 2018-03-17 LAB — BACTERIA SPEC AEROBE CULT: NO GROWTH

## 2018-05-02 NOTE — PROGRESS NOTES
Pre-Operative Orthopedic Assessment        Patient: Katharine Mitchell    YOB: 1960      Age/Gender: 57 y.o. female  Medical Record Number: [id number]  Surgical Procedure Planned: LT TOTAL KNEE ARTHROPLASTY     Surgeon: Lamont Morales MD    Date of Surgery Planned: 03/19/2018     Question Value Score    Patient Score   1. What is your age group? 50-65 years  66-75 years  >75 years =2  =1  =0 2   2. Gender? Male  Female =2  =1 1   3. How far on average can you walk? (a block is 200 meters) Two blocks or more (+\- rest)  1-2 blocks (+\- rest)  Housebound (most of time) =2  =1  =0 1   4. Which gait aid to you use? (more often than not) None  Single-Point Stick  Crutches/Frame =2  =1  =0 2   5. Do you use community  supports? (home help, meals on wheels, district nursing) None or one per week  Two or more per week =1  =0 1   6. Will you live with someone who can care for you after your operation? Yes  No =3  =0 3       Your Score (out of 12)   10      Key Destination at Discharge from acute care predicted by score   Scores < 6  -- extended inpatient rehabilitation   Scores 6-9 -- additional intervention to discharge directly home (Rehabilitation in home)   Scores > 9 -- directly home            Discharge Disposition/Planning:    Patient Response   Discussed the Predicted discharge disposition needed based on RAPT Assessment with the patient.  yes   Patient selected discharge disposition: home   Out Patient Rehabilitation Facility of Choice:  none   Home Health Services Preferred: [facility]   Has the patient completed or been scheduled to complete pre-operative testing? yes   Post-Operative Care Giver Name and Phone Number:  Emergency contacts  Taqueria Pierson  [phone]  [place]  [phone]      Subacute Inpatient Rehabilitation:  Complete this section only if planning inpatient services at a Subacute Facility    Patient Response   Subacute Facility Preferred (Please list 2 facilities:      Requires pre-certification for inpatient rehabilitation services?          Planned source of transportation to inpatient rehabilitation facility?      If choosing inpatient services at an Acute or Subacute Facility please list a subsequent back-up plan (in case patient fails to qualify for inpatient rehabilitation). Back-up plans should include caregiver (family member or friend) for first 24-48 post- -operatively.      Home Equipment Patient Response   Does patient have a walker for home use?    yes   Does patient have a 3 in 1 commode for home use?    no   Does patient have a shower chair for home use?    Did not need   Does patient have an elevated commode seat for home use? yes   Does patient have a cane for home use?    yes   Is there any other medical equipment in the home? If so,  List in comment section below     Pre-Operative Class Attendance Patient Response   Attended or scheduled to attend the pre-operative class within 1 year of total joint replacement? yes   Not planning to attend the pre-operative class (see comments below)    attended   Patient Education  Completed   Expected time of discharge discussed yes   Encouraged to attend Pre-Operative Class    attended   Education re: Back-up plan for patients planning discharge to subacute facilities n/a   Education re: Predicted Discharge Disposition based on RAPT score    yes   Patient receptive and voiced understanding of information given    yes                                                                                                            Comments:    Address verified.  Patient had right knee replaced in February and used [facility] and family stayed with her and plan is the same.  Patient has 3 steps to enter the home and handrail is present.  No interior steps.                                                                                                                  Signature: Sana Hughes RN    Date:  3/6/2018

## 2018-05-03 ENCOUNTER — APPOINTMENT (OUTPATIENT)
Dept: PREADMISSION TESTING | Facility: HOSPITAL | Age: 58
End: 2018-05-03

## 2018-05-03 VITALS
SYSTOLIC BLOOD PRESSURE: 147 MMHG | HEART RATE: 73 BPM | RESPIRATION RATE: 20 BRPM | DIASTOLIC BLOOD PRESSURE: 87 MMHG | TEMPERATURE: 98 F | OXYGEN SATURATION: 97 % | WEIGHT: 284 LBS | BODY MASS INDEX: 52.26 KG/M2 | HEIGHT: 62 IN

## 2018-05-03 LAB
ANION GAP SERPL CALCULATED.3IONS-SCNC: 15.6 MMOL/L
BACTERIA UR QL AUTO: ABNORMAL /HPF
BILIRUB UR QL STRIP: NEGATIVE
BUN BLD-MCNC: 27 MG/DL (ref 6–20)
BUN/CREAT SERPL: 27 (ref 7–25)
CALCIUM SPEC-SCNC: 9 MG/DL (ref 8.6–10.5)
CHLORIDE SERPL-SCNC: 102 MMOL/L (ref 98–107)
CLARITY UR: CLEAR
CO2 SERPL-SCNC: 22.4 MMOL/L (ref 22–29)
COLOR UR: YELLOW
CREAT BLD-MCNC: 1 MG/DL (ref 0.57–1)
DEPRECATED RDW RBC AUTO: 43.4 FL (ref 37–54)
ERYTHROCYTE [DISTWIDTH] IN BLOOD BY AUTOMATED COUNT: 14.1 % (ref 11.7–13)
GFR SERPL CREATININE-BSD FRML MDRD: 69 ML/MIN/1.73
GLUCOSE BLD-MCNC: 156 MG/DL (ref 65–99)
GLUCOSE UR STRIP-MCNC: NEGATIVE MG/DL
HCT VFR BLD AUTO: 42 % (ref 35.6–45.5)
HGB BLD-MCNC: 14.4 G/DL (ref 11.9–15.5)
HGB UR QL STRIP.AUTO: NEGATIVE
HYALINE CASTS UR QL AUTO: ABNORMAL /LPF
KETONES UR QL STRIP: NEGATIVE
LEUKOCYTE ESTERASE UR QL STRIP.AUTO: ABNORMAL
MCH RBC QN AUTO: 29 PG (ref 26.9–32)
MCHC RBC AUTO-ENTMCNC: 34.3 G/DL (ref 32.4–36.3)
MCV RBC AUTO: 84.7 FL (ref 80.5–98.2)
NITRITE UR QL STRIP: NEGATIVE
PH UR STRIP.AUTO: <=5 [PH] (ref 5–8)
PLATELET # BLD AUTO: 308 10*3/MM3 (ref 140–500)
PMV BLD AUTO: 9.8 FL (ref 6–12)
POTASSIUM BLD-SCNC: 4.4 MMOL/L (ref 3.5–5.2)
PROT UR QL STRIP: ABNORMAL
RBC # BLD AUTO: 4.96 10*6/MM3 (ref 3.9–5.2)
RBC # UR: ABNORMAL /HPF
REF LAB TEST METHOD: ABNORMAL
SODIUM BLD-SCNC: 140 MMOL/L (ref 136–145)
SP GR UR STRIP: 1.03 (ref 1–1.03)
SQUAMOUS #/AREA URNS HPF: ABNORMAL /HPF
UROBILINOGEN UR QL STRIP: ABNORMAL
WBC NRBC COR # BLD: 9.79 10*3/MM3 (ref 4.5–10.7)
WBC UR QL AUTO: ABNORMAL /HPF

## 2018-05-03 PROCEDURE — 36415 COLL VENOUS BLD VENIPUNCTURE: CPT

## 2018-05-03 PROCEDURE — 85027 COMPLETE CBC AUTOMATED: CPT | Performed by: ORTHOPAEDIC SURGERY

## 2018-05-03 PROCEDURE — 81001 URINALYSIS AUTO W/SCOPE: CPT | Performed by: ORTHOPAEDIC SURGERY

## 2018-05-03 PROCEDURE — 87086 URINE CULTURE/COLONY COUNT: CPT | Performed by: ORTHOPAEDIC SURGERY

## 2018-05-03 PROCEDURE — 80048 BASIC METABOLIC PNL TOTAL CA: CPT | Performed by: ORTHOPAEDIC SURGERY

## 2018-05-03 RX ORDER — CHLORHEXIDINE GLUCONATE 500 MG/1
1 CLOTH TOPICAL
Status: ON HOLD | COMMUNITY
End: 2018-05-14

## 2018-05-03 RX ORDER — KETOTIFEN FUMARATE 0.35 MG/ML
1 SOLUTION/ DROPS OPHTHALMIC 2 TIMES DAILY
COMMUNITY
End: 2021-12-02

## 2018-05-03 ASSESSMENT — KOOS JR
KOOS JR SCORE: 39.625
KOOS JR SCORE: 19

## 2018-05-03 NOTE — DISCHARGE INSTRUCTIONS
Take the following medications the morning of surgery with a small sip of water:    nifedipine    General Instructions:  • Do not eat solid food after midnight the night before surgery.  • You may drink clear liquids day of surgery but must stop at least one hour before your hospital arrival time.  • It is beneficial for you to have a clear drink that contains carbohydrates the day of surgery.  We suggest a 12 to 20 ounce bottle of Gatorade or Powerade for non-diabetic patients or a 12 to 20 ounce bottle of G2 or Powerade Zero for diabetic patients. (Pediatric patients, are not advised to drink a 12 to 20 ounce carbohydrate drink)    Clear liquids are liquids you can see through.  Nothing red in color.     Plain water                               Sports drinks  Sodas                                   Gelatin (Jell-O)  Fruit juices without pulp such as white grape juice and apple juice  Popsicles that contain no fruit or yogurt  Tea or coffee (no cream or milk added)  Gatorade / Powerade  G2 / Powerade Zero    • Infants may have breast milk up to four hours before surgery.  • Infants drinking formula may drink formula up to six hours before surgery.   • Patients who avoid smoking, chewing tobacco and alcohol for 4 weeks prior to surgery have a reduced risk of post-operative complications.  Quit smoking as many days before surgery as you can.  • Do not smoke, use chewing tobacco or drink alcohol the day of surgery.   • If applicable bring your C-PAP/ BI-PAP machine.  • Bring any papers given to you in the doctor’s office.  • Wear clean comfortable clothes and socks.  • Do not wear contact lenses or make-up.  Bring a case for your glasses.   • Bring crutches or walker if applicable.  • Remove all piercings.  Leave jewelry and any other valuables at home.  • Hair extensions with metal clips must be removed prior to surgery.  • The Pre-Admission Testing nurse will instruct you to bring medications if unable to obtain an  accurate list in Pre-Admission Testing.        If you were given a blood bank ID arm band remember to bring it with you the day of surgery.    Preventing a Surgical Site Infection:  • For 2 to 3 days before surgery, avoid shaving with a razor because the razor can irritate skin and make it easier to develop an infection.  • The night prior to surgery sleep in a clean bed with clean clothing.  Do not allow pets to sleep with you.  • Shower on the morning of surgery using a fresh bar of anti-bacterial soap (such as Dial) and clean washcloth.  Dry with a clean towel and dress in clean clothing.  • Ask your surgeon if you will be receiving antibiotics prior to surgery.  • Make sure you, your family, and all healthcare providers clean their hands with soap and water or an alcohol based hand  before caring for you or your wound.    Day of surgery:5/14/18   0700  Upon arrival, a Pre-op nurse and Anesthesiologist will review your health history, obtain vital signs, and answer questions you may have.  The only belongings needed at this time will be your home medications and if applicable your C-PAP/BI-PAP machine.  If you are staying overnight your family can leave the rest of your belongings in the car and bring them to your room later.  A Pre-op nurse will start an IV and you may receive medication in preparation for surgery, including something to help you relax.  Your family will be able to see you in the Pre-op area.  While you are in surgery your family should notify the waiting room  if they leave the waiting room area and provide a contact phone number.    Please be aware that surgery does come with discomfort.  We want to make every effort to control your discomfort so please discuss any uncontrolled symptoms with your nurse.   Your doctor will most likely have prescribed pain medications.      If you are going home after surgery you will receive individualized written care instructions before  being discharged.  A responsible adult must drive you to and from the hospital on the day of your surgery and stay with you for 24 hours.    If you are staying overnight following surgery, you will be transported to your hospital room following the recovery period.  [facility] has all private rooms.    If you have any questions please call Pre-Admission Testing at [phone].  Deductibles and co-payments are collected on the day of service. Please be prepared to pay the required co-pay, deductible or deposit on the day of service as defined by your plan.    2% CHLORAHEXIDINE GLUCONATE* CLOTH  Preparing or “prepping” skin before surgery can reduce the risk of infection at the surgical site. To make the process easier, [facility] has chosen disposable cloths moistened with a rinse-free, 2% Chlorhexidine Gluconate (CHG) antiseptic solution. The steps below outline the prepping process and should be carefully followed.        Use the prep cloth on the area that is circled in the diagram             Directions Night before Surgery  1) Shower using a fresh bar of anti-bacterial soap (such as Dial) and clean washcloth.  Use a clean towel to completely dry your skin.  2) Do not use any lotions, oils or creams on your skin.  3) Open the package and remove 1 cloth, wipe your skin for 30 seconds in a circular motion.  Allow to dry for 3 minutes.  4) Repeat #3 with second cloth.  5) Do not touch your eyes, ears, or mouth with the prep cloth.  6) Allow the wet prep solution to air dry.  7) Discard the prep cloth and wash your hands with soap and water.   8) Dress in clean bed clothes and sleep on fresh clean bed sheets.   9) You may experience some temporary itching after the prep.    Directions Day of Surgery  1) Repeat steps 1,2,3,4,5,6,7, and 9.   2) Dress in clean clothes before coming to the hospital.    BACTROBAN NASAL OINTMENT  There are many germs normally in your nose. Bactroban is an  ointment that will help reduce these germs. Please follow these instructions for Bactroban use:      __1__The day before surgery in the morning  Date__5/13/18______    __2__The day before surgery in the evening              Date__5/13/18______    __3__The day of surgery in the morning    Date_5/14/18_______    **Squirt ½ package of Bactroban Ointment onto a cotton applicator and apply to inside of 1st nostril.  Squirt the remaining Bactroban and apply to the inside of the other nostril.    PERIDEX- ORAL:  Use only if your surgeon has ordered  Use the night before and morning of surgery - Swish, gargle, and spit - do not swallow.

## 2018-05-04 LAB — BACTERIA SPEC AEROBE CULT: NO GROWTH

## 2018-05-14 ENCOUNTER — ANESTHESIA (OUTPATIENT)
Dept: PERIOP | Facility: HOSPITAL | Age: 58
End: 2018-05-14

## 2018-05-14 ENCOUNTER — ANESTHESIA EVENT (OUTPATIENT)
Dept: PERIOP | Facility: HOSPITAL | Age: 58
End: 2018-05-14

## 2018-05-14 ENCOUNTER — APPOINTMENT (OUTPATIENT)
Dept: GENERAL RADIOLOGY | Facility: HOSPITAL | Age: 58
End: 2018-05-14

## 2018-05-14 ENCOUNTER — HOSPITAL ENCOUNTER (INPATIENT)
Facility: HOSPITAL | Age: 58
LOS: 2 days | Discharge: HOME HEALTH | End: 2018-05-16
Attending: ORTHOPAEDIC SURGERY | Admitting: ORTHOPAEDIC SURGERY

## 2018-05-14 DIAGNOSIS — R26.2 DIFFICULTY WALKING: Primary | ICD-10-CM

## 2018-05-14 DIAGNOSIS — M17.12 PRIMARY OSTEOARTHRITIS OF LEFT KNEE: ICD-10-CM

## 2018-05-14 LAB
GLUCOSE BLDC GLUCOMTR-MCNC: 154 MG/DL (ref 70–130)
GLUCOSE BLDC GLUCOMTR-MCNC: 182 MG/DL (ref 70–130)
GLUCOSE BLDC GLUCOMTR-MCNC: 194 MG/DL (ref 70–130)
GLUCOSE BLDC GLUCOMTR-MCNC: 245 MG/DL (ref 70–130)
HBA1C MFR BLD: 6.67 % (ref 4.8–5.6)
HCT VFR BLD AUTO: 39.4 % (ref 35.6–45.5)
HGB BLD-MCNC: 13.9 G/DL (ref 11.9–15.5)

## 2018-05-14 PROCEDURE — 25010000002 HYDROMORPHONE PER 4 MG: Performed by: NURSE ANESTHETIST, CERTIFIED REGISTERED

## 2018-05-14 PROCEDURE — 73560 X-RAY EXAM OF KNEE 1 OR 2: CPT

## 2018-05-14 PROCEDURE — C1776 JOINT DEVICE (IMPLANTABLE): HCPCS | Performed by: ORTHOPAEDIC SURGERY

## 2018-05-14 PROCEDURE — 25010000002 HYDROMORPHONE PER 4 MG: Performed by: ORTHOPAEDIC SURGERY

## 2018-05-14 PROCEDURE — 83036 HEMOGLOBIN GLYCOSYLATED A1C: CPT | Performed by: ORTHOPAEDIC SURGERY

## 2018-05-14 PROCEDURE — 25010000002 FENTANYL CITRATE (PF) 100 MCG/2ML SOLUTION: Performed by: NURSE ANESTHETIST, CERTIFIED REGISTERED

## 2018-05-14 PROCEDURE — 25010000002 NEOSTIGMINE PER 0.5 MG: Performed by: NURSE ANESTHETIST, CERTIFIED REGISTERED

## 2018-05-14 PROCEDURE — 25010000002 ONDANSETRON PER 1 MG: Performed by: NURSE ANESTHETIST, CERTIFIED REGISTERED

## 2018-05-14 PROCEDURE — 85018 HEMOGLOBIN: CPT | Performed by: ORTHOPAEDIC SURGERY

## 2018-05-14 PROCEDURE — 63710000001 INSULIN ASPART PER 5 UNITS: Performed by: ORTHOPAEDIC SURGERY

## 2018-05-14 PROCEDURE — 97110 THERAPEUTIC EXERCISES: CPT

## 2018-05-14 PROCEDURE — 94799 UNLISTED PULMONARY SVC/PX: CPT

## 2018-05-14 PROCEDURE — C1713 ANCHOR/SCREW BN/BN,TIS/BN: HCPCS | Performed by: ORTHOPAEDIC SURGERY

## 2018-05-14 PROCEDURE — 97161 PT EVAL LOW COMPLEX 20 MIN: CPT

## 2018-05-14 PROCEDURE — 25010000002 MIDAZOLAM PER 1 MG: Performed by: ANESTHESIOLOGY

## 2018-05-14 PROCEDURE — 25010000003 CEFAZOLIN IN DEXTROSE 2-4 GM/100ML-% SOLUTION: Performed by: ORTHOPAEDIC SURGERY

## 2018-05-14 PROCEDURE — 25010000002 SUCCINYLCHOLINE PER 20 MG: Performed by: NURSE ANESTHETIST, CERTIFIED REGISTERED

## 2018-05-14 PROCEDURE — 82962 GLUCOSE BLOOD TEST: CPT

## 2018-05-14 PROCEDURE — 25010000002 HYDRALAZINE PER 20 MG: Performed by: NURSE ANESTHETIST, CERTIFIED REGISTERED

## 2018-05-14 PROCEDURE — 25010000002 PROPOFOL 10 MG/ML EMULSION: Performed by: NURSE ANESTHETIST, CERTIFIED REGISTERED

## 2018-05-14 PROCEDURE — 85014 HEMATOCRIT: CPT | Performed by: ORTHOPAEDIC SURGERY

## 2018-05-14 PROCEDURE — 25010000002 ONDANSETRON PER 1 MG: Performed by: ORTHOPAEDIC SURGERY

## 2018-05-14 PROCEDURE — 25010000002 VANCOMYCIN 10 G RECONSTITUTED SOLUTION: Performed by: ORTHOPAEDIC SURGERY

## 2018-05-14 PROCEDURE — 0SRD0J9 REPLACEMENT OF LEFT KNEE JOINT WITH SYNTHETIC SUBSTITUTE, CEMENTED, OPEN APPROACH: ICD-10-PCS | Performed by: ORTHOPAEDIC SURGERY

## 2018-05-14 DEVICE — COMP FEM/KN PERSONA CR CMT COCR STD SZ8 LT: Type: IMPLANTABLE DEVICE | Site: KNEE | Status: FUNCTIONAL

## 2018-05-14 DEVICE — CAP PAT KN VE/TM UPCHRG: Type: IMPLANTABLE DEVICE | Site: KNEE | Status: FUNCTIONAL

## 2018-05-14 DEVICE — CAP BEAR KN VE UPCHRG: Type: IMPLANTABLE DEVICE | Site: KNEE | Status: FUNCTIONAL

## 2018-05-14 DEVICE — CAP TOTL KN CMT PREMIUM: Type: IMPLANTABLE DEVICE | Site: KNEE | Status: FUNCTIONAL

## 2018-05-14 DEVICE — PAT KN PERSONA VE CRS/LNK CMT 8.5X32MM: Type: IMPLANTABLE DEVICE | Site: KNEE | Status: FUNCTIONAL

## 2018-05-14 DEVICE — CMT BONE PALACOS R HI/VISC 1X40: Type: IMPLANTABLE DEVICE | Site: KNEE | Status: FUNCTIONAL

## 2018-05-14 DEVICE — STEM TIB/KN PERSONA CMT 5D SZE LT: Type: IMPLANTABLE DEVICE | Site: KNEE | Status: FUNCTIONAL

## 2018-05-14 DEVICE — ART/SRF KN PERSONA/VE PS EF 8TO11 10MM LT: Type: IMPLANTABLE DEVICE | Site: KNEE | Status: FUNCTIONAL

## 2018-05-14 RX ORDER — SODIUM CHLORIDE 0.9 % (FLUSH) 0.9 %
1-10 SYRINGE (ML) INJECTION AS NEEDED
Status: DISCONTINUED | OUTPATIENT
Start: 2018-05-14 | End: 2018-05-14 | Stop reason: HOSPADM

## 2018-05-14 RX ORDER — NIFEDIPINE 60 MG/1
60 TABLET, EXTENDED RELEASE ORAL EVERY 12 HOURS SCHEDULED
Status: DISCONTINUED | OUTPATIENT
Start: 2018-05-14 | End: 2018-05-16 | Stop reason: HOSPADM

## 2018-05-14 RX ORDER — OXYCODONE AND ACETAMINOPHEN 10; 325 MG/1; MG/1
1 TABLET ORAL
Status: DISCONTINUED | OUTPATIENT
Start: 2018-05-14 | End: 2018-05-16 | Stop reason: HOSPADM

## 2018-05-14 RX ORDER — SENNA AND DOCUSATE SODIUM 50; 8.6 MG/1; MG/1
2 TABLET, FILM COATED ORAL 2 TIMES DAILY
Status: DISCONTINUED | OUTPATIENT
Start: 2018-05-14 | End: 2018-05-16 | Stop reason: HOSPADM

## 2018-05-14 RX ORDER — LABETALOL HYDROCHLORIDE 5 MG/ML
5 INJECTION, SOLUTION INTRAVENOUS
Status: DISCONTINUED | OUTPATIENT
Start: 2018-05-14 | End: 2018-05-14 | Stop reason: HOSPADM

## 2018-05-14 RX ORDER — KETOTIFEN FUMARATE 0.35 MG/ML
1 SOLUTION/ DROPS OPHTHALMIC 2 TIMES DAILY
Status: DISCONTINUED | OUTPATIENT
Start: 2018-05-14 | End: 2018-05-16 | Stop reason: HOSPADM

## 2018-05-14 RX ORDER — PROMETHAZINE HYDROCHLORIDE 25 MG/1
25 SUPPOSITORY RECTAL ONCE AS NEEDED
Status: DISCONTINUED | OUTPATIENT
Start: 2018-05-14 | End: 2018-05-14 | Stop reason: HOSPADM

## 2018-05-14 RX ORDER — ONDANSETRON 2 MG/ML
INJECTION INTRAMUSCULAR; INTRAVENOUS AS NEEDED
Status: DISCONTINUED | OUTPATIENT
Start: 2018-05-14 | End: 2018-05-14 | Stop reason: SURG

## 2018-05-14 RX ORDER — ONDANSETRON 4 MG/1
4 TABLET, FILM COATED ORAL EVERY 6 HOURS PRN
Status: DISCONTINUED | OUTPATIENT
Start: 2018-05-14 | End: 2018-05-16 | Stop reason: HOSPADM

## 2018-05-14 RX ORDER — MIDAZOLAM HYDROCHLORIDE 1 MG/ML
2 INJECTION INTRAMUSCULAR; INTRAVENOUS
Status: DISCONTINUED | OUTPATIENT
Start: 2018-05-14 | End: 2018-05-14 | Stop reason: HOSPADM

## 2018-05-14 RX ORDER — LIDOCAINE HYDROCHLORIDE 10 MG/ML
0.5 INJECTION, SOLUTION EPIDURAL; INFILTRATION; INTRACAUDAL; PERINEURAL ONCE AS NEEDED
Status: DISCONTINUED | OUTPATIENT
Start: 2018-05-14 | End: 2018-05-14 | Stop reason: HOSPADM

## 2018-05-14 RX ORDER — HYDRALAZINE HYDROCHLORIDE 20 MG/ML
5 INJECTION INTRAMUSCULAR; INTRAVENOUS
Status: DISCONTINUED | OUTPATIENT
Start: 2018-05-14 | End: 2018-05-14 | Stop reason: HOSPADM

## 2018-05-14 RX ORDER — SODIUM CHLORIDE 0.9 % (FLUSH) 0.9 %
1-10 SYRINGE (ML) INJECTION AS NEEDED
Status: DISCONTINUED | OUTPATIENT
Start: 2018-05-14 | End: 2018-05-16 | Stop reason: HOSPADM

## 2018-05-14 RX ORDER — MAGNESIUM HYDROXIDE 1200 MG/15ML
LIQUID ORAL AS NEEDED
Status: DISCONTINUED | OUTPATIENT
Start: 2018-05-14 | End: 2018-05-14 | Stop reason: HOSPADM

## 2018-05-14 RX ORDER — FLUTICASONE PROPIONATE 50 MCG
2 SPRAY, SUSPENSION (ML) NASAL EVERY MORNING
Status: DISCONTINUED | OUTPATIENT
Start: 2018-05-14 | End: 2018-05-16 | Stop reason: HOSPADM

## 2018-05-14 RX ORDER — FENTANYL CITRATE 50 UG/ML
INJECTION, SOLUTION INTRAMUSCULAR; INTRAVENOUS AS NEEDED
Status: DISCONTINUED | OUTPATIENT
Start: 2018-05-14 | End: 2018-05-14 | Stop reason: SURG

## 2018-05-14 RX ORDER — FLUMAZENIL 0.1 MG/ML
0.2 INJECTION INTRAVENOUS AS NEEDED
Status: DISCONTINUED | OUTPATIENT
Start: 2018-05-14 | End: 2018-05-14 | Stop reason: HOSPADM

## 2018-05-14 RX ORDER — OXYCODONE HYDROCHLORIDE 5 MG/1
20 TABLET ORAL EVERY 4 HOURS PRN
Status: DISCONTINUED | OUTPATIENT
Start: 2018-05-14 | End: 2018-05-16 | Stop reason: HOSPADM

## 2018-05-14 RX ORDER — OXYCODONE AND ACETAMINOPHEN 7.5; 325 MG/1; MG/1
1 TABLET ORAL ONCE AS NEEDED
Status: DISCONTINUED | OUTPATIENT
Start: 2018-05-14 | End: 2018-05-14 | Stop reason: HOSPADM

## 2018-05-14 RX ORDER — SODIUM CHLORIDE, SODIUM LACTATE, POTASSIUM CHLORIDE, CALCIUM CHLORIDE 600; 310; 30; 20 MG/100ML; MG/100ML; MG/100ML; MG/100ML
100 INJECTION, SOLUTION INTRAVENOUS CONTINUOUS
Status: DISCONTINUED | OUTPATIENT
Start: 2018-05-14 | End: 2018-05-15

## 2018-05-14 RX ORDER — HYDRALAZINE HYDROCHLORIDE 20 MG/ML
10 INJECTION INTRAMUSCULAR; INTRAVENOUS ONCE
Status: DISCONTINUED | OUTPATIENT
Start: 2018-05-14 | End: 2018-05-16 | Stop reason: HOSPADM

## 2018-05-14 RX ORDER — EPHEDRINE SULFATE 50 MG/ML
5 INJECTION, SOLUTION INTRAVENOUS ONCE AS NEEDED
Status: DISCONTINUED | OUTPATIENT
Start: 2018-05-14 | End: 2018-05-14 | Stop reason: HOSPADM

## 2018-05-14 RX ORDER — HYDROMORPHONE HYDROCHLORIDE 1 MG/ML
0.5 INJECTION, SOLUTION INTRAMUSCULAR; INTRAVENOUS; SUBCUTANEOUS
Status: DISCONTINUED | OUTPATIENT
Start: 2018-05-14 | End: 2018-05-16 | Stop reason: HOSPADM

## 2018-05-14 RX ORDER — FENTANYL CITRATE 50 UG/ML
50 INJECTION, SOLUTION INTRAMUSCULAR; INTRAVENOUS
Status: DISCONTINUED | OUTPATIENT
Start: 2018-05-14 | End: 2018-05-14 | Stop reason: HOSPADM

## 2018-05-14 RX ORDER — BISACODYL 10 MG
10 SUPPOSITORY, RECTAL RECTAL DAILY PRN
Status: DISCONTINUED | OUTPATIENT
Start: 2018-05-14 | End: 2018-05-16 | Stop reason: HOSPADM

## 2018-05-14 RX ORDER — CEFAZOLIN SODIUM IN 0.9 % NACL 3 G/100 ML
3 INTRAVENOUS SOLUTION, PIGGYBACK (ML) INTRAVENOUS ONCE
Status: COMPLETED | OUTPATIENT
Start: 2018-05-14 | End: 2018-05-14

## 2018-05-14 RX ORDER — SODIUM CHLORIDE, SODIUM LACTATE, POTASSIUM CHLORIDE, CALCIUM CHLORIDE 600; 310; 30; 20 MG/100ML; MG/100ML; MG/100ML; MG/100ML
9 INJECTION, SOLUTION INTRAVENOUS CONTINUOUS
Status: DISCONTINUED | OUTPATIENT
Start: 2018-05-14 | End: 2018-05-16 | Stop reason: HOSPADM

## 2018-05-14 RX ORDER — DIPHENHYDRAMINE HYDROCHLORIDE 50 MG/ML
12.5 INJECTION INTRAMUSCULAR; INTRAVENOUS
Status: DISCONTINUED | OUTPATIENT
Start: 2018-05-14 | End: 2018-05-14 | Stop reason: HOSPADM

## 2018-05-14 RX ORDER — PROPOFOL 10 MG/ML
VIAL (ML) INTRAVENOUS AS NEEDED
Status: DISCONTINUED | OUTPATIENT
Start: 2018-05-14 | End: 2018-05-14 | Stop reason: SURG

## 2018-05-14 RX ORDER — NALOXONE HCL 0.4 MG/ML
0.2 VIAL (ML) INJECTION AS NEEDED
Status: DISCONTINUED | OUTPATIENT
Start: 2018-05-14 | End: 2018-05-14 | Stop reason: HOSPADM

## 2018-05-14 RX ORDER — SUCCINYLCHOLINE CHLORIDE 20 MG/ML
INJECTION INTRAMUSCULAR; INTRAVENOUS AS NEEDED
Status: DISCONTINUED | OUTPATIENT
Start: 2018-05-14 | End: 2018-05-14 | Stop reason: SURG

## 2018-05-14 RX ORDER — PROMETHAZINE HYDROCHLORIDE 25 MG/1
12.5 TABLET ORAL ONCE AS NEEDED
Status: DISCONTINUED | OUTPATIENT
Start: 2018-05-14 | End: 2018-05-14 | Stop reason: HOSPADM

## 2018-05-14 RX ORDER — AZELASTINE 1 MG/ML
1 SPRAY, METERED NASAL EVERY EVENING
Status: DISCONTINUED | OUTPATIENT
Start: 2018-05-14 | End: 2018-05-16 | Stop reason: HOSPADM

## 2018-05-14 RX ORDER — ONDANSETRON 2 MG/ML
4 INJECTION INTRAMUSCULAR; INTRAVENOUS ONCE AS NEEDED
Status: DISCONTINUED | OUTPATIENT
Start: 2018-05-14 | End: 2018-05-14 | Stop reason: HOSPADM

## 2018-05-14 RX ORDER — ONDANSETRON 4 MG/1
4 TABLET, ORALLY DISINTEGRATING ORAL EVERY 6 HOURS PRN
Status: DISCONTINUED | OUTPATIENT
Start: 2018-05-14 | End: 2018-05-16 | Stop reason: HOSPADM

## 2018-05-14 RX ORDER — LIDOCAINE HYDROCHLORIDE 20 MG/ML
INJECTION, SOLUTION INFILTRATION; PERINEURAL AS NEEDED
Status: DISCONTINUED | OUTPATIENT
Start: 2018-05-14 | End: 2018-05-14 | Stop reason: SURG

## 2018-05-14 RX ORDER — DIAZEPAM 5 MG/1
5 TABLET ORAL EVERY 6 HOURS PRN
Status: DISCONTINUED | OUTPATIENT
Start: 2018-05-14 | End: 2018-05-16 | Stop reason: HOSPADM

## 2018-05-14 RX ORDER — DEXTROSE MONOHYDRATE 25 G/50ML
25 INJECTION, SOLUTION INTRAVENOUS
Status: DISCONTINUED | OUTPATIENT
Start: 2018-05-14 | End: 2018-05-16 | Stop reason: HOSPADM

## 2018-05-14 RX ORDER — HYDROCODONE BITARTRATE AND ACETAMINOPHEN 7.5; 325 MG/1; MG/1
1 TABLET ORAL ONCE AS NEEDED
Status: DISCONTINUED | OUTPATIENT
Start: 2018-05-14 | End: 2018-05-14 | Stop reason: HOSPADM

## 2018-05-14 RX ORDER — ACETAMINOPHEN 325 MG/1
325 TABLET ORAL EVERY 4 HOURS PRN
Status: DISCONTINUED | OUTPATIENT
Start: 2018-05-14 | End: 2018-05-16 | Stop reason: HOSPADM

## 2018-05-14 RX ORDER — TRANEXAMIC ACID 100 MG/ML
INJECTION, SOLUTION INTRAVENOUS AS NEEDED
Status: DISCONTINUED | OUTPATIENT
Start: 2018-05-14 | End: 2018-05-14 | Stop reason: SURG

## 2018-05-14 RX ORDER — MIDAZOLAM HYDROCHLORIDE 1 MG/ML
1 INJECTION INTRAMUSCULAR; INTRAVENOUS
Status: DISCONTINUED | OUTPATIENT
Start: 2018-05-14 | End: 2018-05-14 | Stop reason: HOSPADM

## 2018-05-14 RX ORDER — HYDROMORPHONE HCL 110MG/55ML
PATIENT CONTROLLED ANALGESIA SYRINGE INTRAVENOUS AS NEEDED
Status: DISCONTINUED | OUTPATIENT
Start: 2018-05-14 | End: 2018-05-14 | Stop reason: SURG

## 2018-05-14 RX ORDER — FUROSEMIDE 20 MG/1
20 TABLET ORAL AS NEEDED
Status: DISCONTINUED | OUTPATIENT
Start: 2018-05-14 | End: 2018-05-16 | Stop reason: HOSPADM

## 2018-05-14 RX ORDER — CEFAZOLIN SODIUM 2 G/100ML
2 INJECTION, SOLUTION INTRAVENOUS EVERY 8 HOURS
Status: COMPLETED | OUTPATIENT
Start: 2018-05-14 | End: 2018-05-14

## 2018-05-14 RX ORDER — FAMOTIDINE 10 MG/ML
20 INJECTION, SOLUTION INTRAVENOUS ONCE
Status: COMPLETED | OUTPATIENT
Start: 2018-05-14 | End: 2018-05-14

## 2018-05-14 RX ORDER — MULTIPLE VITAMINS W/ MINERALS TAB 9MG-400MCG
1 TAB ORAL EVERY MORNING
Status: DISCONTINUED | OUTPATIENT
Start: 2018-05-14 | End: 2018-05-16 | Stop reason: HOSPADM

## 2018-05-14 RX ORDER — MELOXICAM 15 MG/1
15 TABLET ORAL DAILY
Status: DISCONTINUED | OUTPATIENT
Start: 2018-05-14 | End: 2018-05-16 | Stop reason: HOSPADM

## 2018-05-14 RX ORDER — ONDANSETRON 2 MG/ML
4 INJECTION INTRAMUSCULAR; INTRAVENOUS EVERY 6 HOURS PRN
Status: DISCONTINUED | OUTPATIENT
Start: 2018-05-14 | End: 2018-05-16 | Stop reason: HOSPADM

## 2018-05-14 RX ORDER — HYDROMORPHONE HCL 110MG/55ML
0.5 PATIENT CONTROLLED ANALGESIA SYRINGE INTRAVENOUS
Status: DISCONTINUED | OUTPATIENT
Start: 2018-05-14 | End: 2018-05-14 | Stop reason: HOSPADM

## 2018-05-14 RX ORDER — NICOTINE POLACRILEX 4 MG
15 LOZENGE BUCCAL
Status: DISCONTINUED | OUTPATIENT
Start: 2018-05-14 | End: 2018-05-16 | Stop reason: HOSPADM

## 2018-05-14 RX ORDER — ROCURONIUM BROMIDE 10 MG/ML
INJECTION, SOLUTION INTRAVENOUS AS NEEDED
Status: DISCONTINUED | OUTPATIENT
Start: 2018-05-14 | End: 2018-05-14 | Stop reason: SURG

## 2018-05-14 RX ORDER — GLYCOPYRROLATE 0.2 MG/ML
INJECTION INTRAMUSCULAR; INTRAVENOUS AS NEEDED
Status: DISCONTINUED | OUTPATIENT
Start: 2018-05-14 | End: 2018-05-14 | Stop reason: SURG

## 2018-05-14 RX ORDER — PROMETHAZINE HYDROCHLORIDE 25 MG/ML
12.5 INJECTION, SOLUTION INTRAMUSCULAR; INTRAVENOUS ONCE AS NEEDED
Status: DISCONTINUED | OUTPATIENT
Start: 2018-05-14 | End: 2018-05-14 | Stop reason: HOSPADM

## 2018-05-14 RX ORDER — PROMETHAZINE HYDROCHLORIDE 25 MG/1
25 TABLET ORAL ONCE AS NEEDED
Status: DISCONTINUED | OUTPATIENT
Start: 2018-05-14 | End: 2018-05-14 | Stop reason: HOSPADM

## 2018-05-14 RX ORDER — NALOXONE HCL 0.4 MG/ML
0.1 VIAL (ML) INJECTION
Status: DISCONTINUED | OUTPATIENT
Start: 2018-05-14 | End: 2018-05-16 | Stop reason: HOSPADM

## 2018-05-14 RX ADMIN — TRANEXAMIC ACID 1000 MG: 100 INJECTION, SOLUTION INTRAVENOUS at 08:49

## 2018-05-14 RX ADMIN — SUCCINYLCHOLINE CHLORIDE 140 MG: 20 INJECTION, SOLUTION INTRAMUSCULAR; INTRAVENOUS; PARENTERAL at 08:33

## 2018-05-14 RX ADMIN — ONDANSETRON 4 MG: 2 INJECTION INTRAMUSCULAR; INTRAVENOUS at 22:24

## 2018-05-14 RX ADMIN — OXYCODONE HYDROCHLORIDE 20 MG: 5 TABLET ORAL at 14:55

## 2018-05-14 RX ADMIN — HYDROMORPHONE HYDROCHLORIDE 0.5 MG: 1 INJECTION, SOLUTION INTRAMUSCULAR; INTRAVENOUS; SUBCUTANEOUS at 22:24

## 2018-05-14 RX ADMIN — FENTANYL CITRATE 50 MCG: 50 INJECTION INTRAMUSCULAR; INTRAVENOUS at 11:07

## 2018-05-14 RX ADMIN — METFORMIN HYDROCHLORIDE 500 MG: 500 TABLET ORAL at 17:27

## 2018-05-14 RX ADMIN — CEFAZOLIN 3 G: 1 INJECTION, POWDER, FOR SOLUTION INTRAMUSCULAR; INTRAVENOUS; PARENTERAL at 08:29

## 2018-05-14 RX ADMIN — INSULIN ASPART 4 UNITS: 100 INJECTION, SOLUTION INTRAVENOUS; SUBCUTANEOUS at 17:27

## 2018-05-14 RX ADMIN — Medication 5 MG: at 11:35

## 2018-05-14 RX ADMIN — FENTANYL CITRATE 100 MCG: 50 INJECTION INTRAMUSCULAR; INTRAVENOUS at 08:33

## 2018-05-14 RX ADMIN — OXYCODONE HYDROCHLORIDE 20 MG: 5 TABLET ORAL at 10:48

## 2018-05-14 RX ADMIN — FENTANYL CITRATE 50 MCG: 50 INJECTION INTRAMUSCULAR; INTRAVENOUS at 10:46

## 2018-05-14 RX ADMIN — SODIUM CHLORIDE, POTASSIUM CHLORIDE, SODIUM LACTATE AND CALCIUM CHLORIDE: 600; 310; 30; 20 INJECTION, SOLUTION INTRAVENOUS at 08:27

## 2018-05-14 RX ADMIN — FENTANYL CITRATE 50 MCG: 50 INJECTION INTRAMUSCULAR; INTRAVENOUS at 10:36

## 2018-05-14 RX ADMIN — CEFAZOLIN SODIUM 2 G: 2 INJECTION, SOLUTION INTRAVENOUS at 23:17

## 2018-05-14 RX ADMIN — HYDROMORPHONE HYDROCHLORIDE 0.5 MG: 1 INJECTION, SOLUTION INTRAMUSCULAR; INTRAVENOUS; SUBCUTANEOUS at 14:23

## 2018-05-14 RX ADMIN — PROPOFOL 200 MG: 10 INJECTION, EMULSION INTRAVENOUS at 08:33

## 2018-05-14 RX ADMIN — HYDROMORPHONE HYDROCHLORIDE 0.5 MG: 2 INJECTION INTRAMUSCULAR; INTRAVENOUS; SUBCUTANEOUS at 09:52

## 2018-05-14 RX ADMIN — INSULIN ASPART 2 UNITS: 100 INJECTION, SOLUTION INTRAVENOUS; SUBCUTANEOUS at 22:23

## 2018-05-14 RX ADMIN — SODIUM CHLORIDE, POTASSIUM CHLORIDE, SODIUM LACTATE AND CALCIUM CHLORIDE 9 ML/HR: 600; 310; 30; 20 INJECTION, SOLUTION INTRAVENOUS at 08:16

## 2018-05-14 RX ADMIN — FENTANYL CITRATE 50 MCG: 50 INJECTION INTRAMUSCULAR; INTRAVENOUS at 10:26

## 2018-05-14 RX ADMIN — ROCURONIUM BROMIDE 10 MG: 10 INJECTION INTRAVENOUS at 08:33

## 2018-05-14 RX ADMIN — GLYCOPYRROLATE 0.4 MG: 0.2 INJECTION INTRAMUSCULAR; INTRAVENOUS at 10:09

## 2018-05-14 RX ADMIN — KETOTIFEN FUMARATE 1 DROP: 0.35 SOLUTION/ DROPS OPHTHALMIC at 23:16

## 2018-05-14 RX ADMIN — OXYCODONE HYDROCHLORIDE 20 MG: 5 TABLET ORAL at 23:17

## 2018-05-14 RX ADMIN — NIFEDIPINE 60 MG: 60 TABLET, FILM COATED, EXTENDED RELEASE ORAL at 22:23

## 2018-05-14 RX ADMIN — NEOSTIGMINE METHYLSULFATE 4 MG: 1 INJECTION INTRAMUSCULAR; INTRAVENOUS; SUBCUTANEOUS at 10:09

## 2018-05-14 RX ADMIN — VANCOMYCIN HYDROCHLORIDE 2000 MG: 10 INJECTION, POWDER, LYOPHILIZED, FOR SOLUTION INTRAVENOUS at 08:13

## 2018-05-14 RX ADMIN — DOCUSATE SODIUM -SENNOSIDES 2 TABLET: 50; 8.6 TABLET, COATED ORAL at 22:23

## 2018-05-14 RX ADMIN — SODIUM CHLORIDE, POTASSIUM CHLORIDE, SODIUM LACTATE AND CALCIUM CHLORIDE 9 ML/HR: 600; 310; 30; 20 INJECTION, SOLUTION INTRAVENOUS at 10:54

## 2018-05-14 RX ADMIN — HYDROMORPHONE HYDROCHLORIDE 0.5 MG: 2 INJECTION INTRAMUSCULAR; INTRAVENOUS; SUBCUTANEOUS at 10:31

## 2018-05-14 RX ADMIN — CEFAZOLIN SODIUM 2 G: 2 INJECTION, SOLUTION INTRAVENOUS at 17:27

## 2018-05-14 RX ADMIN — HYDROMORPHONE HYDROCHLORIDE 0.5 MG: 2 INJECTION INTRAMUSCULAR; INTRAVENOUS; SUBCUTANEOUS at 10:41

## 2018-05-14 RX ADMIN — FENTANYL CITRATE 100 MCG: 50 INJECTION INTRAMUSCULAR; INTRAVENOUS at 08:55

## 2018-05-14 RX ADMIN — ONDANSETRON 4 MG: 2 INJECTION INTRAMUSCULAR; INTRAVENOUS at 09:43

## 2018-05-14 RX ADMIN — HYDROMORPHONE HYDROCHLORIDE 0.5 MG: 2 INJECTION INTRAMUSCULAR; INTRAVENOUS; SUBCUTANEOUS at 09:49

## 2018-05-14 RX ADMIN — HYDROMORPHONE HYDROCHLORIDE 0.5 MG: 2 INJECTION INTRAMUSCULAR; INTRAVENOUS; SUBCUTANEOUS at 10:55

## 2018-05-14 RX ADMIN — HYDROMORPHONE HYDROCHLORIDE 0.5 MG: 2 INJECTION INTRAMUSCULAR; INTRAVENOUS; SUBCUTANEOUS at 11:21

## 2018-05-14 RX ADMIN — SODIUM CHLORIDE, POTASSIUM CHLORIDE, SODIUM LACTATE AND CALCIUM CHLORIDE 100 ML/HR: 600; 310; 30; 20 INJECTION, SOLUTION INTRAVENOUS at 13:30

## 2018-05-14 RX ADMIN — OXYCODONE HYDROCHLORIDE 20 MG: 5 TABLET ORAL at 19:20

## 2018-05-14 RX ADMIN — ROCURONIUM BROMIDE 40 MG: 10 INJECTION INTRAVENOUS at 08:38

## 2018-05-14 RX ADMIN — OXYCODONE HYDROCHLORIDE AND ACETAMINOPHEN 1 TABLET: 10; 325 TABLET ORAL at 22:23

## 2018-05-14 RX ADMIN — LIDOCAINE HYDROCHLORIDE 60 MG: 20 INJECTION, SOLUTION INFILTRATION; PERINEURAL at 08:33

## 2018-05-14 RX ADMIN — MIDAZOLAM HYDROCHLORIDE 1 MG: 2 INJECTION, SOLUTION INTRAMUSCULAR; INTRAVENOUS at 08:13

## 2018-05-14 RX ADMIN — FAMOTIDINE 20 MG: 10 INJECTION INTRAVENOUS at 08:13

## 2018-05-14 NOTE — ANESTHESIA PROCEDURE NOTES
Airway  Urgency: elective    Airway not difficult    General Information and Staff    Patient location during procedure: OR  Anesthesiologist: STEPHANIE POWER  CRNA: DIANDRA VOSS    Indications and Patient Condition  Indications for airway management: airway protection    Preoxygenated: yes  MILS maintained throughout  Mask difficulty assessment: 1 - vent by mask    Final Airway Details  Final airway type: endotracheal airway      Successful airway: ETT  Cuffed: yes   Successful intubation technique: direct laryngoscopy  Facilitating devices/methods: intubating stylet  Endotracheal tube insertion site: oral  Blade: Wheat  Blade size: #2  ETT size: 7.0 mm  Cormack-Lehane Classification: grade I - full view of glottis  Placement verified by: chest auscultation and capnometry   Cuff volume (mL): 8  Measured from: lips  Number of attempts at approach: 1    Additional Comments  Pt preoxygenated, SIVI, bag mask vent, ATETI, dentition as before

## 2018-05-14 NOTE — THERAPY EVALUATION
Acute Care - Physical Therapy Initial Evaluation   [facility]     Patient Name: Katharine Mitchell  : 1960  MRN: [id number]  Today's Date: 2018   Onset of Illness/Injury or Date of Surgery: 18  Date of Referral to PT: 18  Referring Physician: Lamont Yang      Admit Date: 2018    Visit Dx:     ICD-10-CM ICD-9-CM   1. Difficulty walking R26.2 719.7     Patient Active Problem List   Diagnosis   • Abnormal EKG   • Essential hypertension   • Preop cardiovascular exam   • Osteoarthritis of right knee   • Osteoarthritis of left knee     Past Medical History:   Diagnosis Date   • Arthritis    • Diabetes mellitus     TYPE 2   • Hypertension    • Knee pain    • Sleep apnea     DOES NOT WEAR CPAP   • UTI (urinary tract infection)     TREATED FOR UTI 2017     Past Surgical History:   Procedure Laterality Date   • COLONOSCOPY     • GALLBLADDER SURGERY     • JOINT REPLACEMENT     • KNEE ARTHROSCOPY Left    • PILONIDAL CYSTECTOMY     • SD TOTAL KNEE ARTHROPLASTY Right 2017    Procedure: RT TOTAL KNEE ARTHROPLASTY;  Surgeon: Lamont Morales MD;  Location: [facility] OR;  Service: Orthopedics        PT ASSESSMENT (last 12 hours)      Physical Therapy Evaluation     Row Name 18 1429          PT Evaluation Time/Intention    Subjective Information complains of;weakness;fatigue;pain;nausea/vomiting  -MS     Document Type evaluation  -MS     Mode of Treatment individual therapy  -MS     Patient Effort adequate  -MS     Comment Pt. reports increased Left Knee pain as well as nausea with upright mobility.  -MS     Row Name 18 1429          General Information    Onset of Illness/Injury or Date of Surgery 18  -MS     Referring Physician Lamont Yang  -MS     Prior Level of Function independent:  -MS     Equipment Currently Used at Home none  -MS     Pertinent History of Current Functional Problem Pt. is s/p Left TKR  -MS     Existing Precautions/Restrictions fall  -MS      Equipment Ordered for Patient --   Pt. already owns a Rwx for home use.  -MS     Risks Reviewed patient:  -MS     Benefits Reviewed patient:  -MS     Barriers to Rehab none identified  -MS     Row Name 05/14/18 1429          Cognitive Assessment/Intervention- PT/OT    Orientation Status (Cognition) oriented x 3  -MS     Follows Commands (Cognition) WNL  -MS     Personal Safety Interventions fall prevention program maintained;gait belt;nonskid shoes/slippers when out of bed;supervised activity  -MS     Row Name 05/14/18 1429          Bed Mobility Assessment/Treatment    Bed Mobility Assessment/Treatment supine-sit;sit-supine  -MS     Supine-Sit [place] (Bed Mobility) contact guard  -MS     Row Name 05/14/18 1429          Transfer Assessment/Treatment    Transfer Assessment/Treatment sit-stand transfer;stand-sit transfer  -MS     Sit-Stand [place] (Transfers) contact guard;2 person assist;1 person to manage equipment  -MS     Stand-Sit [place] (Transfers) contact guard;2 person assist;1 person to manage equipment  -MS     Row Name 05/14/18 1429          Sit-Stand Transfer    Assistive Device (Sit-Stand Transfers) walker, front-wheeled  -MS     Row Name 05/14/18 1429          Stand-Sit Transfer    Assistive Device (Stand-Sit Transfers) walker, front-wheeled  -MS     Row Name 05/14/18 1429          Gait/Stairs Assessment/Training    [place] Level (Gait) contact guard;2 person assist;1 person to manage equipment  -MS     Assistive Device (Gait) walker, front-wheeled  -MS     Distance in Feet (Gait) 5 feet  -MS     Pattern (Gait) step-to  -MS     Deviations/Abnormal Patterns (Gait) antalgic;tiara decreased  -MS     Comment (Gait/Stairs) Limited by pain, fatigue, and nausea with upright mobility.  -MS     Row Name 05/14/18 1429          General ROM    GENERAL ROM COMMENTS BUE/RLE (WFL's)  -MS     Row Name 05/14/18 1429          General Assessment (Manual Muscle Testing)    Comment, General Manual  Muscle Testing (MMT) Assessment BUE/RLE (3+/5)  -MS     Row Name 05/14/18 1429          Therapeutic Exercise    Comment (Therapeutic Exercise) Left TKR Protocol x 10 reps completed with assist.  -MS     Row Name 05/14/18 1429          Pain Assessment    Additional Documentation Pain Scale: Numbers Pre/Post-Treatment (Group)  -MS     Row Name 05/14/18 1429          Pain Scale: Numbers Pre/Post-Treatment    Pain Scale: Numbers, Pretreatment 6/10  -MS     Pain Scale: Numbers, Post-Treatment 6/10  -MS     Pain Location - Side Left  -MS     Pain Location knee  -MS     Pain Intervention(s) Medication (See MAR);Cold applied;Repositioned;Elevated;Rest  -MS     Row Name             Wound 05/14/18 0935 Left knee incision    Wound - Properties Group Date first assessed: 05/14/18  -AB Time first assessed: 0935  -AB Side: Left  -AB Location: knee  -AB Type: incision  -AB    [facility] Name 05/14/18 1429          Physical Therapy Clinical Impression    Date of Referral to PT 05/14/18  -MS     Criteria for Skilled Interventions Met (PT Clinical Impression) treatment indicated  -MS     Rehab Potential (PT Clinical Summary) good, to achieve stated therapy goals  -MS     Row Name 05/14/18 1429          Physical Therapy Goals    Transfer Goal Selection (PT) transfer, PT goal 1  -MS     Gait Training Goal Selection (PT) gait training, PT goal 1  -MS     ROM Goal Selection (PT) ROM, PT goal 1  -MS     Stairs Goal Selection (PT) stairs, PT goal 1  -MS     Additional Documentation ROM Goal Selection (PT) (Row);Stairs Goal Selection (PT) (Row)  -MS     Row Name 05/14/18 1429          Transfer Goal 1 (PT)    Activity/Assistive Device (Transfer Goal 1, PT) transfers, all;walker, rolling  -MS     [place] Level/Cues Needed (Transfer Goal 1, PT) independent  -MS     Time Frame (Transfer Goal 1, PT) long term goal (LTG);2 - 3 days  -MS     Row Name 05/14/18 1429          Gait Training Goal 1 (PT)    Activity/Assistive Device (Gait Training Goal 1,  PT) gait (walking locomotion);walker, rolling  -MS     [place] Level (Gait Training Goal 1, PT) independent  -MS     Distance (Gait Goal 1, PT) 120 feet  -MS     Time Frame (Gait Training Goal 1, PT) long term goal (LTG);2 - 3 days  -MS     Row Name 05/14/18 1429          ROM Goal 1 (PT)    ROM Goal 1 (PT) Left knee AROM (5, 85)  -MS     Time Frame (ROM Goal 1, PT) long term goal (LTG);2 - 3 days  -MS     Row Name 05/14/18 1429          Stairs Goal 1 (PT)    Activity/Assistive Device (Stairs Goal 1, PT) stairs, all skills  -MS     [place] Level/Cues Needed (Stairs Goal 1, PT) contact guard assist  -MS     Number of Stairs (Stairs Goal 1, PT) 3   with 1 Handrail  -MS     Time Frame (Stairs Goal 1, PT) long term goal (LTG);2 - 3 days  -MS     Row Name 05/14/18 1429          Positioning and Restraints    Pre-Treatment Position in bed  -MS     Post Treatment Position chair  -MS     In Chair notified nsg;reclined;sitting;call light within reach;encouraged to call for assist;with family/caregiver;with nsg   All lines intact. Ice pack to Left knee.  -MS       User Key  (r) = Recorded By, (t) = Taken By, (c) = Cosigned By    Initials Name Provider Type    MS Shai AYESHA Lewis, PT Physical Therapist    AB Ronen Sims, RN Registered Nurse          Physical Therapy Education     Title: PT OT SLP Therapies (Done)     Topic: Physical Therapy (Done)     Point: Mobility training (Done)    Learning Progress Summary     Learner Status Readiness Method Response Comment Documented by    Patient Done Acceptance AZUL THOMPSON NR  MS 05/14/18 0044          Point: Home exercise program (Done)    Learning Progress Summary     Learner Status Readiness Method Response Comment Documented by    Patient Done Acceptance AZUL THOMPSON NR  MS 05/14/18 8902          Point: Body mechanics (Done)    Learning Progress Summary     Learner Status Readiness Method Response Comment Documented by    Patient Done Acceptance AZUL THOMPSON NR  MS 05/14/18 3982           Point: Precautions (Done)    Learning Progress Summary     Learner Status Readiness Method Response Comment Documented by    Patient Done Acceptance EAZUL,NR  MS 05/14/18 0184                      User Key     Initials Effective Dates Name Provider Type Discipline    MS 04/03/18 -  Shai Lewis, PT Physical Therapist PT                PT Recommendation and Plan  Anticipated Discharge Disposition (PT): home with home health care  Planned Therapy Interventions (PT Eval): balance training, bed mobility training, gait training, home exercise program, patient/family education, postural re-education, ROM (range of motion), strengthening, transfer training  Therapy Frequency (PT Clinical Impression): 2 times/day  Outcome Summary/Treatment Plan (PT)  Anticipated Equipment Needs at Discharge (PT):  (Pt. already owns a Rwx for home use.)  Anticipated Discharge Disposition (PT): home with home health care  Plan of Care Reviewed With: patient  Outcome Summary: Pt. will benefit from skilled inpt. P.T. to address her functional deficits and to assist pt. in regaining her independence with functional mobility.          Outcome Measures     Row Name 05/14/18 1400             How much help from another person do you currently need...    Turning from your back to your side while in flat bed without using bedrails? 3  -MS      Moving from lying on back to sitting on the side of a flat bed without bedrails? 3  -MS      Moving to and from a bed to a chair (including a wheelchair)? 3  -MS      Standing up from a chair using your arms (e.g., wheelchair, bedside chair)? 3  -MS      Climbing 3-5 steps with a railing? 3  -MS      To walk in hospital room? 3  -MS      AM-PAC 6 Clicks Score 18  -MS         Functional Assessment    Outcome Measure Options AM-PAC 6 Clicks Basic Mobility (PT)  -MS        User Key  (r) = Recorded By, (t) = Taken By, (c) = Cosigned By    Initials Name Provider Type    MS Shai Lewis, PT Physical  Therapist           Time Calculation:         PT Charges     Row Name 05/14/18 1445             Time Calculation    Start Time 1414  -MS      Stop Time 1430  -MS      Time Calculation (min) 16 min  -MS      PT Received On 05/14/18  -MS      PT - Next Appointment 05/15/18  -MS      PT Goal Re-Cert Due Date 05/16/18  -MS        User Key  (r) = Recorded By, (t) = Taken By, (c) = Cosigned By    Initials Name Provider Type    MS Shai Lewis, PT Physical Therapist          Therapy Charges for Today     Code Description Service Date Service Provider Modifiers Qty    27817536256 HC PT EVAL LOW COMPLEXITY 1 5/14/2018 Shai Lewis, PT GP 1    08269795979 HC PT THER PROC EA 15 MIN 5/14/2018 Shai Lewis, PT GP 1    96437669782 HC PT THER SUPP EA 15 MIN 5/14/2018 Shai Lewis, PT GP 1          PT G-Codes  Outcome Measure Options: AM-PAC 6 Clicks Basic Mobility (PT)      Shai Lewis, PT  5/14/2018

## 2018-05-14 NOTE — PLAN OF CARE
Problem: Patient Care Overview  Goal: Plan of Care Review   05/14/18 1443   Coping/Psychosocial   Plan of Care Reviewed With patient   OTHER   Outcome Summary Pt. will benefit from skilled inpt. P.T. to address her functional deficits and to assist pt. in regaining her independence with functional mobility.

## 2018-05-14 NOTE — PROGRESS NOTES
Discharge Planning Assessment   [facility]     Patient Name: Katharine Mitchell  MRN: [id number]  Today's Date: 5/14/2018    Admit Date: 5/14/2018          Discharge Needs Assessment     Row Name 05/14/18 1842       Living Environment    Lives With alone    Current Living Arrangements home/apartment/condo    Family Caregiver if Needed sibling(s)    Quality of Family Relationships helpful;involved;supportive    Able to Return to Prior Arrangements yes       Resource/Environmental Concerns    Resource/Environmental Concerns none       Transition Planning    Patient/Family Anticipates Transition to home with family    Patient/Family Anticipated Services at Transition home health care    Transportation Anticipated family or friend will provide       Discharge Needs Assessment    Readmission Within the Last 30 Days no previous admission in last 30 days    Concerns to be Addressed no discharge needs identified    Equipment Currently Used at Home glucometer;bipap/cpap    Discharge Facility/Level of Care Needs home with home health    Offered/Gave Vendor List yes            Discharge Plan     Row Name 05/14/18 1843       Plan    Plan Home w/ family and [facility].     Patient/Family in Agreement with Plan yes    Plan Comments S/w pt's brother, verified facesheet and d/c plan. Pt plans to d/c home with the help of family, she would like [facility]. [facility] [phone].         Destination     No service coordination in this encounter.      Durable Medical Equipment     No service coordination in this encounter.      Dialysis/Infusion     No service coordination in this encounter.      Home Medical Care - Selection Complete     Service Request Status Selected Specialties Address Phone Number Fax Number    [facility] Selected Home Health Services [address] [phone]        Katie Vasquez RN 5/14/2018 1841     8058 .5/14/2018                   Social  Care     No service coordination in this encounter.                Demographic Summary    No documentation.           Functional Status     Row Name 05/14/18 1842       Functional Status    Usual Activity Tolerance good    Current Activity Tolerance fair       Functional Status, IADL    Medications independent    Meal Preparation independent    Housekeeping independent    Laundry independent    Shopping independent       Mental Status Summary    Recent Changes in Mental Status/Cognitive Functioning no changes            Psychosocial    No documentation.           Abuse/Neglect    No documentation.           Legal    No documentation.           Substance Abuse    No documentation.           Patient Forms    No documentation.         Katie Vasquez RN

## 2018-05-14 NOTE — PLAN OF CARE
Problem: Patient Care Overview  Goal: Plan of Care Review  Outcome: Ongoing (interventions implemented as appropriate)   05/14/18 2835   Coping/Psychosocial   Plan of Care Reviewed With patient;family   OTHER   Outcome Summary pt admitted for total knee sx. B/p hypertensive, all other VSS. up with 1 assist to BSC/chair today. voiding ok on own. oxycodine prn for pain given this shift. dilaudid prn given once for severe pain. c/o pain behind knee. dressing wrapped in gauze; intact. ice pack to bandage. Will continue to monitor.   Plan of Care Review   Progress improving       Problem: Fall Risk (Adult)  Goal: Identify Related Risk Factors and Signs and Symptoms  Outcome: Outcome(s) achieved Date Met: 05/14/18    Goal: Absence of Fall  Outcome: Ongoing (interventions implemented as appropriate)      Problem: Knee Arthroplasty (Total, Partial) (Adult)  Goal: Signs and Symptoms of Listed Potential Problems Will be Absent, Minimized or Managed (Knee Arthroplasty)  Outcome: Ongoing (interventions implemented as appropriate)    Goal: Anesthesia/Sedation Recovery  Outcome: Outcome(s) achieved Date Met: 05/14/18      Problem: Pain, Acute (Adult)  Goal: Identify Related Risk Factors and Signs and Symptoms  Outcome: Outcome(s) achieved Date Met: 05/14/18    Goal: Acceptable Pain Control/Comfort Level  Outcome: Ongoing (interventions implemented as appropriate)

## 2018-05-14 NOTE — H&P
History and physical update:  No changes in history/physical since last update.    Has been cleared by PCP.  Impression: Left knee OA  Plan: Left TKA    Lamont Morales MD

## 2018-05-14 NOTE — ANESTHESIA POSTPROCEDURE EVALUATION
Patient: Katharine Mitchell    Procedure Summary     Date:  05/14/18 Room / Location:  [facility] OR [address] [place] [address] MAIN OR    Anesthesia Start:  0827 Anesthesia Stop:  1024    Procedure:  LEFT TOTAL KNEE ARTHROPLASTY (Left Knee) Diagnosis:      Surgeon:  Lamont Morales MD Provider:  Rashid Palencia MD    Anesthesia Type:  general ASA Status:  3          Anesthesia Type: general  Last vitals  BP   151/79 (05/14/18 0808)   Temp   36.8 °C (98.2 °F) (05/14/18 0746)   Pulse   96 (05/14/18 0817)   Resp   20 (05/14/18 0817)     SpO2   94 % (05/14/18 0817)     Post Anesthesia Care and Evaluation    Patient location during evaluation: PACU  Patient participation: complete - patient participated  Level of consciousness: awake and alert  Pain management: adequate  Airway patency: patent  Anesthetic complications: No anesthetic complications    Cardiovascular status: acceptable  Respiratory status: acceptable  Hydration status: acceptable    Comments: --------------------            05/14/18 0817     --------------------   BP:                  Pulse:      96       Resp:       20       Temp:                SpO2:      94%      --------------------

## 2018-05-14 NOTE — OP NOTE
TOTAL KNEE ARTHROPLASTY  Procedure Note    Katharine Mitchell  5/14/2018    Pre-op Diagnosis: Osteoarthritis Left  knee primary generalized  Post-op Diagnosis:Same  Procedure: Left  Total Knee Arthroplasty  Surgeon:  Lamont Morales MD  Anesthesia: General, Anesthesiologist: Rashid Palencia MD  CRNA: Berta Pickard CRNA  Staff: Circulator: Ronen Sims RN; Harpreet Harper RN  Scrub Person: Mukul Barroso  Vendor Representative: Jasvir Ch  Assistant: Trevor Mccarty DO CFA  Estimated Blood Loss:minimal  Specimens:* No orders in the log *   Drains: none  Complications: None    Components Utilized:      Implant Name Type Inv. Item Serial No.  Lot No. LRB No. Used   CMT BONE PALACOS R HI/VISC 1X40 - [id number] Implant CMT BONE PALACOS R HI/VISC 1X40  [facility] [employer] [id number] Left 2   COMP FEM PERSONA CR CMT COCR STD SZ8 LT - [id number] Implant COMP FEM PERSONA CR CMT COCR STD SZ8 LT  [employer] [id number] Left 1   STEM TIB PERSONA CMT 5D FLORA LT - [id number] Implant STEM TIB PERSONA CMT 5D FLORA LT  [employer] [id number] Left 1   PAT KN PERSONA VE CRS/LNK CMT 32MM - [id number] Implant PAT KN PERSONA VE CRS/LNK CMT 32MM  [employer] [id number] Left 1   ART/SRF KN PERSONA/VE PS EF 8TO11 10MM LT - [id number] Implant ART/SRF KN PERSONA/VE PS EF 8TO11 10MM LT   [employer] [id number] Left 1       Indication for Procedure:   The patient is a 57 y.o. female presents today for a total knee arthroplasty procedure because of failure to conservatively manage the patients pain for arthritis.  The patient was educated in risks of surgery that could include possible risk of infection, deep venous thrombosis, pulmonary embolism, fracture, neurovascular injury, leg length discrepancy, dislocation, possible persistent pain, need for additional surgeries, anesthetic risks, medical risks including heart attack and stroke, and death.  The discussion occurred in the office pre-operatively, and patient had  the opportunity to ask questions, and concerns about the proposed surgery.  The patient also understood that medicine is not an exact science, and that outcomes of the surgical procedure may be less than desired. The patient wished to proceed.      Protocols for intravenous antibiotics and venous thrombosis were followed for this patient.  IV antibiotics were infused prior to surgery and will be discontinued within 24 hours of completion of the surgical procedure.  Thrombosis prophylaxis will be initiated within 24 hours of the completion of the surgical procedure.      Procedure:   After the patient was identified in the preoperative area, and the surgical site confirmed and marked, the patient was brought to the operating room on a stretcher.  They were placed supine on the operating room table and the above anesthetic was placed uneventfully.  A time-out procedure was performed.  The intravenous antibiotics infusion was completed.  A non-sterile tourniquet was placed on the operative thigh, and was prepped and draped in the usual sterile fashion.  An Esmarch was to exsanguinate the limb, and the tourniquet was inflated.     A 10 blade scalpel was used to make a longitudinal incision from above the patella to medial to the tibial tubercle.  A medial marlo-patellar arthrotomy was performed with another 10 blade scalpel.  The medial joint line was elevated subperiosteally with electrocautery and a ayala elevator.  The patellar fat pad was removed.  The patella was everted and osteotomy cut completed with oscillating saw.  The patella guide and drill was used to finish preparing the patella.  A patella protecting guide was placed, and the patella was everted off the side of the femur laterally.     The knee was then flexed and Costilla's line was drawn on the distal femur with electrocautery.  Then the drill was placed into the distal femur into the medullary canal.  The intramedullary distal femoral valgus cutting  guide set to 5 degrees of femoral valgus was then placed into the medullary canal.  It was then pinned and the oscillating saw was used to make the osteotomy.  Then the anterior referencing distal femoral guide was then placed and the above femoral size was measured and 3 degrees of external rotation were drilled.  The 4 in 1 femoral cutting guide was placed and pinned and the oscillating saw was used to make the appropriate cuts.     Then the extramedullary cutting guide was placed aligned with the tibial eminence superiorly and the tibial shaft distally, pinned 4 mm from the medial tibial plateau.  The oscillating saw was then used to complete the osteotomy cuts.   A laminar  was then placed medially and then laterally to remove the medial and lateral meniscus and the posterior osteophytes.  Then the tibial baseplate was placed on the tibial surface with a drop carmela to confirm an appropriate cut and size of implant.  It was then pinned externally rotated to the medial third of the tibial tubercle.  Then trial reduction was then performed with the above implants and was found to be stable in all planes.  The patella tracked centrally on the trochlear groove.    The lug holes were drilled in the distal femur and the tibia was drilled an broached in the typical fashion.  The trial components were then removed and the final implants were confirmed and opened.  The bone surfaces were then irrigated and dried.  Palacos cement was then mixed and placed on the cut bone surfaces and back side of the implants.  The implants were then impacted into place, and the trial polyethylene spacer was placed and the knee was placed into extension.  A stack of towels was placed under the ankle and the cement was allowed to harden. The tourniquet was then dropped.  Hemostasis was obtained.  The wound was then irrigated with the pulse lavage irrigation system with a 3 liter mixture of betadine and normal saline.  The local  mixture was injected throughout the knee for post-operative pain control.  The final polyethylene implant was then inserted and the knee was flexed to 90 degrees and the arthrotomy was closed with #1 vicryl suture and #1 strata fix suture.  The deep dermis was closed with 2-0 vicryl, and the skin was closed with 3-0 Monocryl suture.  Skin glue was applied and sterile dressing were applied.   Sponge and needle counts were completed and were correct.  The patient was awaken from anesthetic and was returned to recovery in stable condition.    Lamont Morales MD     Date: 5/14/2018  Time: 10:02 AM

## 2018-05-14 NOTE — ANESTHESIA PREPROCEDURE EVALUATION
Anesthesia Evaluation     Patient summary reviewed and Nursing notes reviewed   NPO Solid Status: > 8 hours  NPO Liquid Status: > 2 hours           Airway   Mallampati: II  Dental      Pulmonary    (+) sleep apnea,   Cardiovascular     ECG reviewed    (+) hypertension,       Neuro/Psych- negative ROS  GI/Hepatic/Renal/Endo    (+) morbid obesity,  diabetes mellitus,     Musculoskeletal     Abdominal    Substance History - negative use     OB/GYN negative ob/gyn ROS         Other   (+) arthritis                   Anesthesia Plan    ASA 3     general     intravenous induction   Anesthetic plan and risks discussed with patient.

## 2018-05-15 PROBLEM — IMO0001 CLASS 3 OBESITY DUE TO EXCESS CALORIES IN ADULT: Status: ACTIVE | Noted: 2018-05-15

## 2018-05-15 PROBLEM — E11.9 TYPE 2 DIABETES MELLITUS: Status: ACTIVE | Noted: 2018-05-15

## 2018-05-15 PROBLEM — G47.33 OSA (OBSTRUCTIVE SLEEP APNEA): Status: ACTIVE | Noted: 2018-05-15

## 2018-05-15 LAB
ANION GAP SERPL CALCULATED.3IONS-SCNC: 13.5 MMOL/L
BACTERIA UR QL AUTO: ABNORMAL /HPF
BILIRUB UR QL STRIP: NEGATIVE
BUN BLD-MCNC: 11 MG/DL (ref 6–20)
BUN/CREAT SERPL: 13.1 (ref 7–25)
CALCIUM SPEC-SCNC: 8.8 MG/DL (ref 8.6–10.5)
CHLORIDE SERPL-SCNC: 95 MMOL/L (ref 98–107)
CLARITY UR: CLEAR
CO2 SERPL-SCNC: 22.5 MMOL/L (ref 22–29)
COLOR UR: YELLOW
CREAT BLD-MCNC: 0.84 MG/DL (ref 0.57–1)
GFR SERPL CREATININE-BSD FRML MDRD: 84 ML/MIN/1.73
GLUCOSE BLD-MCNC: 198 MG/DL (ref 65–99)
GLUCOSE BLDC GLUCOMTR-MCNC: 168 MG/DL (ref 70–130)
GLUCOSE BLDC GLUCOMTR-MCNC: 174 MG/DL (ref 70–130)
GLUCOSE BLDC GLUCOMTR-MCNC: 211 MG/DL (ref 70–130)
GLUCOSE BLDC GLUCOMTR-MCNC: 260 MG/DL (ref 70–130)
GLUCOSE UR STRIP-MCNC: NEGATIVE MG/DL
HCT VFR BLD AUTO: 38.2 % (ref 35.6–45.5)
HGB BLD-MCNC: 13.1 G/DL (ref 11.9–15.5)
HGB UR QL STRIP.AUTO: NEGATIVE
HYALINE CASTS UR QL AUTO: ABNORMAL /LPF
KETONES UR QL STRIP: NEGATIVE
LEUKOCYTE ESTERASE UR QL STRIP.AUTO: NEGATIVE
NITRITE UR QL STRIP: NEGATIVE
PH UR STRIP.AUTO: 5.5 [PH] (ref 5–8)
POTASSIUM BLD-SCNC: 4.1 MMOL/L (ref 3.5–5.2)
PROT UR QL STRIP: ABNORMAL
RBC # UR: ABNORMAL /HPF
REF LAB TEST METHOD: ABNORMAL
SODIUM BLD-SCNC: 131 MMOL/L (ref 136–145)
SP GR UR STRIP: 1.02 (ref 1–1.03)
SQUAMOUS #/AREA URNS HPF: ABNORMAL /HPF
UROBILINOGEN UR QL STRIP: ABNORMAL
WBC UR QL AUTO: ABNORMAL /HPF

## 2018-05-15 PROCEDURE — 85018 HEMOGLOBIN: CPT | Performed by: ORTHOPAEDIC SURGERY

## 2018-05-15 PROCEDURE — 97110 THERAPEUTIC EXERCISES: CPT

## 2018-05-15 PROCEDURE — 97150 GROUP THERAPEUTIC PROCEDURES: CPT

## 2018-05-15 PROCEDURE — 25010000002 ENOXAPARIN PER 10 MG: Performed by: ORTHOPAEDIC SURGERY

## 2018-05-15 PROCEDURE — 82962 GLUCOSE BLOOD TEST: CPT

## 2018-05-15 PROCEDURE — 85014 HEMATOCRIT: CPT | Performed by: ORTHOPAEDIC SURGERY

## 2018-05-15 PROCEDURE — 81001 URINALYSIS AUTO W/SCOPE: CPT | Performed by: INTERNAL MEDICINE

## 2018-05-15 PROCEDURE — 80048 BASIC METABOLIC PNL TOTAL CA: CPT | Performed by: ORTHOPAEDIC SURGERY

## 2018-05-15 PROCEDURE — 63710000001 INSULIN ASPART PER 5 UNITS: Performed by: ORTHOPAEDIC SURGERY

## 2018-05-15 RX ORDER — ASPIRIN 325 MG
325 TABLET ORAL DAILY
Qty: 42 TABLET | Refills: 0 | Status: SHIPPED | OUTPATIENT
Start: 2018-05-15 | End: 2018-06-26

## 2018-05-15 RX ORDER — SPIRONOLACTONE 50 MG/1
50 TABLET, FILM COATED ORAL EVERY MORNING
Status: DISCONTINUED | OUTPATIENT
Start: 2018-05-15 | End: 2018-05-16 | Stop reason: HOSPADM

## 2018-05-15 RX ORDER — OXYCODONE AND ACETAMINOPHEN 10; 325 MG/1; MG/1
1-2 TABLET ORAL EVERY 4 HOURS PRN
Qty: 56 TABLET | Refills: 0 | Status: SHIPPED | OUTPATIENT
Start: 2018-05-15 | End: 2018-05-17 | Stop reason: HOSPADM

## 2018-05-15 RX ADMIN — NIFEDIPINE 60 MG: 60 TABLET, FILM COATED, EXTENDED RELEASE ORAL at 09:21

## 2018-05-15 RX ADMIN — MULTIPLE VITAMINS W/ MINERALS TAB 1 TABLET: TAB at 05:57

## 2018-05-15 RX ADMIN — DIAZEPAM 5 MG: 5 TABLET ORAL at 01:49

## 2018-05-15 RX ADMIN — KETOTIFEN FUMARATE 1 DROP: 0.35 SOLUTION/ DROPS OPHTHALMIC at 09:24

## 2018-05-15 RX ADMIN — AZELASTINE HYDROCHLORIDE 1 SPRAY: 137 SPRAY, METERED NASAL at 17:56

## 2018-05-15 RX ADMIN — METFORMIN HYDROCHLORIDE 500 MG: 500 TABLET ORAL at 17:55

## 2018-05-15 RX ADMIN — INSULIN ASPART 4 UNITS: 100 INJECTION, SOLUTION INTRAVENOUS; SUBCUTANEOUS at 09:21

## 2018-05-15 RX ADMIN — OXYCODONE HYDROCHLORIDE 20 MG: 5 TABLET ORAL at 05:57

## 2018-05-15 RX ADMIN — OXYCODONE HYDROCHLORIDE AND ACETAMINOPHEN 1 TABLET: 10; 325 TABLET ORAL at 09:21

## 2018-05-15 RX ADMIN — OXYCODONE HYDROCHLORIDE AND ACETAMINOPHEN 1 TABLET: 10; 325 TABLET ORAL at 01:49

## 2018-05-15 RX ADMIN — MELOXICAM 15 MG: 15 TABLET ORAL at 09:21

## 2018-05-15 RX ADMIN — INSULIN ASPART 6 UNITS: 100 INJECTION, SOLUTION INTRAVENOUS; SUBCUTANEOUS at 12:58

## 2018-05-15 RX ADMIN — METFORMIN HYDROCHLORIDE 500 MG: 500 TABLET ORAL at 09:21

## 2018-05-15 RX ADMIN — KETOTIFEN FUMARATE 1 DROP: 0.35 SOLUTION/ DROPS OPHTHALMIC at 20:50

## 2018-05-15 RX ADMIN — SPIRONOLACTONE 50 MG: 50 TABLET ORAL at 09:22

## 2018-05-15 RX ADMIN — DOCUSATE SODIUM -SENNOSIDES 2 TABLET: 50; 8.6 TABLET, COATED ORAL at 09:21

## 2018-05-15 RX ADMIN — ENOXAPARIN SODIUM 40 MG: 40 INJECTION SUBCUTANEOUS at 09:22

## 2018-05-15 RX ADMIN — INSULIN ASPART 2 UNITS: 100 INJECTION, SOLUTION INTRAVENOUS; SUBCUTANEOUS at 17:55

## 2018-05-15 RX ADMIN — OXYCODONE HYDROCHLORIDE AND ACETAMINOPHEN 1 TABLET: 10; 325 TABLET ORAL at 20:47

## 2018-05-15 RX ADMIN — DOCUSATE SODIUM -SENNOSIDES 2 TABLET: 50; 8.6 TABLET, COATED ORAL at 20:47

## 2018-05-15 RX ADMIN — NIFEDIPINE 60 MG: 60 TABLET, FILM COATED, EXTENDED RELEASE ORAL at 20:47

## 2018-05-15 RX ADMIN — OXYCODONE HYDROCHLORIDE 20 MG: 5 TABLET ORAL at 12:59

## 2018-05-15 NOTE — PLAN OF CARE
Problem: Patient Care Overview  Goal: Plan of Care Review  Outcome: Ongoing (interventions implemented as appropriate)   05/15/18 1946   Coping/Psychosocial   Plan of Care Reviewed With patient   OTHER   Outcome Summary Pt VSS, afebrile, A/O4, accuchecks elevated with s/s coverage given today, BP elevated, staff to do manual BP per MD, new order for UA sent, pain controlled with prn pain medication. Educated pt on monitoring blood sugars at home 2/2 DM history.   Plan of Care Review   Progress improving       Problem: Fall Risk (Adult)  Goal: Absence of Fall  Outcome: Ongoing (interventions implemented as appropriate)      Problem: Knee Arthroplasty (Total, Partial) (Adult)  Goal: Signs and Symptoms of Listed Potential Problems Will be Absent, Minimized or Managed (Knee Arthroplasty)  Outcome: Ongoing (interventions implemented as appropriate)      Problem: Pain, Acute (Adult)  Goal: Acceptable Pain Control/Comfort Level  Outcome: Ongoing (interventions implemented as appropriate)

## 2018-05-15 NOTE — PLAN OF CARE
Problem: Patient Care Overview  Goal: Plan of Care Review   05/15/18 0914   Coping/Psychosocial   Plan of Care Reviewed With patient   OTHER   Outcome Summary Improved tolerance to functional activity this day with an increase in gait distance, progression of ther. ex. protocol, and completion of stair training. Pt. to discharge home with HHPT this day.   Plan of Care Review   Progress improving

## 2018-05-15 NOTE — DISCHARGE SUMMARY
Discharge Summary    Date of Admission: 5/14/2018  7:24 AM  Date of Discharge:  5/16/18    Discharge Diagnosis:   Osteoarthritis of left knee [M17.12]      PMHX:   Past Medical History:   Diagnosis Date   • Arthritis    • Diabetes mellitus     TYPE 2   • Hypertension    • Knee pain    • Sleep apnea     DOES NOT WEAR CPAP   • UTI (urinary tract infection)     TREATED FOR UTI JAN 2017       Discharge Disposition  [place]Health Care [facility] [place]    Procedures Performed  Procedure(s):  LEFT TOTAL KNEE ARTHROPLASTY       Indication for Admission  Patient is a 57 y.o. female admitted after undergoing the above surgical procedure. They were admitted for post-operative pain control, medical management and physical therapy.  They progressed with physical therapy.  Medicine consult was obtained to review home BP meds as it was not well controlled during her hospital stay.   Spironolactone was held initially due to being on IV fluids with potassium but was restarted POD #1 am. Once seen by medicine, she will be discharged home.      Consults:   Consults     Date and Time Order Name Status Description    5/15/2018 0742 Inpatient Internal Medicine Consult            Discharge Instructions:  Patient is weight bearing as tolerated on the operative leg.  Patient is to progress range of motion and ambulation as tolerated.  Use walker as needed for stability and gait.  May progress to cane as tolerated.  The dressing should be left on knee until post-operative day 7, and then removed.  Dressing is waterproof. Patient may shower.  Use ace wrap as needed for swelling.  Patient will follow-up in the office in 2 weeks.  Call the office at [phone] for any questions or concerns.      Discharge Medications   Katharine Mitchell   Home Medication Instructions ALMA:[id number]    Printed on:05/15/18 0748   Medication Information                      aspirin (GOLD ASPIRIN) 325 MG tablet  Take 1 tablet by mouth Daily for 42 days.              azelastine (ASTELIN) 0.1 % nasal spray  1 spray into each nostril Every Evening.             Blood Glucose Monitoring Suppl (ONE TOUCH ULTRA 2) W/DEVICE kit               fluticasone (FLONASE) 50 MCG/ACT nasal spray  2 sprays into each nostril Every Morning.             furosemide (LASIX) 20 MG tablet  Take 20 mg by mouth As Needed (PRN SWELLING).             glucose blood (ONE TOUCH ULTRA TEST) test strip               ketotifen (ZADITOR) 0.025 % ophthalmic solution  Administer 1 drop to both eyes 2 (Two) Times a Day.             meloxicam (MOBIC) 15 MG tablet  Take 15 mg by mouth Daily. To stop before surgery             metFORMIN (GLUCOPHAGE) 500 MG tablet  Take 500 mg by mouth 2 (Two) Times a Day With Meals.             Multiple Vitamins-Minerals (MULTIVITAMIN ADULT PO)  Take 1 tablet by mouth Every Morning. To stop before surgery             NIFEdipine XL (PROCARDIA XL) 60 MG 24 hr tablet  Take 60 mg by mouth 2 (Two) Times a Day.             Omega-3 Fatty Acids (FISH OIL) 1000 MG capsule capsule  Take 1,000 mg by mouth Daily With Breakfast. TO STOP 1 WK SD SURG             ONETOUCH DELICA LANCETS 33G misc               oxyCODONE-acetaminophen (PERCOCET)  MG per tablet  Take 1-2 tablets by mouth Every 4 (Four) Hours As Needed for Moderate Pain  (max 8/day) for up to 9 days.             sennosides-docusate sodium (SENOKOT-S) 8.6-50 MG tablet  Take 2 tablets by mouth 2 (Two) Times a Day As Needed for constipation.             spironolactone (ALDACTONE) 25 MG tablet  Take 50 mg by mouth Every Morning.                 Discharge Diet:   Diet Instructions     Advance Diet As Tolerated             Activity at Discharge:   Activity Instructions     Activity as Tolerated       Discharge Activity Restrictions       No driving until cleared by physician          Follow-up Appointments  No future appointments.  Additional Instructions for the Follow-ups that You Need to Schedule     Discharge Follow-up with  Specified Provider: Dr. Malloy; 2 Weeks    As directed      To:  Dr. Malloy    Follow Up:  2 Weeks         Referral to Home Health    As directed      Face to Face Visit Date:  5/15/2018    Follow-up Provider for Plan of Care?:  I will be treating the patient on an ongoing basis.  Please send me the Plan of Care for signature.    Follow-up Provider:  LAMONT MALLOY [[id number]]    Reason/Clinical Findings:  s/p TKA    Describe mobility limitations that make leaving home difficult:  taxing effort    Nursing/Therapeutic Services Requested:  Physical Therapy    PT orders:  Total joint pathway    Frequency:  1 Week 1               Test Results Pending at Discharge  none     Lamont Malloy MD  5/16/18

## 2018-05-15 NOTE — PROGRESS NOTES
Procedure(s):  LEFT TOTAL KNEE ARTHROPLASTY     LOS: 1 day     Subjective :   Complains of pain.  Controlled with meds.      Objective :    Vital signs in last 24 hours:  Vitals:    05/14/18 1935 05/14/18 2258 05/14/18 2324 05/15/18 0346   BP: (!) 191/101  132/94 (!) 189/98   BP Location: Left arm Left arm Left arm Left arm   Patient Position: Lying Lying Lying Lying   Pulse: 97 92  94   Resp: 18 16  16   Temp: 98.6 °F (37 °C) (!) 100.6 °F (38.1 °C) 98.8 °F (37.1 °C) 99.4 °F (37.4 °C)   TempSrc: Oral Oral Oral Oral   SpO2: 95% 97%  95%   Weight:       Height:           PHYSICAL EXAM:  Patient is calm, in no acute distress, awake and oriented x 3.  Dressing is clean, dry and intact.  No signs of infection.  Swelling is appropriate in amount.  Ecchymosis is appropriate in amount.  Homans test is negative.  Patient is neurovascularly intact distally.    LABS:    Results from last 7 days  Lab Units 05/15/18  0320   HEMOGLOBIN g/dL 13.1   HEMATOCRIT % 38.2               ASSESSMENT:  Status post Procedure(s):  LEFT TOTAL KNEE ARTHROPLASTY    HTN    Plan: Continue home BP meds.  Will have [facility] to see if home meds need to be adjusted.  Patient follow up with PCP to followup on BP.      Continue Physical Therapy, increase mobility and range of motion as tolerated.  Continue SCDs, Continue Lovenox for DVT prophylaxis while inpatient.  Aspirin for home.  Dispo planning for home today.    Lamont Morales MD    Date: 5/15/2018  Time: 7:07 AM

## 2018-05-15 NOTE — THERAPY DISCHARGE NOTE
Acute Care - Physical Therapy Treatment Note/Discharge   [facility]     Patient Name: Katharine Mitchell  : 1960  MRN: [id number]  Today's Date: 5/15/2018  Onset of Illness/Injury or Date of Surgery: 18  Date of Referral to PT: 18  Referring Physician: Lamont Yang    Admit Date: 2018    Visit Dx:    ICD-10-CM ICD-9-CM   1. Difficulty walking R26.2 719.7   2. Primary osteoarthritis of left knee M17.12 715.16     Patient Active Problem List   Diagnosis   • Abnormal EKG   • Essential hypertension   • Preop cardiovascular exam   • Osteoarthritis of right knee   • Osteoarthritis of left knee       Physical Therapy Education     Title: PT OT SLP Therapies (Done)     Topic: Physical Therapy (Done)     Point: Mobility training (Done)    Learning Progress Summary     Learner Status Readiness Method Response Comment Documented by    Patient Done Acceptance AZUL THOMPSON DU  MS 05/15/18 0914     Done Acceptance AZUL THOMPSON,NR  MS 18 1444          Point: Home exercise program (Done)    Learning Progress Summary     Learner Status Readiness Method Response Comment Documented by    Patient Done Acceptance AZUL THOMPSON DU  MS 05/15/18 0914     Done Acceptance E,D VU,NR  MS 18 1444          Point: Body mechanics (Done)    Learning Progress Summary     Learner Status Readiness Method Response Comment Documented by    Patient Done Acceptance AZUL THOMPSON DU  MS 05/15/18 0914     Done Acceptance AZUL THOMPSON VU,NR  MS 18 1444          Point: Precautions (Done)    Learning Progress Summary     Learner Status Readiness Method Response Comment Documented by    Patient Done Acceptance AZUL THOMPSON DU  MS 05/15/18 0914     Done Acceptance JAYD VU,NR  MS 18 1444                      User Key     Initials Effective Dates Name Provider Type Discipline    MS 18 -  Shai Lewis PT Physical Therapist PT                Therapy Treatment        Rehabilitation Treatment Summary     Row Name 05/15/18 0912              Treatment Time/Intention    Discipline physical therapist  -MS      Document Type therapy note (daily note);discharge treatment  -MS      Subjective Information complains of;fatigue;pain  -MS      Mode of Treatment individual therapy  -MS      Patient Effort good  -MS      Comment Pt. reports continued pain/soreness in her Left knee this AM.  -MS      Recorded by [MS] Shai Lewis, PT 05/15/18 0914 05/15/18 0914      Row Name 05/15/18 0912             Mobility Assessment/Intervention    Extremity Weight-bearing Status left lower extremity  -MS      Left Lower Extremity (Weight-bearing Status) weight-bearing as tolerated (WBAT)  -MS      Recorded by [MS] Shai Lewis, PT 05/15/18 0914 05/15/18 0914      Row Name 05/15/18 0912             Bed Mobility Assessment/Treatment    Comment (Bed Mobility) Up in chair  -MS      Recorded by [MS] Shai Lewis, PT 05/15/18 0914 05/15/18 0914      Row Name 05/15/18 0912             Transfer Assessment/Treatment    Sit-Stand [place] (Transfers) conditional independence  -MS      Stand-Sit [place] (Transfers) conditional independence  -MS      Recorded by [MS] Shai Lewis, PT 05/15/18 0914 05/15/18 0914      Row Name 05/15/18 0912             Sit-Stand Transfer    Assistive Device (Sit-Stand Transfers) walker, front-wheeled  -MS      Recorded by [MS] Shai Lewis, PT 05/15/18 0914 05/15/18 0914      Row Name 05/15/18 0912             Stand-Sit Transfer    Assistive Device (Stand-Sit Transfers) walker, front-wheeled  -MS      Recorded by [MS] Shai Lewis, PT 05/15/18 0914 05/15/18 0914      Row Name 05/15/18 0912             Gait/Stairs Assessment/Training    [place] Level (Gait) independent  -MS      Assistive Device (Gait) walker, front-wheeled  -MS      Distance in Feet (Gait) 55 feet  -MS      Pattern (Gait) step-through  -MS      Deviations/Abnormal Patterns (Gait) antalgic;stride length decreased  -MS      [place] Level (Stairs)  contact guard  -MS      Handrail Location (Stairs) both sides  -MS      Number of Steps (Stairs) 4  -MS      Recorded by [MS] Shai Lewis, PT 05/15/18 0914 05/15/18 0914      Row Name 05/15/18 0912             General ROM    LT Lower Ext Lt Knee Extension/Flexion  -MS      Recorded by [MS] Shai Lewis, PT 05/15/18 0914 05/15/18 0914      Row Name 05/15/18 0912             Left Lower Ext    Lt Knee Extension/Flexion AROM (10, 65)  -MS      Recorded by [MS] Shai Lewis, PT 05/15/18 0914 05/15/18 0914      Row Name 05/15/18 0912             Therapeutic Exercise    Comment (Therapeutic Exercise) Left TKR protocol x 15 reps completed; Assist with SLR's only  -MS      Recorded by [MS] Shai Lewis, PT 05/15/18 0914 05/15/18 0914      Row Name 05/15/18 0912             Positioning and Restraints    Pre-Treatment Position sitting in chair/recliner  -MS      Post Treatment Position chair  -MS      In Chair notified nsg;reclined;sitting;call light within reach;encouraged to call for assist;with family/caregiver  -MS      Recorded by [MS] Shai Lewis, PT 05/15/18 0914 05/15/18 0914      Row Name 05/15/18 0912             Pain Scale: Numbers Pre/Post-Treatment    Pain Scale: Numbers, Pretreatment 5/10  -MS      Pain Scale: Numbers, Post-Treatment 5/10  -MS      Pain Location - Side Left  -MS      Pain Location knee  -MS      Pain Intervention(s) Medication (See MAR);Cold applied;Repositioned;Elevated;Rest  -MS      Recorded by [MS] Shai Lewis, PT 05/15/18 0914 05/15/18 0914      Row Name                Wound 05/14/18 0935 Left knee incision    Wound - Properties Group Date first assessed: 05/14/18 [AB] Time first assessed: 0935 [AB] Side: Left [AB] Location: knee [AB] Type: incision [AB] Recorded by:  [AB] Ronen Sims RN 05/14/18 0935 05/14/18 0935      User Key  (r) = Recorded By, (t) = Taken By, (c) = Cosigned By    Initials Name Effective Dates Discipline    MS Shai Lewis, PT  04/03/18 -  PT    AB Ronen Sims RN 01/05/18 -  Nurse        Wound 05/14/18 0935 Left knee incision (Active)   Dressing Appearance dry;intact;no drainage 5/14/2018  7:58 PM   Closure ESTELLE 5/14/2018  7:58 PM   Drainage Amount none 5/14/2018 12:08 PM   Dressing Care, Wound elastic bandage;gauze;low-adherent 5/14/2018 12:08 PM       PT Recommendation and Plan  Anticipated Discharge Disposition (PT): home with home health care  Planned Therapy Interventions (PT Eval): balance training, bed mobility training, gait training, home exercise program, patient/family education, postural re-education, ROM (range of motion), strengthening, transfer training  Therapy Frequency (PT Clinical Impression): 2 times/day  Outcome Summary/Treatment Plan (PT)  Anticipated Equipment Needs at Discharge (PT):  (Pt. already owns a Rwx for home use.)  Anticipated Discharge Disposition (PT): home with home health care  Plan of Care Reviewed With: patient  Progress: improving  Outcome Summary: Improved tolerance to functional activity this day with an increase in gait distance, progression of ther. ex. protocol, and completion of stair training.  Pt. to discharge home with HHPT this day.          Outcome Measures     Row Name 05/15/18 0900 05/14/18 1400          How much help from another person do you currently need...    Turning from your back to your side while in flat bed without using bedrails? 4  -MS 3  -MS     Moving from lying on back to sitting on the side of a flat bed without bedrails? 3  -MS 3  -MS     Moving to and from a bed to a chair (including a wheelchair)? 4  -MS 3  -MS     Standing up from a chair using your arms (e.g., wheelchair, bedside chair)? 4  -MS 3  -MS     Climbing 3-5 steps with a railing? 3  -MS 3  -MS     To walk in hospital room? 4  -MS 3  -MS     AM-PAC 6 Clicks Score 22  -MS 18  -MS        Functional Assessment    Outcome Measure Options AM-PAC 6 Clicks Basic Mobility (PT)  -MS AM-PAC 6 Clicks Basic  Mobility (PT)  -MS       User Key  (r) = Recorded By, (t) = Taken By, (c) = Cosigned By    Initials Name Provider Type    MS Shai HODGE Debbie PT Physical Therapist           Time Calculation:         PT Charges     Row Name 05/15/18 0915             Time Calculation    Start Time 0832  -MS      Stop Time 0900  -MS      Time Calculation (min) 28 min  -MS      PT Received On 05/15/18  -MS        User Key  (r) = Recorded By, (t) = Taken By, (c) = Cosigned By    Initials Name Provider Type    MS Shai HODGE Debbie, PT Physical Therapist          Therapy Charges for Today     Code Description Service Date Service Provider Modifiers Qty    38411364697 HC PT EVAL LOW COMPLEXITY 1 5/14/2018 Shai Lewis, PT GP 1    96027649840 HC PT THER PROC EA 15 MIN 5/14/2018 Shai AYESHA Debbie, PT GP 1    65404897091 HC PT THER SUPP EA 15 MIN 5/14/2018 Shai Lewis, PT GP 1    14429671742 HC PT THER PROC EA 15 MIN 5/15/2018 Shai Lewis, PT GP 1    24076598744 HC PT THER PROC GROUP 5/15/2018 Shai Lewis, PT GP 1          PT G-Codes  Outcome Measure Options: AM-PAC 6 Clicks Basic Mobility (PT)    PT Discharge Summary  Anticipated Discharge Disposition (PT): home with home health care  Reason for Discharge: Discharge from facility  Outcomes Achieved: Refer to plan of care for updates on goals achieved  Discharge Destination: Home with assist, Home with home health    Shai Lewis, PT  5/15/2018

## 2018-05-15 NOTE — PLAN OF CARE
Problem: Patient Care Overview  Goal: Plan of Care Review  Outcome: Ongoing (interventions implemented as appropriate)   05/15/18 1364   Coping/Psychosocial   Plan of Care Reviewed With patient   OTHER   Outcome Summary client POD 0 LTKA. VSS, pain control achieved with prn percocet, oxycodone IR, dilaudid, and valium, intermittent nausea allevieted with prn zofran. tolerates amb in room and to BR well with assist x1, walker, and gait belt. discussed BP monitoring and parameters r/t hx HTN.    Plan of Care Review   Progress improving     Goal: Individualization and Mutuality  Outcome: Ongoing (interventions implemented as appropriate)    Goal: Discharge Needs Assessment  Outcome: Ongoing (interventions implemented as appropriate)      Problem: Fall Risk (Adult)  Goal: Absence of Fall  Outcome: Ongoing (interventions implemented as appropriate)      Problem: Knee Arthroplasty (Total, Partial) (Adult)  Goal: Signs and Symptoms of Listed Potential Problems Will be Absent, Minimized or Managed (Knee Arthroplasty)  Outcome: Ongoing (interventions implemented as appropriate)      Problem: Pain, Acute (Adult)  Goal: Acceptable Pain Control/Comfort Level  Outcome: Ongoing (interventions implemented as appropriate)

## 2018-05-15 NOTE — CONSULTS
Katharine M Mitchell  1960  female  57 y.o.    PCP: Poncho Lee MD     Consult requested by: Dr Daniels    Reason for consult:  Fever, Blood pressure and diabetes management       Date of service: 5/15/2018      HPI:   This is 57 y.o. old female admitted on 5/14/2018 and underwent left total knee arthroplasty.  Patient has a history of diabetes mellitus, hypertension and obesity.  She had a low-grade fever this morning and her pressure was high along with blood sugars which have been running slightly high.  She is on oral hypoglycemic agents and her blood pressure medicine is been restarted.  I have checked her pressure manually and her blood pressure is 140 /90.  His headache nausea vomiting.  She does has history of sleep apnea but does not use CPAP      Past Medical History:   Diagnosis Date   • Arthritis    • Diabetes mellitus     TYPE 2   • Hypertension    • Knee pain    • Sleep apnea     DOES NOT WEAR CPAP   • UTI (urinary tract infection)     TREATED FOR UTI JAN 2017     Past Surgical History:   Procedure Laterality Date   • COLONOSCOPY     • GALLBLADDER SURGERY     • JOINT REPLACEMENT     • KNEE ARTHROSCOPY Left    • PILONIDAL CYSTECTOMY     • MT TOTAL KNEE ARTHROPLASTY Right 2/20/2017    Procedure: RT TOTAL KNEE ARTHROPLASTY;  Surgeon: Lamont Morales MD;  Location: [facility];  Service: Orthopedics   • TOTAL KNEE ARTHROPLASTY Left 5/14/2018    Procedure: LEFT TOTAL KNEE ARTHROPLASTY;  Surgeon: Lamont Morales MD;  Location: [facility];  Service: Orthopedics     No current facility-administered medications on file prior to encounter.      Current Outpatient Prescriptions on File Prior to Encounter   Medication Sig Dispense Refill   • azelastine (ASTELIN) 0.1 % nasal spray 1 spray into each nostril Every Evening.     • Blood Glucose Monitoring Suppl (ONE TOUCH ULTRA 2) W/DEVICE kit      • fluticasone (FLONASE) 50 MCG/ACT nasal spray 2 sprays into each nostril Every Morning.     •  furosemide (LASIX) 20 MG tablet Take 20 mg by mouth As Needed (PRN SWELLING).     • meloxicam (MOBIC) 15 MG tablet Take 15 mg by mouth Daily. To stop before surgery     • metFORMIN (GLUCOPHAGE) 500 MG tablet Take 500 mg by mouth 2 (Two) Times a Day With Meals.     • Multiple Vitamins-Minerals (MULTIVITAMIN ADULT PO) Take 1 tablet by mouth Every Morning. To stop before surgery     • NIFEdipine XL (PROCARDIA XL) 60 MG 24 hr tablet Take 60 mg by mouth 2 (Two) Times a Day.     • Omega-3 Fatty Acids (FISH OIL) 1000 MG capsule capsule Take 1,000 mg by mouth Daily With Breakfast. TO STOP 1 WK ME SURG     • ONETOUCH DELICA LANCETS 33G misc      • sennosides-docusate sodium (SENOKOT-S) 8.6-50 MG tablet Take 2 tablets by mouth 2 (Two) Times a Day As Needed for constipation. 50 tablet 0   • spironolactone (ALDACTONE) 25 MG tablet Take 50 mg by mouth Every Morning.     • glucose blood (ONE TOUCH ULTRA TEST) test strip        Allergies as of 04/25/2018 - Reviewed 03/13/2018   Allergen Reaction Noted   • Zestril [lisinopril] Swelling 01/04/2017     Social History   Substance Use Topics   • Smoking status: Never Smoker   • Smokeless tobacco: Never Used   • Alcohol use No     Family History   Problem Relation Age of Onset   • Malig Hyperthermia Neg Hx        azelastine 1 spray Each Nare Q PM   enoxaparin 40 mg Subcutaneous Daily   fluticasone 2 spray Nasal QAM   hydrALAZINE 10 mg Intravenous Once   insulin aspart 0-9 Units Subcutaneous 4x Daily With Meals & Nightly   ketotifen 1 drop Both Eyes BID   meloxicam 15 mg Oral Daily   metFORMIN 500 mg Oral BID With Meals   multivitamin with minerals 1 tablet Oral QAM   NIFEdipine XL 60 mg Oral Q12H   sennosides-docusate sodium 2 tablet Oral BID   spironolactone 50 mg Oral QAM       Review of Systems   Constitution: Positive for fever. Negative for chills.   HENT: Negative for congestion, nosebleeds and stridor.    Eyes: Negative for blurred vision and discharge.   Cardiovascular: Negative  "for chest pain, cyanosis and leg swelling.   Respiratory: Negative for cough, hemoptysis, shortness of breath, snoring and wheezing.    Endocrine: Negative for cold intolerance and heat intolerance.   Skin: Negative for itching and rash.   Musculoskeletal: Negative for falls, neck pain and stiffness.   Gastrointestinal: Negative for diarrhea, heartburn, jaundice and vomiting.   Genitourinary: Negative for dysuria and hematuria.   Neurological: Negative for dizziness, headaches, numbness and seizures.   Psychiatric/Behavioral: Negative for depression and hallucinations. The patient does not have insomnia.        /89 (BP Location: Left arm, Patient Position: Sitting)   Pulse 105   Temp 100.3 °F (37.9 °C) (Oral) Comment: notifed RN, RN at bedside  Resp 18   Ht 157.5 cm (62\")   Wt 129 kg (284 lb)   LMP 07/01/2016   SpO2 98%   BMI 51.94 kg/m²   I/O last 3 completed shifts:  In: 3279 [P.O.:920; I.V.:2359]  Out: 1500 [Urine:1500]  I/O this shift:  In: 360 [P.O.:360]  Out: -     Physical Exam   Constitutional: She appears well-developed and well-nourished.   Obese,BMI 52   oral airway Mallampati class IV   HENT:   Head: Normocephalic and atraumatic.   Nose: No mucosal edema or nasal deformity.   Mouth/Throat: Mucous membranes are normal. No oral lesions. No lacerations. No oropharyngeal exudate.   Eyes: Conjunctivae are normal. Pupils are equal, round, and reactive to light. No scleral icterus. Right pupil is round and reactive. Left pupil is round and reactive. Pupils are equal.   Neck: Neck supple. No tracheal deviation present.   Cardiovascular: Regular rhythm, S1 normal and S2 normal.    No murmur heard.  Pulmonary/Chest: Effort normal and breath sounds normal.   Abdominal: Soft. Normal appearance and bowel sounds are normal. There is no hepatosplenomegaly.   Musculoskeletal: Normal range of motion.   Neurological: She is alert. She has normal reflexes. No cranial nerve deficit or sensory deficit.   Skin: " Skin is warm, dry and intact. No cyanosis. Nails show no clubbing.   Psychiatric: She has a normal mood and affect. Her speech is normal and behavior is normal. Judgment and thought content normal. Cognition and memory are normal.         Results from last 7 days  Lab Units 05/15/18  0320   HEMOGLOBIN g/dL 13.1   HEMATOCRIT % 38.2       Results from last 7 days  Lab Units 05/15/18  0321   SODIUM mmol/L 131*   POTASSIUM mmol/L 4.1   CHLORIDE mmol/L 95*   CO2 mmol/L 22.5   BUN mg/dL 11   CREATININE mg/dL 0.84   CALCIUM mg/dL 8.8   GLUCOSE mg/dL 198*                 Active Hospital Problems (** Indicates Principal Problem)    Diagnosis Date Noted   • **Osteoarthritis of left knee [M17.12] 05/14/2018   • Type 2 diabetes mellitus [E11.9] 05/15/2018   • Class 3 obesity due to excess calories in adult [E66.09] 05/15/2018   • CAROLE (obstructive sleep apnea) [G47.33] 05/15/2018   • Essential hypertension [I10] 01/09/2017      Resolved Hospital Problems    Diagnosis Date Noted Date Resolved   No resolved problems to display.     Patient had a low-grade fever.  I'm going to check urine and see if she has any infection.  Otherwise her blood pressure is slightly better compared to last month.  Her medications is been restarted.  Her long-term diabetes control is good and her A1c is less than 7.  I will keep her one more day and watch her to complete the workup and possible discharge in the morning if her blood pressure is normalized.  Moreover her blood pressure as to be taken manually using a large cuff.                                                          Eugenio Iverson MD, [facility]  5/15/2018 ,

## 2018-05-16 VITALS
DIASTOLIC BLOOD PRESSURE: 82 MMHG | RESPIRATION RATE: 18 BRPM | TEMPERATURE: 98.6 F | HEIGHT: 62 IN | BODY MASS INDEX: 52.26 KG/M2 | SYSTOLIC BLOOD PRESSURE: 147 MMHG | OXYGEN SATURATION: 98 % | HEART RATE: 73 BPM | WEIGHT: 284 LBS

## 2018-05-16 LAB
ANION GAP SERPL CALCULATED.3IONS-SCNC: 14.4 MMOL/L
BASOPHILS # BLD AUTO: 0.03 10*3/MM3 (ref 0–0.2)
BASOPHILS NFR BLD AUTO: 0.2 % (ref 0–1.5)
BUN BLD-MCNC: 15 MG/DL (ref 6–20)
BUN/CREAT SERPL: 16.9 (ref 7–25)
CALCIUM SPEC-SCNC: 9.3 MG/DL (ref 8.6–10.5)
CHLORIDE SERPL-SCNC: 95 MMOL/L (ref 98–107)
CO2 SERPL-SCNC: 24.6 MMOL/L (ref 22–29)
CREAT BLD-MCNC: 0.89 MG/DL (ref 0.57–1)
DEPRECATED RDW RBC AUTO: 42.8 FL (ref 37–54)
EOSINOPHIL # BLD AUTO: 0.17 10*3/MM3 (ref 0–0.7)
EOSINOPHIL NFR BLD AUTO: 1.1 % (ref 0.3–6.2)
ERYTHROCYTE [DISTWIDTH] IN BLOOD BY AUTOMATED COUNT: 13.9 % (ref 11.7–13)
GFR SERPL CREATININE-BSD FRML MDRD: 79 ML/MIN/1.73
GLUCOSE BLD-MCNC: 172 MG/DL (ref 65–99)
GLUCOSE BLDC GLUCOMTR-MCNC: 158 MG/DL (ref 70–130)
GLUCOSE BLDC GLUCOMTR-MCNC: 173 MG/DL (ref 70–130)
GLUCOSE BLDC GLUCOMTR-MCNC: 273 MG/DL (ref 70–130)
HCT VFR BLD AUTO: 36.5 % (ref 35.6–45.5)
HGB BLD-MCNC: 12.7 G/DL (ref 11.9–15.5)
IMM GRANULOCYTES # BLD: 0.07 10*3/MM3 (ref 0–0.03)
IMM GRANULOCYTES NFR BLD: 0.5 % (ref 0–0.5)
LYMPHOCYTES # BLD AUTO: 2.12 10*3/MM3 (ref 0.9–4.8)
LYMPHOCYTES NFR BLD AUTO: 14.2 % (ref 19.6–45.3)
MCH RBC QN AUTO: 29.3 PG (ref 26.9–32)
MCHC RBC AUTO-ENTMCNC: 34.8 G/DL (ref 32.4–36.3)
MCV RBC AUTO: 84.1 FL (ref 80.5–98.2)
MONOCYTES # BLD AUTO: 1.18 10*3/MM3 (ref 0.2–1.2)
MONOCYTES NFR BLD AUTO: 7.9 % (ref 5–12)
NEUTROPHILS # BLD AUTO: 11.36 10*3/MM3 (ref 1.9–8.1)
NEUTROPHILS NFR BLD AUTO: 76.1 % (ref 42.7–76)
PLATELET # BLD AUTO: 254 10*3/MM3 (ref 140–500)
PMV BLD AUTO: 10.1 FL (ref 6–12)
POTASSIUM BLD-SCNC: 4 MMOL/L (ref 3.5–5.2)
RBC # BLD AUTO: 4.34 10*6/MM3 (ref 3.9–5.2)
SODIUM BLD-SCNC: 134 MMOL/L (ref 136–145)
WBC NRBC COR # BLD: 14.93 10*3/MM3 (ref 4.5–10.7)

## 2018-05-16 PROCEDURE — 82962 GLUCOSE BLOOD TEST: CPT

## 2018-05-16 PROCEDURE — 97150 GROUP THERAPEUTIC PROCEDURES: CPT

## 2018-05-16 PROCEDURE — 97110 THERAPEUTIC EXERCISES: CPT

## 2018-05-16 PROCEDURE — 80048 BASIC METABOLIC PNL TOTAL CA: CPT | Performed by: INTERNAL MEDICINE

## 2018-05-16 PROCEDURE — 85025 COMPLETE CBC W/AUTO DIFF WBC: CPT | Performed by: INTERNAL MEDICINE

## 2018-05-16 PROCEDURE — 63710000001 INSULIN ASPART PER 5 UNITS: Performed by: ORTHOPAEDIC SURGERY

## 2018-05-16 PROCEDURE — 25010000002 ENOXAPARIN PER 10 MG: Performed by: ORTHOPAEDIC SURGERY

## 2018-05-16 RX ADMIN — ENOXAPARIN SODIUM 40 MG: 40 INJECTION SUBCUTANEOUS at 08:23

## 2018-05-16 RX ADMIN — SPIRONOLACTONE 50 MG: 50 TABLET ORAL at 08:23

## 2018-05-16 RX ADMIN — OXYCODONE HYDROCHLORIDE AND ACETAMINOPHEN 1 TABLET: 10; 325 TABLET ORAL at 18:09

## 2018-05-16 RX ADMIN — NIFEDIPINE 60 MG: 60 TABLET, FILM COATED, EXTENDED RELEASE ORAL at 08:23

## 2018-05-16 RX ADMIN — MULTIPLE VITAMINS W/ MINERALS TAB 1 TABLET: TAB at 08:23

## 2018-05-16 RX ADMIN — MELOXICAM 15 MG: 15 TABLET ORAL at 08:23

## 2018-05-16 RX ADMIN — DOCUSATE SODIUM -SENNOSIDES 2 TABLET: 50; 8.6 TABLET, COATED ORAL at 08:23

## 2018-05-16 RX ADMIN — INSULIN ASPART 2 UNITS: 100 INJECTION, SOLUTION INTRAVENOUS; SUBCUTANEOUS at 08:23

## 2018-05-16 RX ADMIN — METFORMIN HYDROCHLORIDE 500 MG: 500 TABLET ORAL at 16:51

## 2018-05-16 RX ADMIN — METFORMIN HYDROCHLORIDE 500 MG: 500 TABLET ORAL at 08:23

## 2018-05-16 RX ADMIN — INSULIN ASPART 2 UNITS: 100 INJECTION, SOLUTION INTRAVENOUS; SUBCUTANEOUS at 16:51

## 2018-05-16 RX ADMIN — OXYCODONE HYDROCHLORIDE AND ACETAMINOPHEN 1 TABLET: 10; 325 TABLET ORAL at 05:50

## 2018-05-16 RX ADMIN — OXYCODONE HYDROCHLORIDE AND ACETAMINOPHEN 1 TABLET: 10; 325 TABLET ORAL at 14:41

## 2018-05-16 RX ADMIN — FLUTICASONE PROPIONATE 2 SPRAY: 50 SPRAY, METERED NASAL at 08:24

## 2018-05-16 RX ADMIN — KETOTIFEN FUMARATE 1 DROP: 0.35 SOLUTION/ DROPS OPHTHALMIC at 08:23

## 2018-05-16 RX ADMIN — OXYCODONE HYDROCHLORIDE AND ACETAMINOPHEN 1 TABLET: 10; 325 TABLET ORAL at 10:43

## 2018-05-16 RX ADMIN — Medication 10 ML: at 04:00

## 2018-05-16 RX ADMIN — INSULIN ASPART 6 UNITS: 100 INJECTION, SOLUTION INTRAVENOUS; SUBCUTANEOUS at 11:47

## 2018-05-16 NOTE — PLAN OF CARE
Problem: Patient Care Overview  Goal: Plan of Care Review   05/16/18 1131   Coping/Psychosocial   Plan of Care Reviewed With patient   OTHER   Outcome Summary Improved tolerance to functional activity this day with an increase in gait distance and progression of ther. ex. protocol. Pt. to discharge home with HHPT this day.   Plan of Care Review   Progress improving

## 2018-05-16 NOTE — PROGRESS NOTES
Procedure(s):  LEFT TOTAL KNEE ARTHROPLASTY     LOS: 2 days     Subjective :   Doing ok.    Objective :    Vital signs in last 24 hours:  Vitals:    05/16/18 0310 05/16/18 0712 05/16/18 1100 05/16/18 1529   BP: 154/86 140/77 146/69 147/82   BP Location: Left arm Left arm     Patient Position: Lying Lying     Pulse: 92 90 82 73   Resp: 16 18 20 18   Temp: 98.8 °F (37.1 °C) 98.3 °F (36.8 °C) 97.6 °F (36.4 °C) 98.6 °F (37 °C)   TempSrc: Oral Oral Oral Oral   SpO2: 96% 95% 97% 98%   Weight:       Height:           PHYSICAL EXAM:  Patient is calm, in no acute distress, awake and oriented x 3.  Dressing is clean, dry and intact.  No signs of infection.  Swelling is appropriate in amount.  Ecchymosis is appropriate in amount.  Homans test is negative.  Patient is neurovascularly intact distally.    LABS:    Results from last 7 days  Lab Units 05/16/18  0441   WBC 10*3/mm3 14.93*   HEMOGLOBIN g/dL 12.7   HEMATOCRIT % 36.5   PLATELETS 10*3/mm3 254       Results from last 7 days  Lab Units 05/16/18  0441   SODIUM mmol/L 134*   POTASSIUM mmol/L 4.0   CHLORIDE mmol/L 95*   CO2 mmol/L 24.6   BUN mg/dL 15   CREATININE mg/dL 0.89   GLUCOSE mg/dL 172*   CALCIUM mg/dL 9.3           ASSESSMENT:  Status post Procedure(s):  LEFT TOTAL KNEE ARTHROPLASTY      Plan:  Continue Physical Therapy, increase mobility and range of motion as tolerated.  Continue SCDs, Continue Lovenox for DVT prophylaxis while inpatient.  Aspirin for home.  Dispo planning for home today.    Lamont Morales MD    Date: 5/16/2018  Time: 5:14 PM

## 2018-05-16 NOTE — PLAN OF CARE
Problem: Patient Care Overview  Goal: Plan of Care Review  Outcome: Ongoing (interventions implemented as appropriate)   05/16/18 0520   Coping/Psychosocial   Plan of Care Reviewed With patient   OTHER   Outcome Summary Blood pressure improving with restarted of medication. UA with protein noted-bacteria negative. Pain well controlled evidenced by pt ability to rest comfortably with decreased complaints of discomfort. Ambulates with walker and assist X1. Blood glucose monitored with SSI required. Peripheral vascular assessment WNL. Dressing clean dry intact. VErbalizes understanding of all educational topics to include managment of co-morbities of HTN and DM. Plan to d/c home today   Plan of Care Review   Progress improving       Problem: Fall Risk (Adult)  Goal: Absence of Fall  Outcome: Ongoing (interventions implemented as appropriate)      Problem: Knee Arthroplasty (Total, Partial) (Adult)  Goal: Signs and Symptoms of Listed Potential Problems Will be Absent, Minimized or Managed (Knee Arthroplasty)  Outcome: Ongoing (interventions implemented as appropriate)

## 2018-05-16 NOTE — PROGRESS NOTES
Name: Katharine Mitchell ADMIT: 2018   : 1960  PCP: Poncho Lee MD    MRN: [id number] LOS: 2 days   AGE/SEX: 57 y.o. female    ROOM: [facility]   Subjective   No new complaints  No fever overnight  Blood Pressure is much better      Brief hospital course since admission:  S/p  knee arthroplasty    I have reviewed past medical history, social hisotory, family history, allergies.  No changes from admission note.      Review of Systems   Constitutional: Negative for chills and fever.   Respiratory: Negative for shortness of breath.    Cardiovascular: Negative for chest pain and palpitations.          Objective   Vital Signs  Temp:  [98.3 °F (36.8 °C)-100.3 °F (37.9 °C)] 98.3 °F (36.8 °C)  Heart Rate:  [] 90  Resp:  [16-18] 18  BP: (140-180)/(77-94) 140/77  SpO2:  [90 %-98 %] 95 %  on   ;   Device (Oxygen Therapy): room air  Body mass index is 51.94 kg/m².    Intake/Output Summary (Last 24 hours) at 18 0909  Last data filed at 05/15/18 1754   Gross per 24 hour   Intake              840 ml   Output                0 ml   Net              840 ml       Physical Exam   Constitutional: She is oriented to person, place, and time. She appears well-developed and well-nourished. No distress.   HENT:   Head: Normocephalic and atraumatic.   Eyes: Pupils are equal, round, and reactive to light. No scleral icterus.   Cardiovascular: Normal rate and regular rhythm.    Pulmonary/Chest: Effort normal. No respiratory distress. She has no decreased breath sounds. She has no wheezes.   Abdominal: Soft. Bowel sounds are normal. There is no hepatosplenomegaly.   Neurological: She is alert and oriented to person, place, and time.   Skin: No cyanosis. Nails show no clubbing.   Psychiatric: She has a normal mood and affect. Her behavior is normal.       Results Review:      Results from last 7 days  Lab Units 18  0441 05/15/18  0320 18  1535   WBC 10*3/mm3 14.93*  --   --    HEMOGLOBIN g/dL 12.7 13.1  13.9   HEMATOCRIT % 36.5 38.2 39.4   PLATELETS 10*3/mm3 254  --   --        Results from last 7 days  Lab Units 05/16/18  0441 05/15/18  0321   SODIUM mmol/L 134* 131*   POTASSIUM mmol/L 4.0 4.1   CHLORIDE mmol/L 95* 95*   CO2 mmol/L 24.6 22.5   BUN mg/dL 15 11   CREATININE mg/dL 0.89 0.84   GLUCOSE mg/dL 172* 198*   CALCIUM mg/dL 9.3 8.8         Hemoglobin A1C:  Lab Results   Component Value Date    HGBA1C 6.67 (H) 05/14/2018     Glucose Range:  Glucose   Date/Time Value Ref Range Status   05/16/2018 0541 173 (H) 70 - 130 mg/dL Final   05/15/2018 2116 174 (H) 70 - 130 mg/dL Final   05/15/2018 1631 168 (H) 70 - 130 mg/dL Final   05/15/2018 1125 260 (H) 70 - 130 mg/dL Final   05/15/2018 0615 211 (H) 70 - 130 mg/dL Final   05/14/2018 2037 194 (H) 70 - 130 mg/dL Final         azelastine 1 spray Each Nare Q PM   enoxaparin 40 mg Subcutaneous Daily   fluticasone 2 spray Nasal QAM   hydrALAZINE 10 mg Intravenous Once   insulin aspart 0-9 Units Subcutaneous 4x Daily With Meals & Nightly   ketotifen 1 drop Both Eyes BID   meloxicam 15 mg Oral Daily   metFORMIN 500 mg Oral BID With Meals   multivitamin with minerals 1 tablet Oral QAM   NIFEdipine XL 60 mg Oral Q12H   sennosides-docusate sodium 2 tablet Oral BID   spironolactone 50 mg Oral QAM       lactated ringers 9 mL/hr Last Rate: 9 mL/hr (05/14/18 1054)   lactated ringers 9 mL/hr Last Rate: 9 mL/hr (05/14/18 0816)   Diet Regular; Consistent Carbohydrate  Assessment/Plan       Assessment/Plan      Active Hospital Problems (** Indicates Principal Problem)    Diagnosis Date Noted   • **Osteoarthritis of left knee [M17.12] 05/14/2018   • Type 2 diabetes mellitus [E11.9] 05/15/2018   • Class 3 obesity due to excess calories in adult [E66.09] 05/15/2018   • CAROLE (obstructive sleep apnea) [G47.33] 05/15/2018   • Essential hypertension [I10] 01/09/2017      Resolved Hospital Problems    Diagnosis Date Noted Date Resolved   No resolved problems to display.     The urine analysis  is normal.  Patient is afebrile and the blood pressure and the blood sugars are within normal range.    Patient can be discharged from medical standpoint and resume her home medications.        Eugenio Iverson MD  [facility]  05/16/18

## 2018-05-16 NOTE — THERAPY DISCHARGE NOTE
Acute Care - Physical Therapy Treatment Note/Discharge   [facility]     Patient Name: Katharine Mitchell  : 1960  MRN: [id number]  Today's Date: 2018  Onset of Illness/Injury or Date of Surgery: 18  Date of Referral to PT: 18  Referring Physician: Lamont Yang    Admit Date: 2018    Visit Dx:    ICD-10-CM ICD-9-CM   1. Difficulty walking R26.2 719.7   2. Primary osteoarthritis of left knee M17.12 715.16     Patient Active Problem List   Diagnosis   • Abnormal EKG   • Essential hypertension   • Preop cardiovascular exam   • Osteoarthritis of right knee   • Osteoarthritis of left knee   • Type 2 diabetes mellitus   • Class 3 obesity due to excess calories in adult   • CAROLE (obstructive sleep apnea)       Physical Therapy Education     Title: PT OT SLP Therapies (Done)     Topic: Physical Therapy (Done)     Point: Mobility training (Done)    Learning Progress Summary     Learner Status Readiness Method Response Comment Documented by    Patient Done Acceptance E,D VU,DU  MS 18 1130     Done Acceptance E,D VU,DU  MS 05/15/18 0914     Done Acceptance E,D VU,NR  MS 18 1444          Point: Home exercise program (Done)    Learning Progress Summary     Learner Status Readiness Method Response Comment Documented by    Patient Done Acceptance E,D VU,DU  MS 18 1130     Done Acceptance E,D VU,DU  MS 05/15/18 0914     Done Acceptance E,D VU,NR  MS 18 1444          Point: Body mechanics (Done)    Learning Progress Summary     Learner Status Readiness Method Response Comment Documented by    Patient Done Acceptance E,D VU,DU  MS 18 1130     Done Acceptance E,D VU,DU  MS 05/15/18 0914     Done Acceptance E,D VU,NR  MS 18 1444          Point: Precautions (Done)    Learning Progress Summary     Learner Status Readiness Method Response Comment Documented by    Patient Done Acceptance E,D VU,DU  MS 18 1130     Done Acceptance E,D VU,DU  MS 05/15/18 0914      Done Acceptance E,D SOTO VANCE  MS 05/14/18 1444                      User Key     Initials Effective Dates Name Provider Type Discipline    MS 04/03/18 -  Shai Lewis, PT Physical Therapist PT                Therapy Treatment        Rehabilitation Treatment Summary     Row Name 05/16/18 1129             Treatment Time/Intention    Discipline physical therapist  -MS      Document Type therapy note (daily note);discharge treatment  -MS      Subjective Information complains of;pain  -MS      Patient Effort good  -MS      Comment Pt. reports feeling better this day with continued Left knee pain/soreness.  -MS      Recorded by [MS] Shai Lewis, PT 05/16/18 1130      Row Name 05/16/18 1129             Cognitive Assessment/Intervention- PT/OT    Orientation Status (Cognition) oriented x 3  -MS      Follows Commands (Cognition) WNL  -MS      Personal Safety Interventions fall prevention program maintained;gait belt;nonskid shoes/slippers when out of bed;supervised activity  -MS      Recorded by [MS] Shai Lewis, PT 05/16/18 1130      Row Name 05/16/18 1129             Mobility Assessment/Intervention    Left Lower Extremity (Weight-bearing Status) weight-bearing as tolerated (WBAT)  -MS      Recorded by [MS] Shai Lewis, PT 05/16/18 1130      Row Name 05/16/18 1129             Bed Mobility Assessment/Treatment    Comment (Bed Mobility) Up in chair for the gym this AM.  -MS      Recorded by [MS] Shai Lewis, PT 05/16/18 1130      Row Name 05/16/18 1129             Transfer Assessment/Treatment    Sit-Stand [place] (Transfers) independent  -MS      Stand-Sit [place] (Transfers) independent  -MS      Recorded by [MS] Shai Lewis, PT 05/16/18 1130      Row Name 05/16/18 1129             Sit-Stand Transfer    Assistive Device (Sit-Stand Transfers) walker, front-wheeled  -MS      Recorded by [MS] Shai Lewis, PT 05/16/18 1130      Row Name 05/16/18 1129             Stand-Sit Transfer     Assistive Device (Stand-Sit Transfers) walker, front-wheeled  -MS      Recorded by [MS] Shai Lewis, PT 05/16/18 1130      Row Name 05/16/18 1129             Gait/Stairs Assessment/Training    [place] Level (Gait) independent  -MS      Assistive Device (Gait) walker, front-wheeled  -MS      Distance in Feet (Gait) 100 feet  -MS      Pattern (Gait) step-through  -MS      Deviations/Abnormal Patterns (Gait) antalgic  -MS      Recorded by [MS] Shai Lewis, PT 05/16/18 1130      Row Name 05/16/18 1129             Left Lower Ext    Lt Knee Extension/Flexion AROM (8, 70)  -MS      Recorded by [MS] Shai Lewis, PT 05/16/18 1130      Row Name 05/16/18 1129             Therapeutic Exercise    Comment (Therapeutic Exercise) Left TKR Protocol x 15 reps completed   -MS      Recorded by [MS] Shai Lewis, PT 05/16/18 1130      Row Name 05/16/18 1129             Positioning and Restraints    Pre-Treatment Position sitting in chair/recliner  -MS      Post Treatment Position chair  -MS      In Chair notified nsg;reclined;sitting;call light within reach;encouraged to call for assist;with family/caregiver  -MS      Recorded by [MS] Shai Lewis, PT 05/16/18 1130      Row Name 05/16/18 1129             Pain Scale: Numbers Pre/Post-Treatment    Pain Scale: Numbers, Pretreatment 4/10  -MS      Pain Scale: Numbers, Post-Treatment 4/10  -MS      Pain Location - Side Left  -MS      Pain Location knee  -MS      Pain Intervention(s) Medication (See MAR);Cold applied;Repositioned;Elevated;Rest  -MS      Recorded by [MS] Shai Lewis, PT 05/16/18 1130      Row Name                Wound 05/14/18 0935 Left knee incision    Wound - Properties Group Date first assessed: 05/14/18 [AB] Time first assessed: 0935 [AB] Side: Left [AB] Location: knee [AB] Type: incision [AB] Recorded by:  [AB] Ronen Sims RN 05/14/18 0935      User Key  (r) = Recorded By, (t) = Taken By, (c) = Cosigned By    Initials Name  Effective Dates Discipline    MS Shai Lewis, PT 04/03/18 -  PT    AB Ronen Sims, RN 01/05/18 -  Nurse        Wound 05/14/18 0935 Left knee incision (Active)   Dressing Appearance dry;intact;no drainage 5/16/2018  8:23 AM   Closure ESTELLE 5/16/2018  8:23 AM   Base dressing in place, unable to visualize 5/16/2018  8:23 AM   Drainage Amount none 5/16/2018  8:23 AM   Dressing Care, Wound other (see comments);gauze 5/16/2018  8:23 AM       PT Recommendation and Plan  Anticipated Discharge Disposition (PT): home with home health care  Planned Therapy Interventions (PT Eval): balance training, bed mobility training, gait training, home exercise program, patient/family education, postural re-education, ROM (range of motion), strengthening, transfer training  Therapy Frequency (PT Clinical Impression): 2 times/day  Outcome Summary/Treatment Plan (PT)  Anticipated Equipment Needs at Discharge (PT):  (Pt. already owns a Rwx for home use.)  Anticipated Discharge Disposition (PT): home with home health care  Plan of Care Reviewed With: patient  Progress: improving  Outcome Summary: Improved tolerance to functional activity this day with an increase in gait distance and progression of ther. ex. protocol. Pt. to discharge home with HHPT this day.          Outcome Measures     Row Name 05/16/18 1100 05/15/18 0900 05/14/18 1400       How much help from another person do you currently need...    Turning from your back to your side while in flat bed without using bedrails? 4  -MS 4  -MS 3  -MS    Moving from lying on back to sitting on the side of a flat bed without bedrails? 4  -MS 3  -MS 3  -MS    Moving to and from a bed to a chair (including a wheelchair)? 4  -MS 4  -MS 3  -MS    Standing up from a chair using your arms (e.g., wheelchair, bedside chair)? 4  -MS 4  -MS 3  -MS    Climbing 3-5 steps with a railing? 3  -MS 3  -MS 3  -MS    To walk in hospital room? 4  -MS 4  -MS 3  -MS    AM-PAC 6 Clicks Score 23  -MS 22   -MS 18  -MS       Functional Assessment    Outcome Measure Options AM-PAC 6 Clicks Basic Mobility (PT)  -MS AM-PAC 6 Clicks Basic Mobility (PT)  -MS AM-PAC 6 Clicks Basic Mobility (PT)  -MS      User Key  (r) = Recorded By, (t) = Taken By, (c) = Cosigned By    Initials Name Provider Type    MS Shai AYESHA Debbie, PT Physical Therapist           Time Calculation:         PT Charges     Row Name 05/16/18 1131             Time Calculation    Start Time 1045  -MS      Stop Time 1115  -MS      Time Calculation (min) 30 min  -MS      PT Received On 05/16/18  -MS        User Key  (r) = Recorded By, (t) = Taken By, (c) = Cosigned By    Initials Name Provider Type    MS Shai AYESHA Debbie, PT Physical Therapist          Therapy Charges for Today     Code Description Service Date Service Provider Modifiers Qty    41062628950 HC PT THER PROC EA 15 MIN 5/15/2018 Shai Lewis, PT GP 1    23647165502 HC PT THER PROC GROUP 5/15/2018 Shai Lewsi, PT GP 1    75899231019 HC PT THER PROC EA 15 MIN 5/16/2018 hSai Lewis, PT GP 1    77693005002 HC PT THER PROC GROUP 5/16/2018 Shai Lewis, PT GP 1          PT G-Codes  Outcome Measure Options: AM-PAC 6 Clicks Basic Mobility (PT)    PT Discharge Summary  Anticipated Discharge Disposition (PT): home with home health care  Reason for Discharge: Discharge from facility  Outcomes Achieved: Refer to plan of care for updates on goals achieved  Discharge Destination: Home with assist, Home with home health    Shai Lewis, PT  5/16/2018

## 2019-11-14 ENCOUNTER — HOSPITAL ENCOUNTER (OUTPATIENT)
Dept: GENERAL RADIOLOGY | Facility: HOSPITAL | Age: 59
Discharge: HOME | End: 2019-11-14
Admitting: INTERNAL MEDICINE

## 2019-11-14 DIAGNOSIS — J18.9 PNEUMONIA, UNSPECIFIED ORGANISM: ICD-10-CM

## 2019-11-14 PROCEDURE — 71046 X-RAY EXAM CHEST 2 VIEWS: CPT

## 2019-12-02 ENCOUNTER — PREP FOR SURGERY (OUTPATIENT)
Dept: OTHER | Facility: HOSPITAL | Age: 59
End: 2019-12-02

## 2019-12-02 DIAGNOSIS — Z86.010 HX OF COLONIC POLYP: ICD-10-CM

## 2019-12-02 DIAGNOSIS — Z12.11 SCREENING FOR COLON CANCER: Primary | ICD-10-CM

## 2019-12-05 PROBLEM — Z86.010 HX OF COLONIC POLYP: Status: ACTIVE | Noted: 2019-12-05

## 2019-12-05 PROBLEM — Z12.11 SCREENING FOR COLON CANCER: Status: ACTIVE | Noted: 2019-12-05

## 2019-12-05 PROBLEM — Z86.0100 HX OF COLONIC POLYP: Status: ACTIVE | Noted: 2019-12-05

## 2020-01-06 ENCOUNTER — HOSPITAL ENCOUNTER (OUTPATIENT)
Facility: HOSPITAL | Age: 60
Setting detail: HOSPITAL OUTPATIENT SURGERY
Discharge: HOME | End: 2020-01-06
Attending: SURGERY | Admitting: SURGERY

## 2020-01-06 ENCOUNTER — ANESTHESIA (OUTPATIENT)
Dept: GASTROENTEROLOGY | Facility: HOSPITAL | Age: 60
End: 2020-01-06

## 2020-01-06 ENCOUNTER — ANESTHESIA EVENT (OUTPATIENT)
Dept: GASTROENTEROLOGY | Facility: HOSPITAL | Age: 60
End: 2020-01-06

## 2020-01-06 VITALS
TEMPERATURE: 98.4 F | SYSTOLIC BLOOD PRESSURE: 166 MMHG | OXYGEN SATURATION: 96 % | DIASTOLIC BLOOD PRESSURE: 97 MMHG | BODY MASS INDEX: 52.88 KG/M2 | HEART RATE: 76 BPM | RESPIRATION RATE: 14 BRPM | HEIGHT: 62 IN | WEIGHT: 287.38 LBS

## 2020-01-06 DIAGNOSIS — Z12.11 SCREENING FOR COLON CANCER: ICD-10-CM

## 2020-01-06 DIAGNOSIS — Z86.010 HX OF COLONIC POLYP: ICD-10-CM

## 2020-01-06 PROBLEM — K63.5 POLYP OF TRANSVERSE COLON: Status: ACTIVE | Noted: 2019-12-05

## 2020-01-06 LAB — GLUCOSE BLDC GLUCOMTR-MCNC: 205 MG/DL (ref 70–130)

## 2020-01-06 PROCEDURE — 25010000002 PROPOFOL 10 MG/ML EMULSION: Performed by: NURSE ANESTHETIST, CERTIFIED REGISTERED

## 2020-01-06 PROCEDURE — 88305 TISSUE EXAM BY PATHOLOGIST: CPT | Performed by: SURGERY

## 2020-01-06 PROCEDURE — 45380 COLONOSCOPY AND BIOPSY: CPT | Performed by: SURGERY

## 2020-01-06 PROCEDURE — S0260 H&P FOR SURGERY: HCPCS | Performed by: SURGERY

## 2020-01-06 PROCEDURE — 82962 GLUCOSE BLOOD TEST: CPT

## 2020-01-06 RX ORDER — SODIUM CHLORIDE 0.9 % (FLUSH) 0.9 %
10 SYRINGE (ML) INJECTION AS NEEDED
Status: DISCONTINUED | OUTPATIENT
Start: 2020-01-06 | End: 2020-01-06 | Stop reason: HOSPADM

## 2020-01-06 RX ORDER — PROPOFOL 10 MG/ML
VIAL (ML) INTRAVENOUS CONTINUOUS PRN
Status: DISCONTINUED | OUTPATIENT
Start: 2020-01-06 | End: 2020-01-06 | Stop reason: SURG

## 2020-01-06 RX ORDER — SODIUM CHLORIDE 0.9 % (FLUSH) 0.9 %
3 SYRINGE (ML) INJECTION EVERY 12 HOURS SCHEDULED
Status: DISCONTINUED | OUTPATIENT
Start: 2020-01-06 | End: 2020-01-06 | Stop reason: HOSPADM

## 2020-01-06 RX ORDER — SODIUM CHLORIDE, SODIUM LACTATE, POTASSIUM CHLORIDE, CALCIUM CHLORIDE 600; 310; 30; 20 MG/100ML; MG/100ML; MG/100ML; MG/100ML
30 INJECTION, SOLUTION INTRAVENOUS CONTINUOUS PRN
Status: DISCONTINUED | OUTPATIENT
Start: 2020-01-06 | End: 2020-01-06 | Stop reason: HOSPADM

## 2020-01-06 RX ADMIN — PROPOFOL 140 MCG/KG/MIN: 10 INJECTION, EMULSION INTRAVENOUS at 12:07

## 2020-01-06 RX ADMIN — SODIUM CHLORIDE, POTASSIUM CHLORIDE, SODIUM LACTATE AND CALCIUM CHLORIDE 30 ML/HR: 600; 310; 30; 20 INJECTION, SOLUTION INTRAVENOUS at 11:28

## 2020-01-06 NOTE — ANESTHESIA POSTPROCEDURE EVALUATION
Patient: Katharine Mitchell    Procedure Summary     Date:  01/06/20 Room / Location:  [facility] ENDOSCOPY 10 / [facility] ENDOSCOPY    Anesthesia Start:  1201 Anesthesia Stop:  1253    Procedure:  COLONOSCOPY TO CECUM WITH COLD BIOPSY POLYPECTOMY AND 2 ML SALINE LIFT INJECTION (N/A ) Diagnosis:       Screening for colon cancer      Hx of colonic polyp      (Screening for colon cancer [Z12.11])      (Hx of colonic polyp [Z86.010])    Surgeon:  Eva Rizvi MD Provider:  Edilma Aguirre MD    Anesthesia Type:  MAC ASA Status:  3          Anesthesia Type: MAC    Vitals  Vitals Value Taken Time   /97 1/6/2020  1:11 PM   Temp 36.9 °C (98.4 °F) 1/6/2020 12:52 PM   Pulse 76 1/6/2020  1:11 PM   Resp 14 1/6/2020  1:11 PM   SpO2 96 % 1/6/2020  1:11 PM           Post Anesthesia Care and Evaluation    Patient location during evaluation: bedside  Patient participation: complete - patient participated  Level of consciousness: awake  Pain management: adequate  Airway patency: patent  Anesthetic complications: No anesthetic complications    Cardiovascular status: acceptable  Respiratory status: acceptable  Hydration status: acceptable

## 2020-01-06 NOTE — OP NOTE
Operative Note :  Eva Rizvi MD      Katharine Mitchell  1960    Procedure Date: 01/06/20    Pre-op Diagnosis:  Screening for colon cancer [Z12.11]  Hx of colonic polyp [Z86.010]    Post-Operative Diagnosis:  Colon polyp  Diverticulosis  Grade 1 internal hemorrhoids    Procedure:   Flexible colonoscopy to the cecum with saline lift and piecemeal cold forcep polypectomy of transverse colon polyp    Surgeon: Eva Rizvi MD    Assistant: None    Anesthesia:  MAC (monitored anesthetic care)    Estimated Blood Loss: Minimal    Specimens: Transverse colon polyp    Complications: None    Indications:  Ms. Mitchell is a 59-year-old lady who underwent a screening colonoscopy 5 years ago by Dr. Trevor Khanna at [facility] where a single benign polyp was found of the transverse colon.  There were no adenomatous changes identified within the pathologic specimen.  It was recommended she undergo a repeat colonoscopy in 5 years, and she presents today for this procedure.  She has been counseled on the risks of the procedure to include bleeding, possible colon perforation, and possible missed pathology.  Despite these risks, she has consented to proceed.    Findings: Single sessile colon polyp measuring 7 mm within the transverse colon removed using a saline lift and cold forcep piecemeal polypectomy technique, single diverticula of the proximal transverse colon showing no signs of bleeding or fecal impaction, nonbleeding grade 1 internal hemorrhoids    Description of procedure:  The patient was brought to the endoscopy suite and brent in the left lateral decubitus position.  Continuous propofol anesthesia was administered.  A surgical timeout was completed.  A digital rectal exam was performed, revealing no abnormalities.  An adult colonoscope was then inserted through the anus and passed under direct visualization to the level of the cecum.  The cecum was identified via the ileocecal valve as well as the  appendiceal orifice.  The scope was then slowly withdrawn, examining all circumferential walls of the ascending, transverse, descending, and sigmoid colon.  There was a single small diverticula of the proximal transverse colon near the hepatic flexure that showed no signs of bleeding or fecal impaction.  There was a sessile 7 mm transverse colon polyp along the midportion of the transverse colon that I initially tried to remove using a hot snare and saline lift technique.  The hot snare could not achieve an adequate seal around the base of the polyp so I simply removed it using the cold biopsy forceps in a piecemeal fashion.  The polyp specimen components were passed off to pathology in formalin.  The base of the polypectomy appeared hemostatic.  Within the rectum, the scope was retroflexed showing grade 1 nonbleeding internal hemorrhoids.  The scope was then withdrawn and the colon desufflated.  The patient had a very good bowel prep and was transferred to the recovery area in stable condition.     Recommendations:  I will call the patient in 1 week or less with the pathology results of the one polyp removed as this will determine when the next screening colonoscopy will be due.    Eva Rizvi MD  General and Endoscopic Surgery  [facility]    [address], [address]  [place], [place], [address]  P: [phone]  F: [phone]

## 2020-01-06 NOTE — H&P
General Surgery  History and Physical    CC: History of colon polyps, screening for colon cancer    HPI: The patient is a pleasant 59 y.o. year-old lady who presents today for a repeat screening colonoscopy.  Her last colonoscopy was done in 2015 by Dr. Trevor Khanna and significant for a single polyp of the transverse colon with pathology reporting it is a prolapse-type polyp.  She denies any melena or hematochezia and has no family history of colon cancer.    Past Medical History:   Hypertension  Sleep apnea  Type 2 diabetes  Osteoarthritis    Past Surgical History:   Cholecystectomy  Left knee arthroscopy  Bilateral knee arthroplasty  Pilonidal cystectomy  Colonoscopy (2015, Dr. Khanna)    Medications:   Medications Prior to Admission   Medication Sig Dispense Refill Last Dose   • azelastine (ASTELIN) 0.1 % nasal spray 1 spray into each nostril Every Evening.   1/5/2020 at Unknown time   • fluticasone (FLONASE) 50 MCG/ACT nasal spray 2 sprays into each nostril Every Morning.   1/5/2020 at Unknown time   • furosemide (LASIX) 20 MG tablet Take 20 mg by mouth As Needed (PRN SWELLING).   1/5/2020 at Unknown time   • ketotifen (ZADITOR) 0.025 % ophthalmic solution Administer 1 drop to both eyes 2 (Two) Times a Day.   1/5/2020 at Unknown time   • metFORMIN (GLUCOPHAGE) 500 MG tablet Take 500 mg by mouth 2 (Two) Times a Day With Meals.   1/5/2020 at Unknown time   • Multiple Vitamins-Minerals (MULTIVITAMIN ADULT PO) Take 1 tablet by mouth Every Morning. To stop before surgery   1/5/2020 at Unknown time   • NIFEdipine XL (PROCARDIA XL) 60 MG 24 hr tablet Take 60 mg by mouth 2 (Two) Times a Day.   1/6/2020 at Unknown time   • spironolactone (ALDACTONE) 25 MG tablet Take 50 mg by mouth Every Morning.   1/5/2020 at Unknown time   • Blood Glucose Monitoring Suppl (ONE TOUCH ULTRA 2) W/DEVICE kit    4/14/2018   • glucose blood (ONE TOUCH ULTRA TEST) test strip    2/6/2017 at Unknown time   • meloxicam (MOBIC) 15 MG tablet Take  15 mg by mouth Daily. To stop before surgery   More than a month at Unknown time   • Omega-3 Fatty Acids (FISH OIL) 1000 MG capsule capsule Take 1,000 mg by mouth Daily With Breakfast. TO STOP 1 WK SD SURG   More than a month at Unknown time   • ONETOUCH DELICA LANCETS 33G misc    2/6/2017   • sennosides-docusate sodium (SENOKOT-S) 8.6-50 MG tablet Take 2 tablets by mouth 2 (Two) Times a Day As Needed for constipation. 50 tablet 0 More than a month at Unknown time       Allergies: Lisinopril (angioedema)    Family History: Mother with breast cancer, brother with prostate cancer    Social History: Retired, single, non-smoker, no regular alcohol use    ROS: A comprehensive review of systems was conducted and significant only for the following positives: Eye itching, eye redness, sleep apnea, cough, wheezing, congestion, ear pain, runny nose, sinus pressure, sneezing, sore throat, joint pain, back pain, headaches, and environmental allergies  All other systems reviewed and negative    Physical Exam:  Vitals:    01/06/20 1120   BP: 165/82   Pulse: 93   Resp: 16   Temp: 98.6   SpO2: 96% on room air     Height: 157 cm  Weight: 287.6 lbs  BMI: 52  General: No acute distress, well-nourished & well-developed  HEAD: normocephalic, atraumatic  EYES: normal conjunctiva, sclera anicteric  EARS: grossly normal hearing  NECK: supple, no thyromegaly  CARDIOVASCULAR: regular rate and rhythm  RESPIRATORY: clear to auscultation bilaterally  GASTROINTESTINAL: soft, nontender, non-distended  PSYCHIATRIC: oriented x3, normal mood and affect    ASSESSMENT & PLAN  Ms. Mitchell is a 59-year-old lady here for a repeat screening colonoscopy given a history of colon polyps.  She has been counseled on the risks of the procedure to include bleeding, possible colon perforation, and possible missed pathology.  Despite these risks, she has consented to proceed.    Eva Rizvi MD  General and Endoscopic Surgery  [facility]    [address], [address]  [place], [place], [address]  P: [phone]  F: [phone]

## 2020-01-06 NOTE — ANESTHESIA PREPROCEDURE EVALUATION
Anesthesia Evaluation     NPO Solid Status: > 8 hours             Airway   Mallampati: III  TM distance: >3 FB  Neck ROM: full  No difficulty expected  Dental - normal exam     Pulmonary - normal exam   (+) sleep apnea,   Cardiovascular - normal exam    (+) hypertension,       Neuro/Psych  GI/Hepatic/Renal/Endo    (+) obesity, morbid obesity,  diabetes mellitus type 2,     Musculoskeletal     Abdominal    Substance History      OB/GYN          Other   arthritis,                      Anesthesia Plan    ASA 3     MAC     intravenous induction     Anesthetic plan, all risks, benefits, and alternatives have been provided, discussed and informed consent has been obtained with: patient.

## 2020-01-07 ENCOUNTER — TELEPHONE (OUTPATIENT)
Dept: SURGERY | Facility: CLINIC | Age: 60
End: 2020-01-07

## 2020-01-07 LAB
CYTO UR: NORMAL
LAB AP CASE REPORT: NORMAL
PATH REPORT.FINAL DX SPEC: NORMAL
PATH REPORT.GROSS SPEC: NORMAL

## 2020-01-07 NOTE — TELEPHONE ENCOUNTER
----- Message from Eva Rizvi MD sent at 1/7/2020  1:01 PM EST -----  Blanca, could you please call Mrs. Mitchell and let her know that the single polyp removed during her colonoscopy returned benign?  She will require a repeat screening colonoscopy in 5 years to ensure that no new polyps have developed.  Please update her health maintenance tab and recall pool.

## 2020-09-10 ENCOUNTER — TELEPHONE (OUTPATIENT)
Dept: FAMILY MEDICINE CLINIC | Facility: CLINIC | Age: 60
End: 2020-09-10

## 2020-09-10 NOTE — TELEPHONE ENCOUNTER
Left voice message to schedule her appointment. Please advice HUB. And if she calls back please schedule her appointment.

## 2020-09-15 ENCOUNTER — OFFICE VISIT (OUTPATIENT)
Dept: FAMILY MEDICINE CLINIC | Facility: CLINIC | Age: 60
End: 2020-09-15

## 2020-09-15 VITALS
SYSTOLIC BLOOD PRESSURE: 124 MMHG | HEIGHT: 62 IN | DIASTOLIC BLOOD PRESSURE: 78 MMHG | TEMPERATURE: 97.6 F | RESPIRATION RATE: 16 BRPM | WEIGHT: 293 LBS | OXYGEN SATURATION: 98 % | BODY MASS INDEX: 53.92 KG/M2 | HEART RATE: 92 BPM

## 2020-09-15 DIAGNOSIS — G47.33 OSA (OBSTRUCTIVE SLEEP APNEA): ICD-10-CM

## 2020-09-15 DIAGNOSIS — I10 ESSENTIAL HYPERTENSION: Primary | ICD-10-CM

## 2020-09-15 DIAGNOSIS — E11.65 TYPE 2 DIABETES MELLITUS WITH HYPERGLYCEMIA, WITHOUT LONG-TERM CURRENT USE OF INSULIN (HCC): ICD-10-CM

## 2020-09-15 DIAGNOSIS — E66.01 MORBID (SEVERE) OBESITY DUE TO EXCESS CALORIES (HCC): ICD-10-CM

## 2020-09-15 DIAGNOSIS — Z91.199 NONCOMPLIANCE: ICD-10-CM

## 2020-09-15 LAB
ALBUMIN SERPL-MCNC: 4.6 G/DL (ref 3.5–5.2)
ALBUMIN/GLOB SERPL: 1.8 G/DL
ALP SERPL-CCNC: 83 U/L (ref 39–117)
ALT SERPL-CCNC: 22 U/L (ref 1–33)
AST SERPL-CCNC: 19 U/L (ref 1–32)
BASOPHILS # BLD AUTO: 0.07 10*3/MM3 (ref 0–0.2)
BASOPHILS NFR BLD AUTO: 0.6 % (ref 0–1.5)
BILIRUB SERPL-MCNC: 0.4 MG/DL (ref 0–1.2)
BUN SERPL-MCNC: 25 MG/DL (ref 8–23)
BUN/CREAT SERPL: 24 (ref 7–25)
CALCIUM SERPL-MCNC: 10.1 MG/DL (ref 8.6–10.5)
CHLORIDE SERPL-SCNC: 103 MMOL/L (ref 98–107)
CO2 SERPL-SCNC: 24.2 MMOL/L (ref 22–29)
CREAT SERPL-MCNC: 1.04 MG/DL (ref 0.57–1)
EOSINOPHIL # BLD AUTO: 0.48 10*3/MM3 (ref 0–0.4)
EOSINOPHIL NFR BLD AUTO: 4.4 % (ref 0.3–6.2)
ERYTHROCYTE [DISTWIDTH] IN BLOOD BY AUTOMATED COUNT: 14.8 % (ref 12.3–15.4)
EXPIRATION DATE: NORMAL
GLOBULIN SER CALC-MCNC: 2.6 GM/DL
GLUCOSE SERPL-MCNC: 196 MG/DL (ref 65–99)
HBA1C MFR BLD: 8.1 %
HCT VFR BLD AUTO: 43.8 % (ref 34–46.6)
HGB BLD-MCNC: 15 G/DL (ref 12–15.9)
IMM GRANULOCYTES # BLD AUTO: 0.08 10*3/MM3 (ref 0–0.05)
IMM GRANULOCYTES NFR BLD AUTO: 0.7 % (ref 0–0.5)
LYMPHOCYTES # BLD AUTO: 2.62 10*3/MM3 (ref 0.7–3.1)
LYMPHOCYTES NFR BLD AUTO: 24.1 % (ref 19.6–45.3)
Lab: NORMAL
MCH RBC QN AUTO: 29.1 PG (ref 26.6–33)
MCHC RBC AUTO-ENTMCNC: 34.2 G/DL (ref 31.5–35.7)
MCV RBC AUTO: 85 FL (ref 79–97)
MONOCYTES # BLD AUTO: 0.61 10*3/MM3 (ref 0.1–0.9)
MONOCYTES NFR BLD AUTO: 5.6 % (ref 5–12)
NEUTROPHILS # BLD AUTO: 6.99 10*3/MM3 (ref 1.7–7)
NEUTROPHILS NFR BLD AUTO: 64.6 % (ref 42.7–76)
NRBC BLD AUTO-RTO: 0 /100 WBC (ref 0–0.2)
PLATELET # BLD AUTO: 326 10*3/MM3 (ref 140–450)
POTASSIUM SERPL-SCNC: 4.5 MMOL/L (ref 3.5–5.2)
PROT SERPL-MCNC: 7.2 G/DL (ref 6–8.5)
RBC # BLD AUTO: 5.15 10*6/MM3 (ref 3.77–5.28)
SODIUM SERPL-SCNC: 139 MMOL/L (ref 136–145)
WBC # BLD AUTO: 10.85 10*3/MM3 (ref 3.4–10.8)

## 2020-09-15 PROCEDURE — 99214 OFFICE O/P EST MOD 30 MIN: CPT | Performed by: INTERNAL MEDICINE

## 2020-09-15 PROCEDURE — 83036 HEMOGLOBIN GLYCOSYLATED A1C: CPT | Performed by: INTERNAL MEDICINE

## 2020-09-15 RX ORDER — ALLOPURINOL 100 MG/1
100 TABLET ORAL 2 TIMES DAILY
COMMUNITY
Start: 2020-08-28 | End: 2020-09-29 | Stop reason: SDUPTHER

## 2020-09-15 RX ORDER — ROSUVASTATIN CALCIUM 10 MG/1
TABLET, COATED ORAL
COMMUNITY
Start: 2020-08-28 | End: 2021-03-30 | Stop reason: SDUPTHER

## 2020-09-15 RX ORDER — FUROSEMIDE 40 MG/1
40 TABLET ORAL DAILY
COMMUNITY
End: 2020-09-30

## 2020-09-15 RX ORDER — SITAGLIPTIN 50 MG/1
TABLET, FILM COATED ORAL
COMMUNITY
Start: 2020-09-11 | End: 2021-01-28

## 2020-09-24 ENCOUNTER — TELEPHONE (OUTPATIENT)
Dept: FAMILY MEDICINE CLINIC | Facility: CLINIC | Age: 60
End: 2020-09-24

## 2020-09-24 NOTE — TELEPHONE ENCOUNTER
Patient called in and stated she would like to know and have a better explanation of her lab results . Please call her at [phone]

## 2020-09-29 ENCOUNTER — OFFICE VISIT (OUTPATIENT)
Dept: FAMILY MEDICINE CLINIC | Facility: CLINIC | Age: 60
End: 2020-09-29

## 2020-09-29 VITALS
DIASTOLIC BLOOD PRESSURE: 84 MMHG | BODY MASS INDEX: 53.92 KG/M2 | HEART RATE: 92 BPM | TEMPERATURE: 96.8 F | HEIGHT: 62 IN | RESPIRATION RATE: 16 BRPM | WEIGHT: 293 LBS | OXYGEN SATURATION: 94 % | SYSTOLIC BLOOD PRESSURE: 134 MMHG

## 2020-09-29 DIAGNOSIS — M1A.0710 CHRONIC GOUT OF RIGHT FOOT, UNSPECIFIED CAUSE: ICD-10-CM

## 2020-09-29 DIAGNOSIS — E66.01 MORBID (SEVERE) OBESITY DUE TO EXCESS CALORIES (HCC): ICD-10-CM

## 2020-09-29 DIAGNOSIS — I10 ESSENTIAL HYPERTENSION: ICD-10-CM

## 2020-09-29 DIAGNOSIS — E11.65 TYPE 2 DIABETES MELLITUS WITH HYPERGLYCEMIA, WITHOUT LONG-TERM CURRENT USE OF INSULIN (HCC): Primary | ICD-10-CM

## 2020-09-29 PROCEDURE — 90686 IIV4 VACC NO PRSV 0.5 ML IM: CPT | Performed by: INTERNAL MEDICINE

## 2020-09-29 PROCEDURE — 99214 OFFICE O/P EST MOD 30 MIN: CPT | Performed by: INTERNAL MEDICINE

## 2020-09-29 PROCEDURE — 90471 IMMUNIZATION ADMIN: CPT | Performed by: INTERNAL MEDICINE

## 2020-09-29 RX ORDER — ALLOPURINOL 100 MG/1
100 TABLET ORAL 2 TIMES DAILY
Qty: 60 TABLET | Refills: 3 | Status: SHIPPED | OUTPATIENT
Start: 2020-09-29 | End: 2021-01-13

## 2020-09-29 NOTE — PROGRESS NOTES
09/29/2020    CC: Hypertension (follow up) and Diabetes (follow up)  .        HPI  Hypertension  This is a chronic problem. The current episode started more than 1 year ago. The problem has been gradually improving since onset. The problem is controlled. Agents associated with hypertension include steroids. Risk factors for coronary artery disease include obesity. Past treatments include ACE inhibitors and calcium channel blockers. Current antihypertension treatment includes ACE inhibitors and angiotensin blockers.        Ilene Mitchell is a 60 y.o. female.      The following portions of the patient's history were reviewed and updated as appropriate: allergies, current medications, past family history, past medical history, past social history, past surgical history and problem list.    Problem List  Patient Active Problem List   Diagnosis   • Abnormal EKG   • Essential hypertension   • Preop cardiovascular exam   • Osteoarthritis of right knee   • Osteoarthritis of left knee   • Type 2 diabetes mellitus (CMS/HCC)   • Class 3 obesity due to excess calories in adult   • CAROLE (obstructive sleep apnea)   • Screening for colon cancer   • Polyp of transverse colon       Past Medical History  Past Medical History:   Diagnosis Date   • Arthritis    • Diabetes mellitus (CMS/HCC)     TYPE 2   • Hypertension    • Knee pain    • Sleep apnea     DOES NOT WEAR CPAP   • UTI (urinary tract infection)     TREATED FOR UTI JAN 2017       Surgical History  Past Surgical History:   Procedure Laterality Date   • COLONOSCOPY     • COLONOSCOPY N/A 1/6/2020    Procedure: COLONOSCOPY TO CECUM WITH COLD BIOPSY POLYPECTOMY AND 2 ML SALINE LIFT INJECTION;  Surgeon: Eva Rizvi MD;  Location: [facility] ENDOSCOPY;  Service: General   • GALLBLADDER SURGERY     • JOINT REPLACEMENT     • KNEE ARTHROSCOPY Left    • PILONIDAL CYSTECTOMY     • TX TOTAL KNEE ARTHROPLASTY Right 2/20/2017    Procedure: RT TOTAL KNEE ARTHROPLASTY;   Surgeon: Lamont Morales MD;  Location: [facility] MAIN OR;  Service: Orthopedics   • TOTAL KNEE ARTHROPLASTY Left 5/14/2018    Procedure: LEFT TOTAL KNEE ARTHROPLASTY;  Surgeon: Lamont Morales MD;  Location: [facility] OR;  Service: Orthopedics       Family History  Family History   Problem Relation Age of Onset   • Malig Hyperthermia Neg Hx        Social History  Social History    Tobacco Use      Smoking status: Never Smoker      Smokeless tobacco: Never Used       Is the Patient a current tobacco user? Yes Katharine Mitchell  reports that she has never smoked. She has never used smokeless tobacco.. I have educated her on the risk of diseases from using tobacco products such as cancer and heart diease.     I advised her to quit and she is willing to quit. We have discussed the following method/s for tobacco cessation:  Education Material.  Together we have set a quit date for 1 week from today.  She will follow up with me in 1 day or sooner to check on her progress.    I spent 3  minutes counseling the patient.         Allergies  Allergies   Allergen Reactions   • Zestril [Lisinopril] Swelling     LIP/ MOUTH SWELLING       Current Medications    Current Outpatient Medications:   •  allopurinol (ZYLOPRIM) 100 MG tablet, Take 100 mg by mouth 2 (Two) Times a Day., Disp: , Rfl:   •  azelastine (ASTELIN) 0.1 % nasal spray, 1 spray into each nostril Every Evening., Disp: , Rfl:   •  Blood Glucose Monitoring Suppl (ONE TOUCH ULTRA 2) W/DEVICE kit, , Disp: , Rfl:   •  Breo Ellipta 100-25 MCG/INH inhaler, INL 1 PUFF PO DAILY. RM AFTER U, Disp: , Rfl:   •  fluticasone (FLONASE) 50 MCG/ACT nasal spray, 2 sprays into each nostril Every Morning., Disp: , Rfl:   •  furosemide (LASIX) 20 MG tablet, Take 20 mg by mouth As Needed (PRN SWELLING)., Disp: , Rfl:   •  furosemide (LASIX) 40 MG tablet, Take 40 mg by mouth Daily., Disp: , Rfl:   •  glucose blood (ONE TOUCH ULTRA TEST) test strip, , Disp: , Rfl:   •  Januvia 50 MG tablet, TK  1 T PO QAM, Disp: , Rfl:   •  ketotifen (ZADITOR) 0.025 % ophthalmic solution, Administer 1 drop to both eyes 2 (Two) Times a Day., Disp: , Rfl:   •  metFORMIN (GLUCOPHAGE) 500 MG tablet, Take 500 mg by mouth 2 (Two) Times a Day With Meals., Disp: , Rfl:   •  Multiple Vitamins-Minerals (MULTIVITAMIN ADULT PO), Take 1 tablet by mouth Every Morning. To stop before surgery, Disp: , Rfl:   •  NIFEdipine XL (PROCARDIA XL) 60 MG 24 hr tablet, Take 60 mg by mouth 2 (Two) Times a Day., Disp: , Rfl:   •  ONETOUCH DELICA LANCETS 33G misc, , Disp: , Rfl:   •  rosuvastatin (CRESTOR) 10 MG tablet, TK 1 T PO Q 3 DAYS, Disp: , Rfl:   •  spironolactone (ALDACTONE) 25 MG tablet, Take 50 mg by mouth Every Morning., Disp: , Rfl:   •  meloxicam (MOBIC) 15 MG tablet, Take 15 mg by mouth Daily. To stop before surgery, Disp: , Rfl:   •  Omega-3 Fatty Acids (FISH OIL) 1000 MG capsule capsule, Take 1,000 mg by mouth Daily With Breakfast. TO STOP 1 WK [place] SURG, Disp: , Rfl:   •  sennosides-docusate sodium (SENOKOT-S) 8.6-50 MG tablet, Take 2 tablets by mouth 2 (Two) Times a Day As Needed for constipation., Disp: 50 tablet, Rfl: 0     Review of System  Review of Systems   Constitutional: Negative.    HENT: Negative.    Eyes: Negative.    Respiratory: Negative.    Skin: Negative.      I have reviewed and confirmed the accuracy of the ROS as documented by the MA/LPN/RN Poncho Lee MD    Vitals:    09/29/20 0932   BP: 120/82   Pulse: 92   Resp: 16   Temp: 96.8 °F (36 °C)   SpO2: 94%     Body mass index is 54.91 kg/m².    Objective     Office Visit on 09/15/2020   Component Date Value Ref Range Status   • Glucose 09/15/2020 196* 65 - 99 mg/dL Final   • BUN 09/15/2020 25* 8 - 23 mg/dL Final   • Creatinine 09/15/2020 1.04* 0.57 - 1.00 mg/dL Final   • eGFR Non African Am 09/15/2020 54* >60 mL/min/1.73 Final   • eGFR African Am 09/15/2020 65  >60 mL/min/1.73 Final   • BUN/Creatinine Ratio 09/15/2020 24.0  7.0 - 25.0 Final   • Sodium 09/15/2020  139  136 - 145 mmol/L Final   • Potassium 09/15/2020 4.5  3.5 - 5.2 mmol/L Final   • Chloride 09/15/2020 103  98 - 107 mmol/L Final   • Total CO2 09/15/2020 24.2  22.0 - 29.0 mmol/L Final   • Calcium 09/15/2020 10.1  8.6 - 10.5 mg/dL Final   • Total Protein 09/15/2020 7.2  6.0 - 8.5 g/dL Final   • Albumin 09/15/2020 4.60  3.50 - 5.20 g/dL Final   • Globulin 09/15/2020 2.6  gm/dL Final   • A/G Ratio 09/15/2020 1.8  g/dL Final   • Total Bilirubin 09/15/2020 0.4  0.0 - 1.2 mg/dL Final   • Alkaline Phosphatase 09/15/2020 83  39 - 117 U/L Final   • AST (SGOT) 09/15/2020 19  1 - 32 U/L Final   • ALT (SGPT) 09/15/2020 22  1 - 33 U/L Final   • WBC 09/15/2020 10.85* 3.40 - 10.80 10*3/mm3 Final   • RBC 09/15/2020 5.15  3.77 - 5.28 10*6/mm3 Final   • Hemoglobin 09/15/2020 15.0  12.0 - 15.9 g/dL Final   • Hematocrit 09/15/2020 43.8  34.0 - 46.6 % Final   • MCV 09/15/2020 85.0  79.0 - 97.0 fL Final   • MCH 09/15/2020 29.1  26.6 - 33.0 pg Final   • MCHC 09/15/2020 34.2  31.5 - 35.7 g/dL Final   • RDW 09/15/2020 14.8  12.3 - 15.4 % Final   • Platelets 09/15/2020 326  140 - 450 10*3/mm3 Final   • Neutrophil Rel % 09/15/2020 64.6  42.7 - 76.0 % Final   • Lymphocyte Rel % 09/15/2020 24.1  19.6 - 45.3 % Final   • Monocyte Rel % 09/15/2020 5.6  5.0 - 12.0 % Final   • Eosinophil Rel % 09/15/2020 4.4  0.3 - 6.2 % Final   • Basophil Rel % 09/15/2020 0.6  0.0 - 1.5 % Final   • Neutrophils Absolute 09/15/2020 6.99  1.70 - 7.00 10*3/mm3 Final   • Lymphocytes Absolute 09/15/2020 2.62  0.70 - 3.10 10*3/mm3 Final   • Monocytes Absolute 09/15/2020 0.61  0.10 - 0.90 10*3/mm3 Final   • Eosinophils Absolute 09/15/2020 0.48* 0.00 - 0.40 10*3/mm3 Final   • Basophils Absolute 09/15/2020 0.07  0.00 - 0.20 10*3/mm3 Final   • Immature Granulocyte Rel % 09/15/2020 0.7* 0.0 - 0.5 % Final   • Immature Grans Absolute 09/15/2020 0.08* 0.00 - 0.05 10*3/mm3 Final   • nRBC 09/15/2020 0.0  0.0 - 0.2 /100 WBC Final   • Hemoglobin A1C 09/15/2020 8.1  % Final   •  Lot Number 09/15/2020 [id number]   Final   • Expiration Date 09/15/2020 02-   Final       Physical Exam  Physical Exam  Constitutional:       Appearance: Normal appearance.   HENT:      Head: Atraumatic.   Eyes:      Extraocular Movements: Extraocular movements intact.      Pupils: Pupils are equal, round, and reactive to light.   Cardiovascular:      Rate and Rhythm: Normal rate and regular rhythm.   Pulmonary:      Breath sounds: Normal breath sounds.   Neurological:      Mental Status: She is alert.         Assessment/Plan      This patient presents today for follow-up of hypertension.  With her last visit her blood pressure was elevated but she had just taken her blood pressure medicine just prior to the visit and she had had a high sodium meal the night before.  In the interim of visits an arrangement has been made for her to see  because of the elevated blood pressure however she relates that she cannot get into see him for another 2 months or so.  On recheck today her blood pressure is improved at 138/80 in the left arm sitting position standard cuff.  She relates she took all of her medication today as prescribed.    Patient has a history of diabetes mellitus type 2.  Her last hemoglobin A1c was 8.12 weeks or so ago.  I reviewed her glucometer today which shows a 7-day average of 214-day average of 212 on a 30-day average of 200.    Patient is been treated for gouty arthritis in the past and she relates indeed a new flareup in her right hallux.  She is currently taking allopurinol only 100 mg twice a day.  Examination of the right hallux showed it is not red nor inflamed but is only slightly tender.  I feel that this is the initial stage of a acute gouty arthritis attack.    Remote obesity is unchanged.    Plan:  1.)  Increase allopurinol to 200 mg twice a day  2.)  Continue antihypertensive regimen  3.)  Increase Janumet to 100 mg p.o. daily  4.)  Follow-up in 3 months for recheck of diabetes and  blood pressure.  5.)  Patient is urged to strictly adhere to her low-carb calorie diet.  6.)  We will give her influenza shot today.2        There are no diagnoses linked to this encounter.         Poncho Lee MD  09/29/2020

## 2020-10-01 NOTE — PROGRESS NOTES
09/15/2020    CC: Hypertension and Follow-up  .        HPI  Hypertension  This is a chronic problem. The current episode started more than 1 year ago. The problem has been gradually improving since onset. The problem is controlled. There are no associated agents to hypertension. Risk factors for coronary artery disease include obesity and sedentary lifestyle. Past treatments include ACE inhibitors and calcium channel blockers. Current antihypertension treatment includes calcium channel blockers. Compliance problems include diet.    Diabetes         Subjective   Katharine Mitchell is a 60 y.o. female.      The following portions of the patient's history were reviewed and updated as appropriate: allergies, current medications, past family history, past medical history, past social history, past surgical history and problem list.    Problem List  Patient Active Problem List   Diagnosis   • Abnormal EKG   • Essential hypertension   • Preop cardiovascular exam   • Osteoarthritis of right knee   • Osteoarthritis of left knee   • Type 2 diabetes mellitus (CMS/HCC)   • Class 3 obesity due to excess calories in adult   • CAROLE (obstructive sleep apnea)   • Screening for colon cancer   • Polyp of transverse colon       Past Medical History  Past Medical History:   Diagnosis Date   • Arthritis    • Diabetes mellitus (CMS/HCC)     TYPE 2   • Hypertension    • Knee pain    • Sleep apnea     DOES NOT WEAR CPAP   • UTI (urinary tract infection)     TREATED FOR UTI JAN 2017       Surgical History  Past Surgical History:   Procedure Laterality Date   • COLONOSCOPY     • COLONOSCOPY N/A 1/6/2020    Procedure: COLONOSCOPY TO CECUM WITH COLD BIOPSY POLYPECTOMY AND 2 ML SALINE LIFT INJECTION;  Surgeon: Eva Rizvi MD;  Location: [facility] ENDOSCOPY;  Service: General   • GALLBLADDER SURGERY     • JOINT REPLACEMENT     • KNEE ARTHROSCOPY Left    • PILONIDAL CYSTECTOMY     • OR TOTAL KNEE ARTHROPLASTY Right 2/20/2017    Procedure: RT  TOTAL KNEE ARTHROPLASTY;  Surgeon: Lamont Morales MD;  Location: [facility] MAIN OR;  Service: Orthopedics   • TOTAL KNEE ARTHROPLASTY Left 5/14/2018    Procedure: LEFT TOTAL KNEE ARTHROPLASTY;  Surgeon: Lamont Morales MD;  Location: [facility] MAIN OR;  Service: Orthopedics       Family History  Family History   Problem Relation Age of Onset   • Malig Hyperthermia Neg Hx        Social History  Social History    Tobacco Use      Smoking status: Never Smoker      Smokeless tobacco: Never Used       Is the Patient a current tobacco user? No    Allergies  Allergies   Allergen Reactions   • Zestril [Lisinopril] Swelling     LIP/ MOUTH SWELLING       Current Medications    Current Outpatient Medications:   •  azelastine (ASTELIN) 0.1 % nasal spray, 1 spray into each nostril Every Evening., Disp: , Rfl:   •  Blood Glucose Monitoring Suppl (ONE TOUCH ULTRA 2) W/DEVICE kit, , Disp: , Rfl:   •  fluticasone (FLONASE) 50 MCG/ACT nasal spray, 2 sprays into each nostril Every Morning., Disp: , Rfl:   •  furosemide (LASIX) 20 MG tablet, Take 20 mg by mouth As Needed (PRN SWELLING)., Disp: , Rfl:   •  glucose blood (ONE TOUCH ULTRA TEST) test strip, , Disp: , Rfl:   •  Januvia 50 MG tablet, TK 1 T PO QAM, Disp: , Rfl:   •  ketotifen (ZADITOR) 0.025 % ophthalmic solution, Administer 1 drop to both eyes 2 (Two) Times a Day., Disp: , Rfl:   •  metFORMIN (GLUCOPHAGE) 500 MG tablet, Take 500 mg by mouth 2 (Two) Times a Day With Meals., Disp: , Rfl:   •  Multiple Vitamins-Minerals (MULTIVITAMIN ADULT PO), Take 1 tablet by mouth Every Morning. To stop before surgery, Disp: , Rfl:   •  NIFEdipine XL (PROCARDIA XL) 60 MG 24 hr tablet, Take 60 mg by mouth 2 (Two) Times a Day., Disp: , Rfl:   •  ONETOUCH DELICA LANCETS 33G misc, , Disp: , Rfl:   •  rosuvastatin (CRESTOR) 10 MG tablet, TK 1 T PO Q 3 DAYS, Disp: , Rfl:   •  spironolactone (ALDACTONE) 25 MG tablet, Take 50 mg by mouth Every Morning., Disp: , Rfl:   •  allopurinol (ZYLOPRIM) 100 MG  tablet, Take 1 tablet by mouth 2 (Two) Times a Day., Disp: 60 tablet, Rfl: 3  •  Breo Ellipta 100-25 MCG/INH inhaler, INL 1 PUFF PO DAILY. RM AFTER U, Disp: , Rfl:   •  SITagliptin (Januvia) 100 MG tablet, Take 1 tablet by mouth Daily for 30 days., Disp: 30 tablet, Rfl: 3     Review of System  Review of Systems   Constitutional: Negative.    Eyes: Negative.    Respiratory: Negative.    Endocrine: Negative.    Genitourinary: Negative.      I have reviewed and confirmed the accuracy of the ROS as documented by the MA/LPN/RN Poncho Lee MD    Vitals:    09/15/20 1105   BP: 124/78   Pulse: 92   Resp: 16   Temp: 97.6 °F (36.4 °C)   SpO2: 98%     Body mass index is 54.87 kg/m².    Objective     Office Visit on 09/15/2020   Component Date Value Ref Range Status   • Glucose 09/15/2020 196* 65 - 99 mg/dL Final   • BUN 09/15/2020 25* 8 - 23 mg/dL Final   • Creatinine 09/15/2020 1.04* 0.57 - 1.00 mg/dL Final   • eGFR Non African Am 09/15/2020 54* >60 mL/min/1.73 Final   • eGFR African Am 09/15/2020 65  >60 mL/min/1.73 Final   • BUN/Creatinine Ratio 09/15/2020 24.0  7.0 - 25.0 Final   • Sodium 09/15/2020 139  136 - 145 mmol/L Final   • Potassium 09/15/2020 4.5  3.5 - 5.2 mmol/L Final   • Chloride 09/15/2020 103  98 - 107 mmol/L Final   • Total CO2 09/15/2020 24.2  22.0 - 29.0 mmol/L Final   • Calcium 09/15/2020 10.1  8.6 - 10.5 mg/dL Final   • Total Protein 09/15/2020 7.2  6.0 - 8.5 g/dL Final   • Albumin 09/15/2020 4.60  3.50 - 5.20 g/dL Final   • Globulin 09/15/2020 2.6  gm/dL Final   • A/G Ratio 09/15/2020 1.8  g/dL Final   • Total Bilirubin 09/15/2020 0.4  0.0 - 1.2 mg/dL Final   • Alkaline Phosphatase 09/15/2020 83  39 - 117 U/L Final   • AST (SGOT) 09/15/2020 19  1 - 32 U/L Final   • ALT (SGPT) 09/15/2020 22  1 - 33 U/L Final   • WBC 09/15/2020 10.85* 3.40 - 10.80 10*3/mm3 Final   • RBC 09/15/2020 5.15  3.77 - 5.28 10*6/mm3 Final   • Hemoglobin 09/15/2020 15.0  12.0 - 15.9 g/dL Final   • Hematocrit 09/15/2020 43.8   34.0 - 46.6 % Final   • MCV 09/15/2020 85.0  79.0 - 97.0 fL Final   • MCH 09/15/2020 29.1  26.6 - 33.0 pg Final   • MCHC 09/15/2020 34.2  31.5 - 35.7 g/dL Final   • RDW 09/15/2020 14.8  12.3 - 15.4 % Final   • Platelets 09/15/2020 326  140 - 450 10*3/mm3 Final   • Neutrophil Rel % 09/15/2020 64.6  42.7 - 76.0 % Final   • Lymphocyte Rel % 09/15/2020 24.1  19.6 - 45.3 % Final   • Monocyte Rel % 09/15/2020 5.6  5.0 - 12.0 % Final   • Eosinophil Rel % 09/15/2020 4.4  0.3 - 6.2 % Final   • Basophil Rel % 09/15/2020 0.6  0.0 - 1.5 % Final   • Neutrophils Absolute 09/15/2020 6.99  1.70 - 7.00 10*3/mm3 Final   • Lymphocytes Absolute 09/15/2020 2.62  0.70 - 3.10 10*3/mm3 Final   • Monocytes Absolute 09/15/2020 0.61  0.10 - 0.90 10*3/mm3 Final   • Eosinophils Absolute 09/15/2020 0.48* 0.00 - 0.40 10*3/mm3 Final   • Basophils Absolute 09/15/2020 0.07  0.00 - 0.20 10*3/mm3 Final   • Immature Granulocyte Rel % 09/15/2020 0.7* 0.0 - 0.5 % Final   • Immature Grans Absolute 09/15/2020 0.08* 0.00 - 0.05 10*3/mm3 Final   • nRBC 09/15/2020 0.0  0.0 - 0.2 /100 WBC Final   • Hemoglobin A1C 09/15/2020 8.1  % Final   • Lot Number 09/15/2020 [id number]   Final   • Expiration Date 09/15/2020 02-   Final       Physical Exam  Physical Exam  Vitals signs and nursing note reviewed.   Constitutional:       Appearance: She is well-developed.   HENT:      Head: Normocephalic and atraumatic.   Eyes:      Conjunctiva/sclera: Conjunctivae normal.   Neck:      Musculoskeletal: Normal range of motion and neck supple.   Cardiovascular:      Rate and Rhythm: Normal rate and regular rhythm.   Neurological:      Mental Status: She is alert.         Assessment/Plan        Patient was initially here for follow-up of hypertension and other medical problems.    Patient relates that she just took her medication about an hour or so before the visit.  She also unfortunately had a increased sodium meal last night which I have discussed with her raises her  blood pressure.  We have had ongoing problems with noncompliance.  She is currently on the nifedipine 60 mg and furosemide 20 mg for hypertension. Her blood pressure with her last visit was elevated at 166/97 and we made a referral in to see Dr. Mejia for his opinion regarding medication advances but the patient become more compliant of her diet now is taking her medication and her blood pressure has improved significantly as we see today. Patient relates that when she takes Lasix more than 20 mg she has severe cramps.    The patient did not bring her glucometer as we stressed several times in the past.  Therefore we're not able to get her one week to week and monthly glucose readings.  Her hemoglobin A1c higher euros is elevated at 8.1.    Patient currently is on metformin 500 mg twice a day and Januvia 50 mg daily.    She is off meloxicam as we felt that this could be contributing to her elevated blood pressure.    The patient relates that she'll be unable to see Dr. Haro until December.    I also feel that the patient's BMI of 54.9 and her obstructive sleep apnea may be contributing to her elevated blood pressure as well  And she is seeking to get a new CPAP machine to aid in this regard.        Katharine was seen today for hypertension and follow-up.    Diagnoses and all orders for this visit:    Essential hypertension: Established, now controlled  -     Comprehensive metabolic panel  -     CBC w AUTO Differential  -     Ambulatory Referral to Nephrology    CAROLE (obstructive sleep apnea): Established further evaluation underway, new CPAP machine pending    Morbid (severe) obesity due to excess calories (CMS/HCC): Established weight unchanged we'll discuss with her next visit further weight loss programs    Type 2 diabetes mellitus with hyperglycemia, without long-term current use of insulin (CMS/HCC): Established, poorly controlled, hyperglycemia evident  -     POCT glycated hemoglobin, total  -     POCT glycated  hemoglobin, total      Plan:  #1.)  Referral still pending with Dr. Mejia regarding recommendations on therapy for hypertension.  #2.)  Follow-up in 3 months with the reevaluation for diabetes.  #4.)  Continue current medications.       Poncho Lee MD  09/15/2020

## 2020-12-14 RX ORDER — FUROSEMIDE 20 MG/1
TABLET ORAL
Qty: 20 TABLET | Refills: 1 | Status: SHIPPED | OUTPATIENT
Start: 2020-12-14 | End: 2021-02-15 | Stop reason: SDUPTHER

## 2020-12-14 NOTE — TELEPHONE ENCOUNTER
Caller: Katharine Mitchell    Relationship: Self    Best call back number: [phone]    Medication needed:   Requested Prescriptions     Pending Prescriptions Disp Refills   • furosemide (LASIX) 20 MG tablet        Sig: Take 1 tablet by mouth As Needed (PRN SWELLING).     What is the patient's preferred pharmacy: [facility] #[id number] [place] [id number] AT [facility] [phone] [facility] [phone]

## 2020-12-28 ENCOUNTER — TELEMEDICINE (OUTPATIENT)
Dept: FAMILY MEDICINE CLINIC | Facility: CLINIC | Age: 60
End: 2020-12-28

## 2020-12-28 DIAGNOSIS — E11.65 TYPE 2 DIABETES MELLITUS WITH HYPERGLYCEMIA, WITHOUT LONG-TERM CURRENT USE OF INSULIN (HCC): Primary | ICD-10-CM

## 2020-12-28 DIAGNOSIS — I10 ESSENTIAL HYPERTENSION: ICD-10-CM

## 2020-12-28 PROCEDURE — 99214 OFFICE O/P EST MOD 30 MIN: CPT | Performed by: INTERNAL MEDICINE

## 2021-01-13 ENCOUNTER — TELEPHONE (OUTPATIENT)
Dept: FAMILY MEDICINE CLINIC | Facility: CLINIC | Age: 61
End: 2021-01-13

## 2021-01-13 RX ORDER — ALLOPURINOL 100 MG/1
TABLET ORAL
Qty: 60 TABLET | Refills: 3 | Status: SHIPPED | OUTPATIENT
Start: 2021-01-13 | End: 2021-05-17 | Stop reason: SDUPTHER

## 2021-01-13 NOTE — TELEPHONE ENCOUNTER
Caller: Katharine Mitchell    Relationship to patient: Self    Best call back number:[phone]    Patient is needing: Patient called in to schedule lab work. Hub attempted to transfer and was unsuccessful. Please call patient and advise

## 2021-01-15 DIAGNOSIS — R73.09 ELEVATED GLUCOSE LEVEL: ICD-10-CM

## 2021-01-15 DIAGNOSIS — I10 ESSENTIAL HYPERTENSION: Primary | ICD-10-CM

## 2021-01-15 DIAGNOSIS — E03.9 HYPOTHYROIDISM, UNSPECIFIED TYPE: ICD-10-CM

## 2021-01-15 DIAGNOSIS — M17.12 OSTEOARTHRITIS OF LEFT KNEE, UNSPECIFIED OSTEOARTHRITIS TYPE: ICD-10-CM

## 2021-01-16 LAB
ALBUMIN SERPL-MCNC: 4.2 G/DL (ref 3.5–5.2)
ALBUMIN/GLOB SERPL: 1.5 G/DL
ALP SERPL-CCNC: 75 U/L (ref 39–117)
ALT SERPL-CCNC: 16 U/L (ref 1–33)
AST SERPL-CCNC: 18 U/L (ref 1–32)
BILIRUB SERPL-MCNC: 0.5 MG/DL (ref 0–1.2)
BUN SERPL-MCNC: 23 MG/DL (ref 8–23)
BUN/CREAT SERPL: 24.5 (ref 7–25)
CALCIUM SERPL-MCNC: 9.6 MG/DL (ref 8.6–10.5)
CHLORIDE SERPL-SCNC: 101 MMOL/L (ref 98–107)
CO2 SERPL-SCNC: 23.8 MMOL/L (ref 22–29)
CREAT SERPL-MCNC: 0.94 MG/DL (ref 0.57–1)
GLOBULIN SER CALC-MCNC: 2.8 GM/DL
GLUCOSE SERPL-MCNC: 188 MG/DL (ref 65–99)
HBA1C MFR BLD: 8 % (ref 4.8–5.6)
Lab: NORMAL
POTASSIUM SERPL-SCNC: 4.5 MMOL/L (ref 3.5–5.2)
PROT SERPL-MCNC: 7 G/DL (ref 6–8.5)
SODIUM SERPL-SCNC: 137 MMOL/L (ref 136–145)
T4 FREE SERPL-MCNC: 1.39 NG/DL (ref 0.93–1.7)
TSH SERPL DL<=0.005 MIU/L-ACNC: 5.39 UIU/ML (ref 0.27–4.2)

## 2021-01-27 ENCOUNTER — TELEPHONE (OUTPATIENT)
Dept: FAMILY MEDICINE CLINIC | Facility: CLINIC | Age: 61
End: 2021-01-27

## 2021-01-28 DIAGNOSIS — E11.65 TYPE 2 DIABETES MELLITUS WITH HYPERGLYCEMIA, UNSPECIFIED WHETHER LONG TERM INSULIN USE (HCC): Primary | ICD-10-CM

## 2021-02-01 DIAGNOSIS — E11.65 TYPE 2 DIABETES MELLITUS WITH HYPERGLYCEMIA, WITHOUT LONG-TERM CURRENT USE OF INSULIN (HCC): Primary | ICD-10-CM

## 2021-02-01 NOTE — TELEPHONE ENCOUNTER
I discussed with the patient better glucose did not improve appreciably with the addition of Januvia.  She is currently taking metformin 500 mg 1 tab by mouth twice a day we'll increase this to 2 twice a day and continue the Januvia.  We'll see her again in follow-up in 3 months.

## 2021-02-15 ENCOUNTER — TELEPHONE (OUTPATIENT)
Dept: FAMILY MEDICINE CLINIC | Facility: CLINIC | Age: 61
End: 2021-02-15

## 2021-02-15 DIAGNOSIS — I10 ESSENTIAL HYPERTENSION: Primary | ICD-10-CM

## 2021-02-15 RX ORDER — FUROSEMIDE 20 MG/1
TABLET ORAL
Qty: 20 TABLET | Refills: 3 | Status: SHIPPED | OUTPATIENT
Start: 2021-02-15 | End: 2021-12-14

## 2021-02-15 RX ORDER — NIFEDIPINE 60 MG/1
60 TABLET, EXTENDED RELEASE ORAL 2 TIMES DAILY
Qty: 60 TABLET | Refills: 3 | Status: CANCELLED | OUTPATIENT
Start: 2021-02-15

## 2021-02-15 RX ORDER — NIFEDIPINE 60 MG/1
60 TABLET, EXTENDED RELEASE ORAL 2 TIMES DAILY
Qty: 180 TABLET | Refills: 2 | Status: SHIPPED | OUTPATIENT
Start: 2021-02-15 | End: 2022-03-24

## 2021-02-15 RX ORDER — NIFEDIPINE 60 MG/1
60 TABLET, EXTENDED RELEASE ORAL 2 TIMES DAILY
Qty: 60 TABLET | Refills: 3 | Status: SHIPPED | OUTPATIENT
Start: 2021-02-15 | End: 2021-02-15 | Stop reason: SDUPTHER

## 2021-02-15 NOTE — TELEPHONE ENCOUNTER
Caller: Katharine Mitchell    Relationship: Self    Best call back number: [phone]    Medication needed:   Requested Prescriptions     Pending Prescriptions Disp Refills   • NIFEdipine XL (PROCARDIA XL) 60 MG 24 hr tablet        Sig: Take 1 tablet by mouth 2 (Two) Times a Day.       When do you need the refill by: ASAP    What details did the patient provide when requesting the medication: PATIENT STATED THAT SHE IS CURRENTLY COMPLETELY OUT OF THIS MEDICATION.     Does the patient have less than a 3 day supply:  [x] Yes  [] No    What is the patient's preferred pharmacy: [facility] #[id number] [place] [id number] AT [facility] [phone] [facility] [phone]

## 2021-03-30 DIAGNOSIS — E11.65 TYPE 2 DIABETES MELLITUS WITH HYPERGLYCEMIA, UNSPECIFIED WHETHER LONG TERM INSULIN USE (HCC): Primary | ICD-10-CM

## 2021-03-30 RX ORDER — ROSUVASTATIN CALCIUM 10 MG/1
TABLET, COATED ORAL
Qty: 30 TABLET | Refills: 0 | Status: SHIPPED | OUTPATIENT
Start: 2021-03-30 | End: 2021-07-14 | Stop reason: SDUPTHER

## 2021-05-17 RX ORDER — ALLOPURINOL 100 MG/1
100 TABLET ORAL 2 TIMES DAILY
Qty: 60 TABLET | Refills: 3 | Status: SHIPPED | OUTPATIENT
Start: 2021-05-17 | End: 2021-09-13

## 2021-05-18 ENCOUNTER — OFFICE VISIT (OUTPATIENT)
Dept: FAMILY MEDICINE CLINIC | Facility: CLINIC | Age: 61
End: 2021-05-18

## 2021-05-18 VITALS
BODY MASS INDEX: 53.92 KG/M2 | SYSTOLIC BLOOD PRESSURE: 122 MMHG | HEART RATE: 97 BPM | RESPIRATION RATE: 16 BRPM | DIASTOLIC BLOOD PRESSURE: 80 MMHG | WEIGHT: 293 LBS | OXYGEN SATURATION: 99 % | HEIGHT: 62 IN

## 2021-05-18 DIAGNOSIS — I10 ESSENTIAL HYPERTENSION: ICD-10-CM

## 2021-05-18 DIAGNOSIS — E03.9 ACQUIRED HYPOTHYROIDISM: ICD-10-CM

## 2021-05-18 DIAGNOSIS — E11.65 TYPE 2 DIABETES MELLITUS WITH HYPERGLYCEMIA, WITHOUT LONG-TERM CURRENT USE OF INSULIN (HCC): Primary | ICD-10-CM

## 2021-05-18 LAB
EXPIRATION DATE: ABNORMAL
HBA1C MFR BLD: 7.4 %
Lab: ABNORMAL

## 2021-05-18 PROCEDURE — 36416 COLLJ CAPILLARY BLOOD SPEC: CPT | Performed by: INTERNAL MEDICINE

## 2021-05-18 PROCEDURE — 99214 OFFICE O/P EST MOD 30 MIN: CPT | Performed by: INTERNAL MEDICINE

## 2021-05-18 PROCEDURE — 83036 HEMOGLOBIN GLYCOSYLATED A1C: CPT | Performed by: INTERNAL MEDICINE

## 2021-05-18 NOTE — PROGRESS NOTES
05/18/2021    CC: Diabetes (follow up) and Hypertension (follow up...no other issues (bp hard to hear))  .        HPI  Diabetes  She has type 2 diabetes mellitus. No MedicAlert identification noted. Her disease course has been stable. Pertinent negatives for hypoglycemia include no confusion, dizziness, headaches, hunger, mood changes, nervousness/anxiousness, pallor, seizures, sleepiness, speech difficulty, sweats or tremors. There are no diabetic associated symptoms. Pertinent negatives for diabetes include no blurred vision, no chest pain, no fatigue, no foot paresthesias, no foot ulcerations, no polydipsia, no polyphagia, no polyuria, no visual change, no weakness and no weight loss. Pertinent negatives for hypoglycemia complications include no blackouts, no hospitalization, no nocturnal hypoglycemia, no required assistance and no required glucagon injection. Symptoms are improving. Pertinent negatives for diabetic complications include no CVA, heart disease, impotence, nephropathy, peripheral neuropathy, PVD or retinopathy. There are no known risk factors for coronary artery disease. Current diabetic treatment includes oral agent (dual therapy). She is compliant with treatment all of the time. She monitors blood glucose at home 1-2 x per day. Blood glucose monitoring compliance is good. Her home blood glucose trend is decreasing steadily. Her breakfast blood glucose is taken after 10 am. Her breakfast blood glucose range is generally 130-140 mg/dl. Her dinner blood glucose is taken between 6-7 pm. Her dinner blood glucose range is generally 110-130 mg/dl. Her highest blood glucose is 130-140 mg/dl. Her overall blood glucose range is 130-140 mg/dl.   Hypertension  Pertinent negatives include no blurred vision, chest pain, headaches or sweats. There is no history of CVA, PVD or retinopathy.        Ilene Mitchell is a 60 y.o. female.      The following portions of the patient's history were  reviewed and updated as appropriate: allergies, current medications, past family history, past medical history, past social history, past surgical history and problem list.    Problem List  Patient Active Problem List   Diagnosis   • Abnormal EKG   • Essential hypertension   • Preop cardiovascular exam   • Osteoarthritis of right knee   • Osteoarthritis of left knee   • Type 2 diabetes mellitus (CMS/HCC)   • Class 3 obesity due to excess calories in adult   • CAROLE (obstructive sleep apnea)   • Screening for colon cancer   • Polyp of transverse colon       Past Medical History  Past Medical History:   Diagnosis Date   • Arthritis    • Diabetes mellitus (CMS/HCC)     TYPE 2   • Hypertension    • Knee pain    • Sleep apnea     DOES NOT WEAR CPAP   • UTI (urinary tract infection)     TREATED FOR UTI JAN 2017       Surgical History  Past Surgical History:   Procedure Laterality Date   • COLONOSCOPY     • COLONOSCOPY N/A 1/6/2020    Procedure: COLONOSCOPY TO CECUM WITH COLD BIOPSY POLYPECTOMY AND 2 ML SALINE LIFT INJECTION;  Surgeon: Eva Rizvi MD;  Location: [facility] ENDOSCOPY;  Service: General   • GALLBLADDER SURGERY     • JOINT REPLACEMENT     • KNEE ARTHROSCOPY Left    • PILONIDAL CYSTECTOMY     • AR TOTAL KNEE ARTHROPLASTY Right 2/20/2017    Procedure: RT TOTAL KNEE ARTHROPLASTY;  Surgeon: Lamont Morales MD;  Location: [facility] OR;  Service: Orthopedics   • TOTAL KNEE ARTHROPLASTY Left 5/14/2018    Procedure: LEFT TOTAL KNEE ARTHROPLASTY;  Surgeon: Lamont Morales MD;  Location: [facility] OR;  Service: Orthopedics       Family History  Family History   Problem Relation Age of Onset   • Malig Hyperthermia Neg Hx        Social History  Social History    Tobacco Use      Smoking status: Never Smoker      Smokeless tobacco: Never Used       Is the Patient a current tobacco user? No    Allergies  Allergies   Allergen Reactions   • Zestril [Lisinopril] Swelling     LIP/ MOUTH SWELLING       Current  Medications    Current Outpatient Medications:   •  allopurinol (ZYLOPRIM) 100 MG tablet, Take 1 tablet by mouth 2 (Two) Times a Day., Disp: 60 tablet, Rfl: 3  •  azelastine (ASTELIN) 0.1 % nasal spray, 1 spray into each nostril Every Evening., Disp: , Rfl:   •  Blood Glucose Monitoring Suppl (ONE TOUCH ULTRA 2) W/DEVICE kit, , Disp: , Rfl:   •  Breo Ellipta 100-25 MCG/INH inhaler, INL 1 PUFF PO DAILY. RM AFTER U, Disp: , Rfl:   •  fluticasone (FLONASE) 50 MCG/ACT nasal spray, 2 sprays into each nostril Every Morning., Disp: , Rfl:   •  furosemide (LASIX) 20 MG tablet, 1 tablet daily as needed, Disp: 20 tablet, Rfl: 3  •  glucose blood (ONE TOUCH ULTRA TEST) test strip, , Disp: , Rfl:   •  ketotifen (ZADITOR) 0.025 % ophthalmic solution, Administer 1 drop to both eyes 2 (Two) Times a Day., Disp: , Rfl:   •  metFORMIN (GLUCOPHAGE) 500 MG tablet, 2 bid with food, Disp: 360 tablet, Rfl: 2  •  Multiple Vitamins-Minerals (MULTIVITAMIN ADULT PO), Take 1 tablet by mouth Every Morning. To stop before surgery, Disp: , Rfl:   •  NIFEdipine XL (PROCARDIA XL) 60 MG 24 hr tablet, Take 1 tablet by mouth 2 (Two) Times a Day., Disp: 180 tablet, Rfl: 2  •  ONETOUCH DELICA LANCETS 33G misc, , Disp: , Rfl:   •  rosuvastatin (CRESTOR) 10 MG tablet, Take 1 tablet by mouth every 3 days, Disp: 30 tablet, Rfl: 0  •  SITagliptin (Januvia) 100 MG tablet, Take 1 tablet by mouth Daily for 30 days., Disp: 30 tablet, Rfl: 3  •  spironolactone (ALDACTONE) 25 MG tablet, Take  by mouth Every Morning. 2po qam 1po qpm, Disp: , Rfl:      Review of System  Review of Systems   Constitutional: Negative for fatigue and unexpected weight loss.   Eyes: Negative for blurred vision.   Respiratory: Negative.    Cardiovascular: Negative.  Negative for chest pain.   Endocrine: Negative for polydipsia, polyphagia and polyuria.   Genitourinary: Negative for impotence.   Skin: Negative for pallor.   Neurological: Negative for dizziness, tremors, seizures, speech  difficulty, weakness and confusion.   Psychiatric/Behavioral: The patient is not nervous/anxious.      I have reviewed and confirmed the accuracy of the ROS as documented by the MA/LPN/RN Poncho Lee MD    Vitals:    05/18/21 1328   BP: 122/80   Pulse: 97   Resp: 16   SpO2: 99%     Body mass index is 54.36 kg/m².    Objective     Physical Exam  Physical Exam  Constitutional:       Appearance: She is obese.   HENT:      Head: Normocephalic and atraumatic.   Cardiovascular:      Pulses: Normal pulses.      Heart sounds: Normal heart sounds.   Pulmonary:      Effort: Pulmonary effort is normal.      Breath sounds: Normal breath sounds.   Neurological:      Mental Status: She is alert.         Assessment/Plan      This 60-year-old presents at this time for follow-up of diabetes mellitus type 2 and hypertension.  She relates she is taking her medication as prescribed and has had no hypoglycemic episodes in the interim of visits.  Last office visit was 9/29.    She is lost 3 pounds from 929.    Her 7-day glucose average is 168, 14-day average of 146 and 30-day average 154.    Recall the previous A1c was 8.0.  A1c today is 7.4.    She relates that she is exercising more by swimming and using the pool.    She has had no shortness of breath or extreme coughing episodes.  Note is made that she was started on Januvia 100 mg with her visit in October.  Note is made that her Last PSA was slightly elevated at 5.39 on 1/15/2021.        Diagnoses and all orders for this visit:    1. Type 2 diabetes mellitus with hyperglycemia, without long-term current use of insulin (CMS/[facility]) (Primary)  -     POCT glycated hemoglobin, total; Standing  -     POCT glycated hemoglobin, total    2. Acquired hypothyroidism  -     T4, free  -     TSH    3. Essential hypertension      Plan:  1.)  Continue current Janumet 100 mg p.o. daily and Metformin 500 mg 1 in the morning 1 in the afternoon.  2.)  Follow-up for physical examination in the next  3 weeks  3.)  Follow-up diabetes mellitus type 2 in 3 months.       Poncho Lee MD  05/18/2021

## 2021-05-19 LAB
T4 FREE SERPL-MCNC: 1.17 NG/DL (ref 0.82–1.77)
TSH SERPL DL<=0.005 MIU/L-ACNC: 4.04 UIU/ML (ref 0.45–4.5)

## 2021-05-25 RX ORDER — SPIRONOLACTONE 25 MG/1
TABLET ORAL
Qty: 90 TABLET | Refills: 5 | Status: SHIPPED | OUTPATIENT
Start: 2021-05-25 | End: 2021-11-22

## 2021-06-02 DIAGNOSIS — E11.65 TYPE 2 DIABETES MELLITUS WITH HYPERGLYCEMIA, UNSPECIFIED WHETHER LONG TERM INSULIN USE (HCC): ICD-10-CM

## 2021-06-02 RX ORDER — SITAGLIPTIN 100 MG/1
TABLET, FILM COATED ORAL
Qty: 30 TABLET | Refills: 3 | Status: SHIPPED | OUTPATIENT
Start: 2021-06-02 | End: 2021-07-28

## 2021-07-14 ENCOUNTER — OFFICE VISIT (OUTPATIENT)
Dept: FAMILY MEDICINE CLINIC | Facility: CLINIC | Age: 61
End: 2021-07-14

## 2021-07-14 VITALS
SYSTOLIC BLOOD PRESSURE: 126 MMHG | DIASTOLIC BLOOD PRESSURE: 90 MMHG | RESPIRATION RATE: 16 BRPM | WEIGHT: 289.4 LBS | HEIGHT: 62 IN | BODY MASS INDEX: 53.26 KG/M2

## 2021-07-14 DIAGNOSIS — E78.00 PURE HYPERCHOLESTEROLEMIA: ICD-10-CM

## 2021-07-14 DIAGNOSIS — Z00.00 ENCOUNTER FOR ANNUAL HEALTH EXAMINATION: Primary | ICD-10-CM

## 2021-07-14 DIAGNOSIS — Z23 NEED FOR TDAP VACCINATION: ICD-10-CM

## 2021-07-14 DIAGNOSIS — M17.11 PRIMARY OSTEOARTHRITIS OF RIGHT KNEE: ICD-10-CM

## 2021-07-14 DIAGNOSIS — E11.65 TYPE 2 DIABETES MELLITUS WITH HYPERGLYCEMIA, UNSPECIFIED WHETHER LONG TERM INSULIN USE (HCC): ICD-10-CM

## 2021-07-14 PROBLEM — J45.909 ASTHMA: Status: ACTIVE | Noted: 2021-03-02

## 2021-07-14 PROCEDURE — 90471 IMMUNIZATION ADMIN: CPT | Performed by: INTERNAL MEDICINE

## 2021-07-14 PROCEDURE — 90715 TDAP VACCINE 7 YRS/> IM: CPT | Performed by: INTERNAL MEDICINE

## 2021-07-14 PROCEDURE — 99396 PREV VISIT EST AGE 40-64: CPT | Performed by: INTERNAL MEDICINE

## 2021-07-14 RX ORDER — METHOCARBAMOL 500 MG/1
TABLET, FILM COATED ORAL
COMMUNITY
Start: 2021-06-25 | End: 2021-12-02

## 2021-07-14 RX ORDER — DIAZEPAM 5 MG/1
TABLET ORAL
COMMUNITY
Start: 2021-06-25 | End: 2021-12-02

## 2021-07-14 RX ORDER — ROSUVASTATIN CALCIUM 10 MG/1
TABLET, COATED ORAL
Qty: 30 TABLET | Refills: 0 | Status: SHIPPED | OUTPATIENT
Start: 2021-07-14 | End: 2021-10-25

## 2021-07-15 LAB — HCV AB S/CO SERPL IA: 0.2 S/CO RATIO (ref 0–0.9)

## 2021-07-18 NOTE — PROGRESS NOTES
07/14/2021    CC: Annual Exam (...no other issues)  .        HPI  This pleasant 60-year-old patient presents today for physical examination.  She also has history of well-controlled hypertension and degenerative joint disease.  She's been followed by Dr. Daniels, orthopedic surgeon for her degenerative joint disease.       Subjective   Katharine Mitchell is a 60 y.o. female.      The following portions of the patient's history were reviewed and updated as appropriate: allergies, current medications, past family history, past medical history, past social history, past surgical history and problem list.    Problem List  Patient Active Problem List   Diagnosis   • Abnormal EKG   • Essential hypertension   • Preop cardiovascular exam   • Osteoarthritis of right knee   • Osteoarthritis of left knee   • Type 2 diabetes mellitus (CMS/HCC)   • Class 3 obesity due to excess calories in adult   • CAROLE (obstructive sleep apnea)   • Screening for colon cancer   • Polyp of transverse colon   • Asthma       Past Medical History  Past Medical History:   Diagnosis Date   • Arthritis    • Diabetes mellitus (CMS/HCC)     TYPE 2   • Hypertension    • Knee pain    • Sleep apnea     DOES NOT WEAR CPAP   • UTI (urinary tract infection)     TREATED FOR UTI JAN 2017       Surgical History  Past Surgical History:   Procedure Laterality Date   • COLONOSCOPY     • COLONOSCOPY N/A 1/6/2020    Procedure: COLONOSCOPY TO CECUM WITH COLD BIOPSY POLYPECTOMY AND 2 ML SALINE LIFT INJECTION;  Surgeon: Eva Rizvi MD;  Location: [facility] ENDOSCOPY;  Service: General   • GALLBLADDER SURGERY     • JOINT REPLACEMENT     • KNEE ARTHROSCOPY Left    • PILONIDAL CYSTECTOMY     • MS TOTAL KNEE ARTHROPLASTY Right 2/20/2017    Procedure: RT TOTAL KNEE ARTHROPLASTY;  Surgeon: Lamont Morales MD;  Location: [facility] MAIN OR;  Service: Orthopedics   • TOTAL KNEE ARTHROPLASTY Left 5/14/2018    Procedure: LEFT TOTAL KNEE ARTHROPLASTY;  Surgeon: Lamont Morales MD;   Location: [facility] MAIN OR;  Service: Orthopedics       Family History  Family History   Problem Relation Age of Onset   • Malig Hyperthermia Neg Hx        Social History  Social History    Tobacco Use      Smoking status: Never Smoker      Smokeless tobacco: Never Used       Is the Patient a current tobacco user? No    Allergies  Allergies   Allergen Reactions   • Zestril [Lisinopril] Swelling     LIP/ MOUTH SWELLING       Current Medications    Current Outpatient Medications:   •  allopurinol (ZYLOPRIM) 100 MG tablet, Take 1 tablet by mouth 2 (Two) Times a Day., Disp: 60 tablet, Rfl: 3  •  azelastine (ASTELIN) 0.1 % nasal spray, 1 spray into each nostril Every Evening., Disp: , Rfl:   •  Blood Glucose Monitoring Suppl (ONE TOUCH ULTRA 2) W/DEVICE kit, , Disp: , Rfl:   •  Breo Ellipta 100-25 MCG/INH inhaler, INL 1 PUFF PO DAILY. RM AFTER U, Disp: , Rfl:   •  diazePAM (VALIUM) 5 MG tablet, TAKE 1 TO 2 TABLETS BY MOUTH 30 MINUTES BEFORE MRI, Disp: , Rfl:   •  fluticasone (FLONASE) 50 MCG/ACT nasal spray, 2 sprays into each nostril Every Morning., Disp: , Rfl:   •  furosemide (LASIX) 20 MG tablet, 1 tablet daily as needed, Disp: 20 tablet, Rfl: 3  •  glucose blood (ONE TOUCH ULTRA TEST) test strip, , Disp: , Rfl:   •  Januvia 100 MG tablet, TAKE 1 TABLET BY MOUTH DAILY, Disp: 30 tablet, Rfl: 3  •  ketotifen (ZADITOR) 0.025 % ophthalmic solution, Administer 1 drop to both eyes 2 (Two) Times a Day., Disp: , Rfl:   •  metFORMIN (GLUCOPHAGE) 500 MG tablet, 2 bid with food, Disp: 360 tablet, Rfl: 2  •  methocarbamol (ROBAXIN) 500 MG tablet, TAKE 1 TABLET BY MOUTH THREE TIMES DAILY, Disp: , Rfl:   •  Multiple Vitamins-Minerals (MULTIVITAMIN ADULT PO), Take 1 tablet by mouth Every Morning. To stop before surgery, Disp: , Rfl:   •  NIFEdipine XL (PROCARDIA XL) 60 MG 24 hr tablet, Take 1 tablet by mouth 2 (Two) Times a Day., Disp: 180 tablet, Rfl: 2  •  ONETOUCH DELICA LANCETS 33G misc, , Disp: , Rfl:   •  rosuvastatin (CRESTOR)  10 MG tablet, Take 1 tablet by mouth every 3 days, Disp: 30 tablet, Rfl: 0  •  spironolactone (ALDACTONE) 25 MG tablet, 2po qam 1po qpm, Disp: 90 tablet, Rfl: 5     Review of System  Review of Systems   Constitutional: Negative.    HENT: Negative.    Eyes: Negative.    Respiratory: Negative.    Cardiovascular: Negative.    Gastrointestinal: Negative.    Endocrine: Negative.    Genitourinary: Negative.    Musculoskeletal: Negative.    Skin: Negative.    Allergic/Immunologic: Negative.    Neurological: Negative.    Hematological: Negative.    Psychiatric/Behavioral: Negative.      I have reviewed and confirmed the accuracy of the ROS as documented by the MA/LPN/RN Poncho Lee MD    Vitals:    07/14/21 1306   BP: 126/90   Resp: 16     Body mass index is 52.93 kg/m².    Objective     Physical Exam  Physical Exam  Vitals and nursing note reviewed.   Constitutional:       Appearance: She is well-developed.   HENT:      Head: Normocephalic and atraumatic.   Eyes:      Conjunctiva/sclera: Conjunctivae normal.   Cardiovascular:      Rate and Rhythm: Normal rate and regular rhythm.      Heart sounds: Normal heart sounds.   Pulmonary:      Effort: Pulmonary effort is normal.      Breath sounds: Normal breath sounds.   Abdominal:      General: Bowel sounds are normal.      Palpations: Abdomen is soft.   Musculoskeletal:         General: Normal range of motion.      Cervical back: Normal range of motion and neck supple.   Skin:     General: Skin is warm and dry.   Neurological:      Mental Status: She is alert and oriented to person, place, and time.   Psychiatric:         Behavior: Behavior normal.         Assessment/Plan      Regarding anticipatory guidance.    We discussed the importance of keeping her cholesterol less than 100 from the LDL perspective.  We discussed low-cholesterol diets. Her last lipid profile was greater than 1 year ago.          Diagnoses and all orders for this visit:    1. Encounter for annual  health examination (Primary)  -     Hepatitis C Antibody  -     Cologuard - Stool, Per Rectum; Future    2. Type 2 diabetes mellitus with hyperglycemia, unspecified whether long term insulin use (CMS/HCC)  -     MicroAlbumin, Urine, Random - Urine, Clean Catch; Future    3. Primary osteoarthritis of right knee    4. Pure hypercholesterolemia  -     rosuvastatin (CRESTOR) 10 MG tablet; Take 1 tablet by mouth every 3 days  Dispense: 30 tablet; Refill: 0    5. Need for Tdap vaccination  -     Tdap Vaccine Greater Than or Equal To 8yo IM      Plan:  1.)  Continue Crestor 10 mg by mouth daily  #2.)  Lipid profile and Cologuard.       Poncho Lee MD  07/14/2021

## 2021-07-19 DIAGNOSIS — E11.65 TYPE 2 DIABETES MELLITUS WITH HYPERGLYCEMIA, WITHOUT LONG-TERM CURRENT USE OF INSULIN (HCC): Primary | ICD-10-CM

## 2021-07-19 RX ORDER — LANCETS 33 GAUGE
EACH MISCELLANEOUS
Qty: 100 EACH | Refills: 3 | Status: SHIPPED | OUTPATIENT
Start: 2021-07-19 | End: 2023-03-30 | Stop reason: SDUPTHER

## 2021-07-28 DIAGNOSIS — E11.65 TYPE 2 DIABETES MELLITUS WITH HYPERGLYCEMIA, UNSPECIFIED WHETHER LONG TERM INSULIN USE (HCC): ICD-10-CM

## 2021-07-28 RX ORDER — SITAGLIPTIN 100 MG/1
TABLET, FILM COATED ORAL
Qty: 90 TABLET | Refills: 1 | Status: SHIPPED | OUTPATIENT
Start: 2021-07-28 | End: 2022-01-24

## 2021-09-02 ENCOUNTER — OFFICE VISIT (OUTPATIENT)
Dept: FAMILY MEDICINE CLINIC | Facility: CLINIC | Age: 61
End: 2021-09-02

## 2021-09-02 VITALS
RESPIRATION RATE: 16 BRPM | DIASTOLIC BLOOD PRESSURE: 82 MMHG | SYSTOLIC BLOOD PRESSURE: 126 MMHG | WEIGHT: 293 LBS | HEIGHT: 62 IN | BODY MASS INDEX: 53.92 KG/M2

## 2021-09-02 DIAGNOSIS — R63.5 WEIGHT GAIN: ICD-10-CM

## 2021-09-02 DIAGNOSIS — E11.65 TYPE 2 DIABETES MELLITUS WITH HYPERGLYCEMIA, WITHOUT LONG-TERM CURRENT USE OF INSULIN (HCC): Chronic | ICD-10-CM

## 2021-09-02 DIAGNOSIS — I10 ESSENTIAL HYPERTENSION: Primary | Chronic | ICD-10-CM

## 2021-09-02 LAB
EXPIRATION DATE: ABNORMAL
HBA1C MFR BLD: 7.1 %
Lab: ABNORMAL

## 2021-09-02 PROCEDURE — 83036 HEMOGLOBIN GLYCOSYLATED A1C: CPT | Performed by: INTERNAL MEDICINE

## 2021-09-02 PROCEDURE — 99214 OFFICE O/P EST MOD 30 MIN: CPT | Performed by: INTERNAL MEDICINE

## 2021-09-02 PROCEDURE — 3051F HG A1C>EQUAL 7.0%<8.0%: CPT | Performed by: INTERNAL MEDICINE

## 2021-09-02 PROCEDURE — 36416 COLLJ CAPILLARY BLOOD SPEC: CPT | Performed by: INTERNAL MEDICINE

## 2021-09-02 NOTE — PROGRESS NOTES
Answers for HPI/ROS submitted by the patient on 8/26/2021  What is the primary reason for your visit?: Diabetes  Diabetes type: type 2  MedicAlert ID: No  blurred vision: No  chest pain: No  fatigue: No  foot paresthesias: No  foot ulcerations: No  polydipsia: No  polyphagia: No  polyuria: No  visual change: No  weakness: No  weight loss: No  Symptom course: improving  confusion: No  dizziness: No  headaches: No  hunger: No  mood changes: No  nervous/anxious: No  pallor: No  seizures: No  sleepiness: No  speech difficulty: No  sweats: No  tremors: No  blackouts: No  hospitalization: No  nocturnal hypoglycemia: No  required assistance: No  required glucagon: No  CVA: No  heart disease: No  impotence: No  nephropathy: No  peripheral neuropathy: No  PVD: No  retinopathy: No  CAD risks: no known risk factors  Current treatments: oral agent (dual therapy)  Treatment compliance: all of the time  Home blood tests: 1-2 x per day  Monitoring compliance: good  Blood glucose trend: decreasing steadily  breakfast time: after 10 am  breakfast glucose level: 130-140  lunch time: 1-2 pm  lunch glucose level: 110-130  dinner time: 6-7 pm  dinner glucose level: 110-130  High score: 140-180  Overall: 140-180  Weight trend: decreasing steadily  Current diet: generally healthy  Meal planning: carbohydrate counting  Exercise: three times a week  Dietitian visit: No  Eye exam current: Yes  Sees podiatrist: No    09/02/2021    CC: Diabetes (f/u) and Hypertension (f/u...no other issues)  .        HPI  Diabetes  She has type 2 diabetes mellitus. No MedicAlert identification noted. Pertinent negatives for hypoglycemia include no confusion, dizziness, headaches, hunger, mood changes, nervousness/anxiousness, pallor, seizures, sleepiness, speech difficulty, sweats or tremors. Pertinent negatives for diabetes include no blurred vision, no chest pain, no fatigue, no foot paresthesias, no foot ulcerations, no polydipsia, no polyphagia, no  polyuria, no visual change, no weakness and no weight loss. Pertinent negatives for hypoglycemia complications include no blackouts, no hospitalization, no nocturnal hypoglycemia, no required assistance and no required glucagon injection. Symptoms are improving. Pertinent negatives for diabetic complications include no CVA, heart disease, impotence, nephropathy, peripheral neuropathy, PVD or retinopathy. There are no known risk factors for coronary artery disease. Current diabetic treatment includes oral agent (dual therapy). She is compliant with treatment all of the time. Her weight is decreasing steadily. She is following a generally healthy diet. Meal planning includes carbohydrate counting. She has not had a previous visit with a dietitian. She participates in exercise three times a week. She monitors blood glucose at home 1-2 x per day. Blood glucose monitoring compliance is good. Her home blood glucose trend is decreasing steadily. Her breakfast blood glucose is taken after 10 am. Her breakfast blood glucose range is generally 130-140 mg/dl. Her lunch blood glucose is taken between 1-2 pm. Her lunch blood glucose range is generally 110-130 mg/dl. Her dinner blood glucose is taken between 6-7 pm. Her dinner blood glucose range is generally 110-130 mg/dl. Her highest blood glucose is 140-180 mg/dl. Her overall blood glucose range is 140-180 mg/dl. She does not see a podiatrist.Eye exam is current.   Hypertension  Pertinent negatives include no blurred vision, chest pain, headaches or sweats. There is no history of CVA, PVD or retinopathy.        Ilene Mitchell is a 60 y.o. female.      The following portions of the patient's history were reviewed and updated as appropriate: allergies, current medications, past family history, past medical history, past social history, past surgical history and problem list.    Problem List  Patient Active Problem List   Diagnosis   • Abnormal EKG   • Essential  hypertension   • Preop cardiovascular exam   • Osteoarthritis of right knee   • Osteoarthritis of left knee   • Type 2 diabetes mellitus (CMS/HCC)   • Class 3 obesity due to excess calories in adult   • CAROLE (obstructive sleep apnea)   • Screening for colon cancer   • Polyp of transverse colon   • Asthma       Past Medical History  Past Medical History:   Diagnosis Date   • Arthritis    • Diabetes mellitus (CMS/HCC)     TYPE 2   • Hypertension    • Knee pain    • Sleep apnea     DOES NOT WEAR CPAP   • UTI (urinary tract infection)     TREATED FOR UTI JAN 2017       Surgical History  Past Surgical History:   Procedure Laterality Date   • COLONOSCOPY     • COLONOSCOPY N/A 1/6/2020    Procedure: COLONOSCOPY TO CECUM WITH COLD BIOPSY POLYPECTOMY AND 2 ML SALINE LIFT INJECTION;  Surgeon: Eva Rizvi MD;  Location: [facility] ENDOSCOPY;  Service: General   • GALLBLADDER SURGERY     • JOINT REPLACEMENT     • KNEE ARTHROSCOPY Left    • PILONIDAL CYSTECTOMY     • LA TOTAL KNEE ARTHROPLASTY Right 2/20/2017    Procedure: RT TOTAL KNEE ARTHROPLASTY;  Surgeon: Lamont Morales MD;  Location: [facility] MAIN OR;  Service: Orthopedics   • TOTAL KNEE ARTHROPLASTY Left 5/14/2018    Procedure: LEFT TOTAL KNEE ARTHROPLASTY;  Surgeon: Lamont Morales MD;  Location: [facility] MAIN OR;  Service: Orthopedics       Family History  Family History   Problem Relation Age of Onset   • Malig Hyperthermia Neg Hx        Social History  Social History    Tobacco Use      Smoking status: Never Smoker      Smokeless tobacco: Never Used       Is the Patient a current tobacco user? No    Allergies  Allergies   Allergen Reactions   • Zestril [Lisinopril] Swelling     LIP/ MOUTH SWELLING       Current Medications    Current Outpatient Medications:   •  allopurinol (ZYLOPRIM) 100 MG tablet, Take 1 tablet by mouth 2 (Two) Times a Day., Disp: 60 tablet, Rfl: 3  •  azelastine (ASTELIN) 0.1 % nasal spray, 1 spray into each nostril Every Evening., Disp: , Rfl:   •   Blood Glucose Monitoring Suppl (ONE TOUCH ULTRA 2) W/DEVICE kit, , Disp: , Rfl:   •  Breo Ellipta 100-25 MCG/INH inhaler, INL 1 PUFF PO DAILY. RM AFTER U, Disp: , Rfl:   •  diazePAM (VALIUM) 5 MG tablet, TAKE 1 TO 2 TABLETS BY MOUTH 30 MINUTES BEFORE MRI, Disp: , Rfl:   •  fluticasone (FLONASE) 50 MCG/ACT nasal spray, 2 sprays into each nostril Every Morning., Disp: , Rfl:   •  furosemide (LASIX) 20 MG tablet, 1 tablet daily as needed, Disp: 20 tablet, Rfl: 3  •  glucose blood (ONE TOUCH ULTRA TEST) test strip, , Disp: , Rfl:   •  Januvia 100 MG tablet, TAKE 1 TABLET BY MOUTH DAILY, Disp: 90 tablet, Rfl: 1  •  ketotifen (ZADITOR) 0.025 % ophthalmic solution, Administer 1 drop to both eyes 2 (Two) Times a Day., Disp: , Rfl:   •  metFORMIN (GLUCOPHAGE) 500 MG tablet, 2 bid with food, Disp: 360 tablet, Rfl: 2  •  methocarbamol (ROBAXIN) 500 MG tablet, TAKE 1 TABLET BY MOUTH THREE TIMES DAILY, Disp: , Rfl:   •  Multiple Vitamins-Minerals (MULTIVITAMIN ADULT PO), Take 1 tablet by mouth Every Morning. To stop before surgery, Disp: , Rfl:   •  NIFEdipine XL (PROCARDIA XL) 60 MG 24 hr tablet, Take 1 tablet by mouth 2 (Two) Times a Day., Disp: 180 tablet, Rfl: 2  •  OneTouch Delica Lancets 33G misc, Test glucose daily, Disp: 100 each, Rfl: 3  •  rosuvastatin (CRESTOR) 10 MG tablet, Take 1 tablet by mouth every 3 days, Disp: 30 tablet, Rfl: 0  •  spironolactone (ALDACTONE) 25 MG tablet, 2po qam 1po qpm, Disp: 90 tablet, Rfl: 5     Review of System  Review of Systems   Constitutional: Negative for fatigue and unexpected weight loss.   Eyes: Negative for blurred vision.   Cardiovascular: Negative for chest pain.   Endocrine: Negative for polydipsia, polyphagia and polyuria.   Genitourinary: Negative for impotence.   Skin: Negative for pallor.   Neurological: Negative for dizziness, tremors, seizures, speech difficulty, weakness and confusion.   Psychiatric/Behavioral: The patient is not nervous/anxious.      I have reviewed and  confirmed the accuracy of the ROS as documented by the MA/LPN/RN Poncho Lee MD    Vitals:    09/02/21 1011   BP: 126/82   Resp: 16     Body mass index is 53.85 kg/m².    Objective     Physical Exam  Physical Exam  Constitutional:       Appearance: Normal appearance.   Cardiovascular:      Rate and Rhythm: Regular rhythm.      Pulses: Normal pulses.      Heart sounds: Normal heart sounds.   Pulmonary:      Breath sounds: Normal breath sounds.   Musculoskeletal:      Cervical back: Normal range of motion.   Neurological:      Mental Status: She is alert.         Assessment/Plan      This 60-year-old patient presents today for follow-up of hypertension and diabetes mellitus type 2.  She relates she is strictly adhere to her diet and she is exercising more now.  She relates she feels fine she had no problems in the interim of visits.  Her hemoglobin A1c 6 months ago was elevated at 8.0.  3 months ago was down to 7.4.  Today it is down to 7.1.  Her blood pressure is well controlled today at 126/82 in the left arm large cuff.  In the interim visit she has not visited any urgent cares or emergency rooms.  Note is made she is gained about 5 pounds.            Diagnoses and all orders for this visit:    1. Essential hypertension (Primary)  Comments:  Controlled    2. Type 2 diabetes mellitus with hyperglycemia, without long-term current use of insulin (CMS/[facility])  Comments:  Controlled  Orders:  -     POCT glycated hemoglobin, total    3. Weight gain    Plan:  1.)  Continue current medications  2.)  Follow-up in 3 months for diabetes         Poncho Lee MD  09/02/2021

## 2021-09-13 RX ORDER — ALLOPURINOL 100 MG/1
TABLET ORAL
Qty: 60 TABLET | Refills: 3 | Status: SHIPPED | OUTPATIENT
Start: 2021-09-13 | End: 2022-01-24

## 2021-09-14 ENCOUNTER — TELEPHONE (OUTPATIENT)
Dept: FAMILY MEDICINE CLINIC | Facility: CLINIC | Age: 61
End: 2021-09-14

## 2021-09-14 NOTE — TELEPHONE ENCOUNTER
Caller: Katharine Mitchell    Relationship: Self    Best call back number: [phone]     What is the best time to reach you:ANYTIME    Who are you requesting to speak with (clinical staff, provider,  specific staff member):CLINICAL    Do you know the name of the person who called: KATHARINE    What was the call regarding: IT HAVING SYMPTOMS OF SINUS PRESSURE AND CONGESTION AND COLOR IN HER MUCUS AND FEELS SHE HAS A SINUS INFECTION. SHE IS ASKING IF YOU CAN SEND IN AN ANTIBIOTIC FOR HER.     Do you require a callback:  YES   [id number] - [place] [address] AT [facility] - [phone] [facility] [phone]   [phone]

## 2021-09-15 RX ORDER — AZITHROMYCIN 250 MG/1
TABLET, FILM COATED ORAL
Qty: 6 TABLET | Refills: 0 | Status: SHIPPED | OUTPATIENT
Start: 2021-09-15 | End: 2021-12-02

## 2021-09-15 NOTE — TELEPHONE ENCOUNTER
PATIENT STATES SHE HAS HAD SYMPTOMS SINCE Friday 09/10/2021 AND IS WAITING FOR AN UPDATE ON THIS REQUEST.    PLEASE ADVISE ASAP    CONTACT: [phone] (WILLIAM)

## 2021-09-22 NOTE — TELEPHONE ENCOUNTER
Medication requested (name and dose): glucose blood (ONE TOUCH ULTRA TEST) test strip    Pharm[facility] where [facility] be [id number] - [place] [id number] AT [facility] [phone] [facility] [phone]      Additional details provided by patient: FOUR TEST STRIPS LEFT     Best call back number: [phone]    Does the patient have less than a 3 day supply:  [x] Yes  [] No    Maty Durant  09/22/21, 13:04 EDT

## 2021-09-22 NOTE — TELEPHONE ENCOUNTER
Rx Refill Note  Requested Prescriptions     Pending Prescriptions Disp Refills   • glucose blood (ONE TOUCH ULTRA TEST) test strip        Last office visit with prescribing clinician: 9/2/2021      Next office visit with prescribing clinician: 12/2/2021            Gabriel Portillo MA  09/22/21, 13:38 EDT

## 2021-10-24 DIAGNOSIS — E78.00 PURE HYPERCHOLESTEROLEMIA: ICD-10-CM

## 2021-10-25 RX ORDER — ROSUVASTATIN CALCIUM 10 MG/1
TABLET, COATED ORAL
Qty: 30 TABLET | Refills: 5 | Status: SHIPPED | OUTPATIENT
Start: 2021-10-25 | End: 2022-12-20

## 2021-11-22 RX ORDER — SPIRONOLACTONE 25 MG/1
TABLET ORAL
Qty: 90 TABLET | Refills: 5 | Status: SHIPPED | OUTPATIENT
Start: 2021-11-22 | End: 2022-05-24

## 2021-12-02 ENCOUNTER — OFFICE VISIT (OUTPATIENT)
Dept: FAMILY MEDICINE CLINIC | Facility: CLINIC | Age: 61
End: 2021-12-02

## 2021-12-02 VITALS
BODY MASS INDEX: 53.92 KG/M2 | WEIGHT: 293 LBS | RESPIRATION RATE: 16 BRPM | SYSTOLIC BLOOD PRESSURE: 138 MMHG | HEIGHT: 62 IN | DIASTOLIC BLOOD PRESSURE: 96 MMHG

## 2021-12-02 DIAGNOSIS — I10 ESSENTIAL HYPERTENSION: Primary | Chronic | ICD-10-CM

## 2021-12-02 DIAGNOSIS — E11.65 TYPE 2 DIABETES MELLITUS WITH HYPERGLYCEMIA, WITHOUT LONG-TERM CURRENT USE OF INSULIN (HCC): Chronic | ICD-10-CM

## 2021-12-02 LAB
EXPIRATION DATE: ABNORMAL
HBA1C MFR BLD: 7.5 %
Lab: ABNORMAL

## 2021-12-02 PROCEDURE — 36416 COLLJ CAPILLARY BLOOD SPEC: CPT | Performed by: INTERNAL MEDICINE

## 2021-12-02 PROCEDURE — 83036 HEMOGLOBIN GLYCOSYLATED A1C: CPT | Performed by: INTERNAL MEDICINE

## 2021-12-02 PROCEDURE — 99214 OFFICE O/P EST MOD 30 MIN: CPT | Performed by: INTERNAL MEDICINE

## 2021-12-02 NOTE — PROGRESS NOTES
12/02/2021    CC: Diabetes (f/u) and Hypertension (f/u)  .        HPI  Diabetes  She has type 2 diabetes mellitus. No MedicAlert identification noted. Pertinent negatives for hypoglycemia include no confusion, dizziness, headaches, hunger, mood changes, nervousness/anxiousness, pallor, seizures, sleepiness, speech difficulty, sweats or tremors. Associated symptoms include foot paresthesias. Pertinent negatives for diabetes include no blurred vision, no chest pain, no fatigue, no foot ulcerations, no polydipsia, no polyphagia, no polyuria, no visual change, no weakness and no weight loss. Pertinent negatives for hypoglycemia complications include no blackouts, no hospitalization, no nocturnal hypoglycemia, no required assistance and no required glucagon injection. Symptoms are stable. Pertinent negatives for diabetic complications include no CVA, heart disease, impotence, nephropathy, peripheral neuropathy, PVD or retinopathy. Risk factors for coronary artery disease include hypertension, obesity and post-menopausal. Current diabetic treatment includes oral agent (dual therapy). She is compliant with treatment all of the time. Her weight is fluctuating minimally. She is following a high fiber diet. When asked about meal planning, she reported none. She has not had a previous visit with a dietitian. She participates in exercise weekly. She monitors blood glucose at home 1-2 x per day. Blood glucose monitoring compliance is fair. Her home blood glucose trend is decreasing steadily. Her breakfast blood glucose is taken after 10 am. Her breakfast blood glucose range is generally 130-140 mg/dl. Her lunch blood glucose is taken between 2-3 pm. Her lunch blood glucose range is generally 110-130 mg/dl. Her dinner blood glucose is taken between 7-8 pm. Her dinner blood glucose range is generally 110-130 mg/dl. Her highest blood glucose is 140-180 mg/dl. Her overall blood glucose range is 130-140 mg/dl. She does not see a  podiatrist.Eye exam is current.        Subjective   Katharine Mitchell is a 61 y.o. female.      The following portions of the patient's history were reviewed and updated as appropriate: allergies, current medications, past family history, past medical history, past social history, past surgical history and problem list.    Problem List  Patient Active Problem List   Diagnosis   • Abnormal EKG   • Essential hypertension   • Preop cardiovascular exam   • Osteoarthritis of right knee   • Osteoarthritis of left knee   • Type 2 diabetes mellitus (HCC)   • Class 3 obesity due to excess calories in adult   • CAROLE (obstructive sleep apnea)   • Screening for colon cancer   • Polyp of transverse colon   • Asthma       Past Medical History  Past Medical History:   Diagnosis Date   • Arthritis    • Diabetes mellitus (HCC)     TYPE 2   • Hypertension    • Knee pain    • Sleep apnea     DOES NOT WEAR CPAP   • UTI (urinary tract infection)     TREATED FOR UTI JAN 2017       Surgical History  Past Surgical History:   Procedure Laterality Date   • COLONOSCOPY     • COLONOSCOPY N/A 1/6/2020    Procedure: COLONOSCOPY TO CECUM WITH COLD BIOPSY POLYPECTOMY AND 2 ML SALINE LIFT INJECTION;  Surgeon: Eva Rizvi MD;  Location: [facility] ENDOSCOPY;  Service: General   • GALLBLADDER SURGERY     • JOINT REPLACEMENT     • KNEE ARTHROSCOPY Left    • PILONIDAL CYSTECTOMY     • OR TOTAL KNEE ARTHROPLASTY Right 2/20/2017    Procedure: RT TOTAL KNEE ARTHROPLASTY;  Surgeon: Lamont Morales MD;  Location: [facility] MAIN OR;  Service: Orthopedics   • TOTAL KNEE ARTHROPLASTY Left 5/14/2018    Procedure: LEFT TOTAL KNEE ARTHROPLASTY;  Surgeon: Lamont Morales MD;  Location: [facility] OR;  Service: Orthopedics       Family History  Family History   Problem Relation Age of Onset   • Malig Hyperthermia Neg Hx        Social History  Social History    Tobacco Use      Smoking status: Never Smoker      Smokeless tobacco: Never Used       Is the Patient a  current tobacco user? No    Allergies  Allergies   Allergen Reactions   • Zestril [Lisinopril] Swelling     LIP/ MOUTH SWELLING       Current Medications    Current Outpatient Medications:   •  allopurinol (ZYLOPRIM) 100 MG tablet, TAKE 1 TABLET BY MOUTH TWICE DAILY, Disp: 60 tablet, Rfl: 3  •  azelastine (ASTELIN) 0.1 % nasal spray, 1 spray into each nostril Every Evening., Disp: , Rfl:   •  Blood Glucose Monitoring Suppl (ONE TOUCH ULTRA 2) W/DEVICE kit, , Disp: , Rfl:   •  Breo Ellipta 100-25 MCG/INH inhaler, INL 1 PUFF PO DAILY. RM AFTER U, Disp: , Rfl:   •  fluticasone (FLONASE) 50 MCG/ACT nasal spray, 2 sprays into each nostril Every Morning., Disp: , Rfl:   •  furosemide (LASIX) 20 MG tablet, 1 tablet daily as needed, Disp: 20 tablet, Rfl: 3  •  glucose blood (ONE TOUCH ULTRA TEST) test strip, 1 bid, Disp: 100 each, Rfl: 5  •  Januvia 100 MG tablet, TAKE 1 TABLET BY MOUTH DAILY, Disp: 90 tablet, Rfl: 1  •  metFORMIN (GLUCOPHAGE) 500 MG tablet, 2 bid with food, Disp: 360 tablet, Rfl: 2  •  Multiple Vitamins-Minerals (MULTIVITAMIN ADULT PO), Take 1 tablet by mouth Every Morning. To stop before surgery, Disp: , Rfl:   •  NIFEdipine XL (PROCARDIA XL) 60 MG 24 hr tablet, Take 1 tablet by mouth 2 (Two) Times a Day., Disp: 180 tablet, Rfl: 2  •  OneTouch Delica Lancets 33G misc, Test glucose daily, Disp: 100 each, Rfl: 3  •  rosuvastatin (CRESTOR) 10 MG tablet, TAKE 1 TABLET BY MOUTH EVERY 3 DAYS, Disp: 30 tablet, Rfl: 5  •  spironolactone (ALDACTONE) 25 MG tablet, TAKE 2 TABLETS BY MOUTH EVERY MORNING AND 1 TABLET BY MOUTH EVERY EVENING, Disp: 90 tablet, Rfl: 5     Review of System  Review of Systems   Constitutional: Negative for fatigue and unexpected weight loss.   Eyes: Negative for blurred vision.   Cardiovascular: Negative for chest pain.   Endocrine: Negative for polydipsia, polyphagia and polyuria.   Genitourinary: Negative for impotence.   Skin: Negative for pallor.   Neurological: Negative for dizziness,  tremors, seizures, speech difficulty, weakness and confusion.   Psychiatric/Behavioral: The patient is not nervous/anxious.      I have reviewed and confirmed the accuracy of the ROS as documented by the MA/LPN/RN Poncho Lee MD    Vitals:    12/02/21 1140   BP: 138/96   Resp: 16     Body mass index is 53.77 kg/m².    Objective     Physical Exam  Physical Exam  Constitutional:       Appearance: Normal appearance.   Cardiovascular:      Rate and Rhythm: Normal rate and regular rhythm.      Pulses: Normal pulses.   Musculoskeletal:      Cervical back: Normal range of motion and neck supple.   Neurological:      Mental Status: She is alert.         Assessment/Plan        This 61-year-old patient presents for follow-up of diabetes mellitus type 2.  Last hemoglobin A1c done 3 months ago was 7.1.  She relates that her glucose levels although not provided today have been slowly creeping up.  Her hemoglobin A1c today is 7.4.  She currently is on Metformin 500 mg 2 tablets in the morning 1 in afternoon and Januvia 100 mg p.o. daily.  Her weight is stayed about the same at 294.    Her blood pressure on recheck by me in the left arm sitting position standard cuff was 130/80.  Patient contributes her worsening diabetic scores to her decreased activity and deviating from her diet.  She feels that she can improve these items and still refuses insulin.    We will see the patient back for follow-up in the next 3 months with improvement hopefully in her glucose numbers.              Diagnoses and all orders for this visit:    1. Type 2 diabetes mellitus with hyperglycemia, without long-term current use of insulin (HCC)  Comments:  Controlled  Orders:  -     POCT glycated hemoglobin, total      Plan:  1.)  We encouraged the patient to increase her activity and control better her diabetic food intake.  2.)  Follow-up in 3 months for reevaluation of diabetes.       Poncho Lee MD  12/02/2021 Answers for HPI/ROS submitted by  the patient on 12/1/2021  What is the primary reason for your visit?: Diabetes  Diabetes type: type 2  MedicAlert ID: No  blurred vision: No  chest pain: No  fatigue: No  foot paresthesias: Yes  foot ulcerations: No  polydipsia: No  polyphagia: No  polyuria: No  visual change: No  weakness: No  weight loss: No  Symptom course: stable  confusion: No  dizziness: No  headaches: No  hunger: No  mood changes: No  nervous/anxious: No  pallor: No  seizures: No  sleepiness: No  speech difficulty: No  sweats: No  tremors: No  blackouts: No  hospitalization: No  nocturnal hypoglycemia: No  required assistance: No  required glucagon: No  CVA: No  heart disease: No  impotence: No  nephropathy: No  peripheral neuropathy: No  PVD: No  retinopathy: No  CAD risks: hypertension, obesity, post-menopausal  Current treatments: oral agent (dual therapy)  Treatment compliance: all of the time  Home blood tests: 1-2 x per day  Monitoring compliance: fair  Blood glucose trend: decreasing steadily  breakfast time: after 10 am  breakfast glucose level: 130-140  lunch time: 2-3 pm  lunch glucose level: 110-130  dinner time: 7-8 pm  dinner glucose level: 110-130  High score: 140-180  Overall: 130-140  Weight trend: fluctuating minimally  Current diet: high fiber  Meal planning: none  Exercise: weekly  Dietitian visit: No  Eye exam current: Yes  Sees podiatrist: No

## 2021-12-14 DIAGNOSIS — I10 ESSENTIAL HYPERTENSION: ICD-10-CM

## 2021-12-14 RX ORDER — FUROSEMIDE 20 MG/1
TABLET ORAL
Qty: 20 TABLET | Refills: 3 | Status: SHIPPED | OUTPATIENT
Start: 2021-12-14 | End: 2022-09-26

## 2022-01-23 DIAGNOSIS — E11.65 TYPE 2 DIABETES MELLITUS WITH HYPERGLYCEMIA, UNSPECIFIED WHETHER LONG TERM INSULIN USE: ICD-10-CM

## 2022-01-23 DIAGNOSIS — E11.65 TYPE 2 DIABETES MELLITUS WITH HYPERGLYCEMIA, WITHOUT LONG-TERM CURRENT USE OF INSULIN: ICD-10-CM

## 2022-01-24 RX ORDER — ALLOPURINOL 100 MG/1
TABLET ORAL
Qty: 60 TABLET | Refills: 3 | Status: SHIPPED | OUTPATIENT
Start: 2022-01-24 | End: 2022-04-21

## 2022-01-24 RX ORDER — SITAGLIPTIN 100 MG/1
TABLET, FILM COATED ORAL
Qty: 90 TABLET | Refills: 1 | Status: SHIPPED | OUTPATIENT
Start: 2022-01-24 | End: 2022-07-27

## 2022-01-24 NOTE — TELEPHONE ENCOUNTER
Rx Refill Note  Requested Prescriptions     Pending Prescriptions Disp Refills   • Januvia 100 MG tablet [Pharmacy Med Name: JANUVIA 100MG TABLETS] 90 tablet 1     Sig: TAKE 1 TABLET BY MOUTH DAILY   • allopurinol (ZYLOPRIM) 100 MG tablet [Pharmacy Med Name: ALLOPURINOL 100MG TABLETS] 60 tablet 3     Sig: TAKE 1 TABLET BY MOUTH TWICE DAILY   • metFORMIN (GLUCOPHAGE) 500 MG tablet [Pharmacy Med Name: METFORMIN 500MG TABLETS] 360 tablet 2     Sig: TAKE 2 TABLETS BY MOUTH TWICE DAILY WITH FOOD      Last office visit with prescribing clinician: 12/2/2021      Next office visit with prescribing clinician: Visit date not found            Gabriel Portillo MA  01/24/22, 07:56 EST

## 2022-03-23 DIAGNOSIS — I10 ESSENTIAL HYPERTENSION: ICD-10-CM

## 2022-03-24 RX ORDER — NIFEDIPINE 60 MG/1
TABLET, EXTENDED RELEASE ORAL
Qty: 180 TABLET | Refills: 1 | Status: SHIPPED | OUTPATIENT
Start: 2022-03-24 | End: 2022-09-28 | Stop reason: SDUPTHER

## 2022-03-24 NOTE — TELEPHONE ENCOUNTER
Rx Refill Note  Requested Prescriptions     Pending Prescriptions Disp Refills   • NIFEdipine XL (PROCARDIA XL) 60 MG 24 hr tablet [Pharmacy Med Name: NIFEDIPINE 60MG ER (XL/OS) TABLETS] 180 tablet 2     Sig: TAKE 1 TABLET BY MOUTH TWICE DAILY      Last office visit with prescribing clinician: 12/2/2021      Next office visit with prescribing clinician: 3/31/2022            Gabriel Portillo MA  03/24/22, 08:12 EDT

## 2022-03-31 ENCOUNTER — OFFICE VISIT (OUTPATIENT)
Dept: FAMILY MEDICINE CLINIC | Facility: CLINIC | Age: 62
End: 2022-03-31

## 2022-03-31 VITALS
HEIGHT: 62 IN | BODY MASS INDEX: 52.01 KG/M2 | WEIGHT: 282.6 LBS | RESPIRATION RATE: 16 BRPM | DIASTOLIC BLOOD PRESSURE: 86 MMHG | SYSTOLIC BLOOD PRESSURE: 128 MMHG

## 2022-03-31 DIAGNOSIS — R20.2 TINGLING OF BOTH FEET: Primary | ICD-10-CM

## 2022-03-31 DIAGNOSIS — E11.65 TYPE 2 DIABETES MELLITUS WITH HYPERGLYCEMIA, WITHOUT LONG-TERM CURRENT USE OF INSULIN: Chronic | ICD-10-CM

## 2022-03-31 LAB
EXPIRATION DATE: ABNORMAL
HBA1C MFR BLD: 7 %
Lab: ABNORMAL

## 2022-03-31 PROCEDURE — 99214 OFFICE O/P EST MOD 30 MIN: CPT | Performed by: INTERNAL MEDICINE

## 2022-03-31 PROCEDURE — 36416 COLLJ CAPILLARY BLOOD SPEC: CPT | Performed by: INTERNAL MEDICINE

## 2022-03-31 PROCEDURE — 83036 HEMOGLOBIN GLYCOSYLATED A1C: CPT | Performed by: INTERNAL MEDICINE

## 2022-04-03 NOTE — PROGRESS NOTES
Answers for HPI/ROS submitted by the patient on 3/28/2022  What is the primary reason for your visit?: Diabetes  Diabetes type: type 2  MedicAlert ID: No  blurred vision: No  chest pain: No  fatigue: No  foot paresthesias: Yes  foot ulcerations: No  polydipsia: No  polyphagia: No  polyuria: No  visual change: No  weakness: No  weight loss: No  Symptom course: stable  confusion: No  dizziness: No  headaches: No  hunger: No  mood changes: No  nervous/anxious: No  pallor: No  seizures: No  sleepiness: No  speech difficulty: No  sweats: No  tremors: No  blackouts: No  hospitalization: No  nocturnal hypoglycemia: No  required assistance: No  required glucagon: No  CVA: No  heart disease: No  impotence: No  nephropathy: No  peripheral neuropathy: No  PVD: No  retinopathy: No  CAD risks: hypertension, obesity, post-menopausal  Current treatments: oral agent (dual therapy)  Treatment compliance: all of the time  Home blood tests: 1-2 x per day  Monitoring compliance: good  Blood glucose trend: decreasing steadily  breakfast time: after 10 am  breakfast glucose level: 140-180  lunch time: after 3 pm  lunch glucose level: 110-130  dinner time: 7-8 pm  dinner glucose level: 110-130  High score: 140-180  Overall: 110-130  Weight trend: decreasing steadily  Current diet: generally healthy  Meal planning: avoidance of concentrated sweets  Exercise: three times a week  Dietitian visit: No  Eye exam current: Yes  Sees podiatrist: No    03/31/2022    CC: Diabetes (F/U.  Tingling in toes on both feet) and Hypertension (F/U)  .        HPI  Diabetes  She has type 2 diabetes mellitus. No MedicAlert identification noted. Pertinent negatives for hypoglycemia include no confusion, dizziness, headaches, hunger, mood changes, nervousness/anxiousness, pallor, seizures, sleepiness, speech difficulty, sweats or tremors. Associated symptoms include foot paresthesias. Pertinent negatives for diabetes include no blurred vision, no chest pain, no  fatigue, no foot ulcerations, no polydipsia, no polyphagia, no polyuria, no visual change, no weakness and no weight loss. Pertinent negatives for hypoglycemia complications include no blackouts, no hospitalization, no nocturnal hypoglycemia, no required assistance and no required glucagon injection. Symptoms are stable. Pertinent negatives for diabetic complications include no CVA, heart disease, impotence, nephropathy, peripheral neuropathy, PVD or retinopathy. Risk factors for coronary artery disease include hypertension, obesity and post-menopausal. Current diabetic treatment includes oral agent (dual therapy). She is compliant with treatment all of the time. Her weight is decreasing steadily. She is following a generally healthy diet. Meal planning includes avoidance of concentrated sweets. She has not had a previous visit with a dietitian. She participates in exercise three times a week. She monitors blood glucose at home 1-2 x per day. Blood glucose monitoring compliance is good. Her home blood glucose trend is decreasing steadily. Her breakfast blood glucose is taken after 10 am. Her breakfast blood glucose range is generally 140-180 mg/dl. Her lunch blood glucose is taken after 3 pm. Her lunch blood glucose range is generally 110-130 mg/dl. Her dinner blood glucose is taken between 7-8 pm. Her dinner blood glucose range is generally 110-130 mg/dl. Her highest blood glucose is 140-180 mg/dl. Her overall blood glucose range is 110-130 mg/dl. She does not see a podiatrist.Eye exam is current.   Hypertension  Pertinent negatives include no blurred vision, chest pain, headaches or sweats. There is no history of CVA, PVD or retinopathy.        Ilene Mitchell is a 61 y.o. female.      The following portions of the patient's history were reviewed and updated as appropriate: allergies, current medications, past family history, past medical history, past social history, past surgical history and  problem list.    Problem List  Patient Active Problem List   Diagnosis   • Abnormal EKG   • Essential hypertension   • Preop cardiovascular exam   • Osteoarthritis of right knee   • Osteoarthritis of left knee   • Type 2 diabetes mellitus (HCC)   • Class 3 obesity due to excess calories in adult   • CAROLE (obstructive sleep apnea)   • Screening for colon cancer   • Polyp of transverse colon   • Asthma       Past Medical History  Past Medical History:   Diagnosis Date   • Arthritis    • Diabetes mellitus (HCC)     TYPE 2   • Hypertension    • Knee pain    • Sleep apnea     DOES NOT WEAR CPAP   • UTI (urinary tract infection)     TREATED FOR UTI JAN 2017       Surgical History  Past Surgical History:   Procedure Laterality Date   • COLONOSCOPY     • COLONOSCOPY N/A 1/6/2020    Procedure: COLONOSCOPY TO CECUM WITH COLD BIOPSY POLYPECTOMY AND 2 ML SALINE LIFT INJECTION;  Surgeon: Eva Rizvi MD;  Location: [facility] ENDOSCOPY;  Service: General   • GALLBLADDER SURGERY     • JOINT REPLACEMENT     • KNEE ARTHROSCOPY Left    • PILONIDAL CYSTECTOMY     • [place] TOTAL KNEE ARTHROPLASTY Right 2/20/2017    Procedure: RT TOTAL KNEE ARTHROPLASTY;  Surgeon: Lamont Morales MD;  Location: [facility] MAIN OR;  Service: Orthopedics   • TOTAL KNEE ARTHROPLASTY Left 5/14/2018    Procedure: LEFT TOTAL KNEE ARTHROPLASTY;  Surgeon: Lamont Morales MD;  Location: [facility] OR;  Service: Orthopedics       Family History  Family History   Problem Relation Age of Onset   • Malig Hyperthermia Neg Hx        Social History  Social History    Tobacco Use      Smoking status: Never Smoker      Smokeless tobacco: Never Used       Is the Patient a current tobacco user? No    Allergies  Allergies   Allergen Reactions   • Zestril [Lisinopril] Swelling     LIP/ MOUTH SWELLING       Current Medications    Current Outpatient Medications:   •  allopurinol (ZYLOPRIM) 100 MG tablet, TAKE 1 TABLET BY MOUTH TWICE DAILY, Disp: 60 tablet, Rfl: 3  •  azelastine  (ASTELIN) 0.1 % nasal spray, 1 spray into each nostril Every Evening., Disp: , Rfl:   •  Blood Glucose Monitoring Suppl (ONE TOUCH ULTRA 2) W/DEVICE kit, , Disp: , Rfl:   •  Breo Ellipta 100-25 MCG/INH inhaler, INL 1 PUFF PO DAILY. RM AFTER U, Disp: , Rfl:   •  fluticasone (FLONASE) 50 MCG/ACT nasal spray, 2 sprays into each nostril Every Morning., Disp: , Rfl:   •  furosemide (LASIX) 20 MG tablet, TAKE 1 TABLET BY MOUTH DAILY AS NEEDED, Disp: 20 tablet, Rfl: 3  •  glucose blood (ONE TOUCH ULTRA TEST) test strip, 1 bid, Disp: 100 each, Rfl: 5  •  Januvia 100 MG tablet, TAKE 1 TABLET BY MOUTH DAILY, Disp: 90 tablet, Rfl: 1  •  metFORMIN (GLUCOPHAGE) 500 MG tablet, TAKE 2 TABLETS BY MOUTH TWICE DAILY WITH FOOD, Disp: 360 tablet, Rfl: 2  •  Multiple Vitamins-Minerals (MULTIVITAMIN ADULT PO), Take 1 tablet by mouth Every Morning. To stop before surgery, Disp: , Rfl:   •  NIFEdipine XL (PROCARDIA XL) 60 MG 24 hr tablet, TAKE 1 TABLET BY MOUTH TWICE DAILY, Disp: 180 tablet, Rfl: 1  •  OneTouch Delica Lancets 33G misc, Test glucose daily, Disp: 100 each, Rfl: 3  •  rosuvastatin (CRESTOR) 10 MG tablet, TAKE 1 TABLET BY MOUTH EVERY 3 DAYS, Disp: 30 tablet, Rfl: 5  •  spironolactone (ALDACTONE) 25 MG tablet, TAKE 2 TABLETS BY MOUTH EVERY MORNING AND 1 TABLET BY MOUTH EVERY EVENING, Disp: 90 tablet, Rfl: 5     Review of System  Review of Systems   Constitutional: Negative for fatigue and unexpected weight loss.   Eyes: Negative for blurred vision.   Cardiovascular: Negative for chest pain.   Endocrine: Negative for polydipsia, polyphagia and polyuria.   Genitourinary: Negative for impotence.   Skin: Negative for pallor.   Neurological: Negative for dizziness, tremors, seizures, speech difficulty, weakness and confusion.   Psychiatric/Behavioral: The patient is not nervous/anxious.      I have reviewed and confirmed the accuracy of the ROS as documented by the MA/LPN/RN Poncho Lee MD    Vitals:    03/31/22 1132   BP:  128/86   Resp: 16     Body mass index is 51.69 kg/m².    Objective     Physical Exam  Physical Exam  Constitutional:       Appearance: Normal appearance.   HENT:      Head: Normocephalic and atraumatic.      Right Ear: Tympanic membrane normal.      Left Ear: Tympanic membrane normal.   Cardiovascular:      Rate and Rhythm: Normal rate and regular rhythm.      Pulses: Normal pulses.      Heart sounds: Normal heart sounds.   Pulmonary:      Effort: Pulmonary effort is normal.      Breath sounds: Normal breath sounds.   Musculoskeletal:      Cervical back: Normal range of motion and neck supple.   Neurological:      Mental Status: She is alert.         Assessment/Plan      This 61-year-old patient presents today for follow-up of diabetes mellitus type 2 and hypertension.  She relates that she is going to [employer] about 3 times a week now and is watching her diet very carefully.  Note is made she is lost 12 pounds from her last visit about 3 months or so ago.  Her hemoglobin A1c today is improved to 7.0 from her prior 7.5.  Her blood pressure is well controlled today at 128/86 in the left arm sitting position large cuff.    Patient relates that she has had some tingling in her feet for the past month or so.  This occurs only periodically.  She is able to ambulate without difficulty and it causes no spring pain.  Dorsalis pedis and anterior tibial pulses are normal.        Diagnoses and all orders for this visit:    1. Tingling of both feet (Primary)  -     Ambulatory Referral to Podiatry    2. Type 2 diabetes mellitus with hyperglycemia, without long-term current use of insulin (HCC)  Comments:  Poorly controlled  Orders:  -     POCT glycated hemoglobin, total      Plan:  1.)  Follow-up in 3 to 4 months.  2.)  Referral to podiatry for evaluation of tingling in the feet.       Poncho Lee MD  03/31/2022

## 2022-04-19 ENCOUNTER — TELEPHONE (OUTPATIENT)
Dept: FAMILY MEDICINE CLINIC | Facility: CLINIC | Age: 62
End: 2022-04-19

## 2022-04-19 DIAGNOSIS — N61.1 BOIL, BREAST: Primary | ICD-10-CM

## 2022-04-19 NOTE — TELEPHONE ENCOUNTER
Caller: Katharine Mitchell    Relationship: Self    Best call back number:[phone]    Who are you requesting to speak with (clinical staff, provider,  specific staff member): CLINICAL STAFF     What was the call regarding: PATIENT HAS A BOIL ON RIGHT BREAST AND WANTING TO KNOW WHAT SHE CAN DO FOR IT PLEASE CALL AND ADVISE

## 2022-04-21 ENCOUNTER — OFFICE VISIT (OUTPATIENT)
Dept: SURGERY | Facility: CLINIC | Age: 62
End: 2022-04-21

## 2022-04-21 VITALS — BODY MASS INDEX: 51.89 KG/M2 | HEIGHT: 62 IN | WEIGHT: 282 LBS

## 2022-04-21 DIAGNOSIS — N61.1 ABSCESS OF BREAST, RIGHT: Primary | ICD-10-CM

## 2022-04-21 PROCEDURE — 10061 I&D ABSCESS COMP/MULTIPLE: CPT | Performed by: SURGERY

## 2022-04-22 ENCOUNTER — LAB REQUISITION (OUTPATIENT)
Dept: LAB | Facility: HOSPITAL | Age: 62
End: 2022-04-22

## 2022-04-22 DIAGNOSIS — Z00.00 ENCOUNTER FOR GENERAL ADULT MEDICAL EXAMINATION WITHOUT ABNORMAL FINDINGS: ICD-10-CM

## 2022-04-22 PROCEDURE — 87070 CULTURE OTHR SPECIMN AEROBIC: CPT | Performed by: SURGERY

## 2022-04-22 PROCEDURE — 87205 SMEAR GRAM STAIN: CPT | Performed by: SURGERY

## 2022-04-22 RX ORDER — SULFAMETHOXAZOLE AND TRIMETHOPRIM 800; 160 MG/1; MG/1
1 TABLET ORAL 2 TIMES DAILY
Qty: 14 TABLET | Refills: 0 | Status: SHIPPED | OUTPATIENT
Start: 2022-04-22 | End: 2022-04-29

## 2022-04-22 NOTE — PROGRESS NOTES
General Surgery  Established Patient Office Visit    CC: Right breast abscess    HPI: The patient is a pleasant 61 y.o. year-old lady who presents today for evaluation of a boil on her right breast that she says has been present since January.  It was initially a small lump that has gradually gotten larger and recently became erythematous, painful, and fluctuant.  On exam, it appears to be an infected sebaceous cyst with no drainage.  She is not currently taking any antibiotics.  Nothing seems to make the symptoms better or worse and she denies any associated fever or chills.    Past Medical History:   Hypertension  Obstructive sleep apnea  Morbid obesity  Type 2 diabetes  History of colon polyps  Osteoarthritis of the knees    Past Surgical History:   Laparoscopic cholecystectomy  Left knee arthroplasty  Pilonidal cystectomy  Colonoscopy x2 (January 2020 by me - tubular adenoma transverse colon, single diverticula of transverse colon, internal hemorrhoids; 2015 by Dr. Khanna)    Medications:   Azelastine nasal spray as needed  Breo Ellipta inhaler 1 puff daily  Fluticasone nasal spray daily  Lasix 20 mg daily as needed for leg swelling  Januvia 100 mg daily  Metformin 1000 mg twice daily  Multivitamin once daily  Nifedipine 60 mg twice daily  Rosuvastatin 10 mg every 3 days  Spironolactone 25 mg every evening and 50 mg every morning    Allergies: Lisinopril (oral swelling)    Family History: Mother with breast cancer, brother with prostate cancer     Social History: Retired, single, non-smoker, no regular alcohol use    ROS:   Constitutional: Negative for fevers or chills  HENT: Negative for hearing loss or runny nose  Eyes: Negative for vision changes or scleral icterus  Respiratory: Negative for cough or shortness of breath  Cardiovascular: Negative for chest pain or heart palpitations  Gastrointestinal: Negative for abdominal distension, nausea, vomiting, constipation, melena, or hematochezia  Genitourinary:  Negative for hematuria or dysuria  Musculoskeletal: Negative for joint swelling or gait instability  Neurologic: Negative for tremors or seizures  Psychiatric: Negative for suicidal ideations or agitation  All other systems reviewed and negative    Physical Exam:  Height: 157 cm  Weight: 128 kg  BMI: 51.6  General: No acute distress, well-nourished & well-developed  HEAD: normocephalic, atraumatic  EYES: normal conjunctiva, sclera anicteric  EARS: grossly normal hearing  NECK: supple, no thyromegaly  CARDIOVASCULAR: regular rate and rhythm  RESPIRATORY: clear to auscultation bilaterally  GASTROINTESTINAL: soft, nontender, non-distended, morbidly obese  MUSCULOSKELETAL: normal gait and station. No gross extremity abnormalities  PSYCHIATRIC: oriented x3, normal mood and affect  BREAST: Right medial breast with infected sebaceous cyst measuring about 2 cm with erythema, fluctuance, and pain with palpation    Procedure Note:  Pre-Operative Diagnosis: Infected right breast sebaceous cyst  Post-Operative Diagnosis: Same  Procedure performed: Incision and drainage of infected right breast sebaceous cyst  Surgeon: Eva Rizvi MD  Assistant: None  Anesthesia: Local (1% Lidocaine with epinephrine)  Complications: None  EBL: Minimal  Specimens: Wound culture  Description of Procedure: The patient was brought to the minor procedure room and laid supine on exam table.  Her right medial breast was prepped and draped in a sterile fashion and a surgical timeout completed.  The skin overlying the infected cyst was anesthetized using 1% lidocaine with epinephrine and incised sharply, penetrating into the lumen of the cyst.  There was evacuation of a moderate amount of purulent fluid mixed with thick white sebum.  A portion of the sebaceous cyst was actually removed with a pearly white outer wall.  The cyst cavity was irrigated with warm normal saline and packed with 1/4 inch iodoform gauze.    ASSESSMENT & PLAN  Ms. Mitchell is  a 61-year-old lady with an infected sebaceous cyst of the right breast that I performed incision and drainage on today.  I obtained a wound culture, and prescribed Bactrim DS twice daily for 7 days.  We will call her early next week with the results of the wound culture to let her know if any changes to her antibiotic regimen will be necessary.  She can remove the iodoform packing tomorrow and should return to see me in about 4 weeks time for cyst excision here in the office to prevent future infections.    Eva Rizvi MD  General, Robotic, and Endoscopic Surgery  [facility]    [address], [address]  [place] [address]  P: [phone]  F: [phone]

## 2022-04-25 ENCOUNTER — TELEPHONE (OUTPATIENT)
Dept: SURGERY | Facility: CLINIC | Age: 62
End: 2022-04-25

## 2022-04-25 LAB
BACTERIA SPEC AEROBE CULT: NORMAL
GRAM STN SPEC: NORMAL
GRAM STN SPEC: NORMAL

## 2022-04-25 NOTE — TELEPHONE ENCOUNTER
----- Message from Eva Rizvi MD sent at 4/25/2022  1:35 PM EDT -----  Could someone please call Katharine and let her know the wound culture results show no major bacterial growth?  I would still like for her to complete the prescribed antibiotics to completion given the physical exam findings consistent with cellulitis.    FARAZ Jimenes

## 2022-05-19 ENCOUNTER — PROCEDURE VISIT (OUTPATIENT)
Dept: SURGERY | Facility: CLINIC | Age: 62
End: 2022-05-19

## 2022-05-19 VITALS — HEIGHT: 62 IN | WEIGHT: 282 LBS | BODY MASS INDEX: 51.89 KG/M2

## 2022-05-19 DIAGNOSIS — N60.01 CYST OF BREAST, RIGHT: Primary | ICD-10-CM

## 2022-05-19 DIAGNOSIS — L72.3 SEBACEOUS CYST: ICD-10-CM

## 2022-05-19 PROCEDURE — 11402 EXC TR-EXT B9+MARG 1.1-2 CM: CPT | Performed by: SURGERY

## 2022-05-23 NOTE — PROGRESS NOTES
Procedure Note:  Pre-Operative Diagnosis: Right breast recently infected sebaceous cyst  Post-Operative Diagnosis: Same  Procedure performed: Excision of 2 cm right breast sebaceous cyst  Surgeon: Eva Rizvi MD  Assistant: None  Anesthesia: Local (1% Lidocaine with epinephrine)  Complications: None  EBL: Minimal  Specimens: Right breast cyst  Indications for Procedure: Ms. Mitchell is a 61-year-old lady who came to see me recently with an infected sebaceous cyst of the medial right breast for which I performed an incision and drainage and packing with iodoform gauze.  The incision has healed now that the infection has completely cleared, and she presents today for definitive cyst excision to prevent future recurrent infections.  She understands the risks of the procedure and has consented to proceed.  Description of Procedure: The patient was brought to the minor procedure room and laid supine on exam table.  Her right breast medially was prepped and draped in a sterile fashion.  A surgical timeout was completed.  The palpable sebaceous cyst of the right breast was anesthetized using 1% lidocaine with epinephrine and excised via a transverse elliptical skin incision measuring about 2 cm in length.  The dissection was carried around the cyst down into the subcutaneous fat until the entirety of the cyst was removed and sent off to pathology in formalin.  The incision was closed primarily using interrupted 3-0 Vicryl deep dermal sutures followed by interrupted 4-0 nylon skin stitches and a Band-Aid.  She tolerated the procedure well and will return in 2 weeks to have the stitches removed by one of my medical assistants.    Eva Rizvi MD  General, Robotic, and Endoscopic Surgery  [facility]    [address] [place] [address], [address]  [place] [address]  P: [phone]  F: [phone]

## 2022-05-24 ENCOUNTER — TELEPHONE (OUTPATIENT)
Dept: SURGERY | Facility: CLINIC | Age: 62
End: 2022-05-24

## 2022-05-24 LAB
LAB AP CASE REPORT: NORMAL
LAB AP DIAGNOSIS COMMENT: NORMAL
PATH REPORT.FINAL DX SPEC: NORMAL
PATH REPORT.GROSS SPEC: NORMAL

## 2022-05-24 RX ORDER — SPIRONOLACTONE 25 MG/1
TABLET ORAL
Qty: 90 TABLET | Refills: 5 | Status: SHIPPED | OUTPATIENT
Start: 2022-05-24 | End: 2022-11-28

## 2022-05-24 NOTE — TELEPHONE ENCOUNTER
I spoke with the patient and informed her of her pathology results from her cyst excision per Dr. Rizvi. The patient voiced understanding.

## 2022-05-24 NOTE — TELEPHONE ENCOUNTER
----- Message from Eva Rizvi MD sent at 5/24/2022  1:29 PM EDT -----  Could someone please call the patient and let her know the pathology results came back as a benign sebaceous cyst, exactly as I expected?  There was nothing precancerous or cancerous found within the tissues.    ThanksFARAZ

## 2022-06-02 ENCOUNTER — CLINICAL SUPPORT (OUTPATIENT)
Dept: SURGERY | Facility: CLINIC | Age: 62
End: 2022-06-02

## 2022-06-02 NOTE — PROGRESS NOTES
Patient came into the office today for suture remvoal s/p excision of 2 cm right breast sebaceous cyst on 5/19/22 by Dr. Rizvi. All sutures were removed. Incision looked good with no signs of infection or skin problems. Steri-strips with Mastisol were applied to the incision. Patient was advised to avoid submerging in water for an additional week. She is aware to let her strips fall off.

## 2022-07-07 ENCOUNTER — OFFICE VISIT (OUTPATIENT)
Dept: FAMILY MEDICINE CLINIC | Facility: CLINIC | Age: 62
End: 2022-07-07

## 2022-07-07 VITALS
WEIGHT: 278 LBS | DIASTOLIC BLOOD PRESSURE: 90 MMHG | HEIGHT: 62 IN | SYSTOLIC BLOOD PRESSURE: 122 MMHG | RESPIRATION RATE: 16 BRPM | BODY MASS INDEX: 51.16 KG/M2

## 2022-07-07 DIAGNOSIS — R63.4 WEIGHT LOSS: ICD-10-CM

## 2022-07-07 DIAGNOSIS — E11.65 TYPE 2 DIABETES MELLITUS WITH HYPERGLYCEMIA, WITHOUT LONG-TERM CURRENT USE OF INSULIN: Primary | ICD-10-CM

## 2022-07-07 DIAGNOSIS — I10 ESSENTIAL HYPERTENSION: Chronic | ICD-10-CM

## 2022-07-07 LAB
EXPIRATION DATE: NORMAL
HBA1C MFR BLD: 6.6 %
Lab: NORMAL

## 2022-07-07 PROCEDURE — 99214 OFFICE O/P EST MOD 30 MIN: CPT | Performed by: INTERNAL MEDICINE

## 2022-07-07 PROCEDURE — 83036 HEMOGLOBIN GLYCOSYLATED A1C: CPT | Performed by: INTERNAL MEDICINE

## 2022-07-07 PROCEDURE — 36416 COLLJ CAPILLARY BLOOD SPEC: CPT | Performed by: INTERNAL MEDICINE

## 2022-07-07 RX ORDER — ALBUTEROL SULFATE 90 UG/1
2 AEROSOL, METERED RESPIRATORY (INHALATION)
COMMUNITY
End: 2022-07-07

## 2022-07-18 NOTE — PROGRESS NOTES
Answers for HPI/ROS submitted by the patient on 7/6/2022  What is the primary reason for your visit?: Diabetes  Diabetes type: type 2  MedicAlert ID: No  Disease duration: 15 years  blurred vision: No  chest pain: No  fatigue: No  foot paresthesias: Yes  foot ulcerations: No  polydipsia: No  polyphagia: No  polyuria: No  visual change: No  weakness: No  weight loss: No  Symptom course: improving  confusion: No  dizziness: No  headaches: No  hunger: No  mood changes: No  nervous/anxious: No  pallor: No  seizures: No  sleepiness: No  speech difficulty: No  sweats: No  tremors: No  blackouts: No  hospitalization: No  nocturnal hypoglycemia: No  required assistance: No  required glucagon: No  CVA: No  heart disease: No  impotence: No  nephropathy: No  peripheral neuropathy: No  PVD: No  retinopathy: No  CAD risks: hypertension, obesity, post-menopausal  Current treatments: oral agent (dual therapy)  Treatment compliance: all of the time  Home blood tests: 1-2 x per week  Home urines: <1 x per month  Monitoring compliance: good  Blood glucose trend: decreasing steadily  breakfast time: after 10 am  breakfast glucose level: 130-140  lunch time: after 3 pm  lunch glucose level: 110-130  dinner time: 7-8 pm  dinner glucose level: 110-130  High score: 140-180  Overall: 130-140  Weight trend: decreasing steadily  Current diet: low fat/cholesterol  Meal planning: carbohydrate counting  Exercise: three times a week  Dietitian visit: No  Eye exam current: Yes  Sees podiatrist: No    07/07/2022    CC: Diabetes (F/U--due for microalbumin---no other issues)  .        HPI  Diabetes  She has type 2 diabetes mellitus. No MedicAlert identification noted. The initial diagnosis of diabetes was made 15 years ago. Pertinent negatives for hypoglycemia include no confusion, dizziness, headaches, hunger, mood changes, nervousness/anxiousness, pallor, seizures, sleepiness, speech difficulty, sweats or tremors. Associated symptoms include foot  paresthesias. Pertinent negatives for diabetes include no blurred vision, no chest pain, no fatigue, no foot ulcerations, no polydipsia, no polyphagia, no polyuria, no visual change, no weakness and no weight loss. Pertinent negatives for hypoglycemia complications include no blackouts, no hospitalization, no nocturnal hypoglycemia, no required assistance and no required glucagon injection. Symptoms are improving. Pertinent negatives for diabetic complications include no CVA, heart disease, impotence, nephropathy, peripheral neuropathy, PVD or retinopathy. Risk factors for coronary artery disease include hypertension, obesity and post-menopausal. Current diabetic treatment includes oral agent (dual therapy). She is compliant with treatment all of the time. Her weight is decreasing steadily. She is following a low fat/cholesterol diet. Meal planning includes carbohydrate counting. She has not had a previous visit with a dietitian. She participates in exercise three times a week. She monitors blood glucose at home 1-2 x per week. She monitors urine at home <1 x per month. Blood glucose monitoring compliance is good. Her home blood glucose trend is decreasing steadily. Her breakfast blood glucose is taken after 10 am. Her breakfast blood glucose range is generally 130-140 mg/dl. Her lunch blood glucose is taken after 3 pm. Her lunch blood glucose range is generally 110-130 mg/dl. Her dinner blood glucose is taken between 7-8 pm. Her dinner blood glucose range is generally 110-130 mg/dl. Her highest blood glucose is 140-180 mg/dl. Her overall blood glucose range is 130-140 mg/dl. She does not see a podiatrist.Eye exam is current.        Subjective   Katharine Mitchell is a 61 y.o. female.      The following portions of the patient's history were reviewed and updated as appropriate: allergies, current medications, past family history, past medical history, past social history, past surgical history and problem  list.    Problem List  Patient Active Problem List   Diagnosis   • Abnormal EKG   • Essential hypertension   • Preop cardiovascular exam   • Osteoarthritis of right knee   • Osteoarthritis of left knee   • Type 2 diabetes mellitus (HCC)   • Class 3 obesity due to excess calories in adult   • CAROLE (obstructive sleep apnea)   • Screening for colon cancer   • Polyp of transverse colon   • Asthma       Past Medical History  Past Medical History:   Diagnosis Date   • Arthritis    • Asthma August 2020   • Cholelithiasis 1986    Gallbladder removed   • Diabetes mellitus (HCC)     TYPE 2   • Hypertension    • Knee pain    • Sleep apnea     DOES NOT WEAR CPAP   • UTI (urinary tract infection)     TREATED FOR UTI JAN 2017       Surgical History  Past Surgical History:   Procedure Laterality Date   • COLONOSCOPY     • COLONOSCOPY N/A 1/6/2020    Procedure: COLONOSCOPY TO CECUM WITH COLD BIOPSY POLYPECTOMY AND 2 ML SALINE LIFT INJECTION;  Surgeon: Eva Rizvi MD;  Location: [facility] ENDOSCOPY;  Service: General   • GALLBLADDER SURGERY     • JOINT REPLACEMENT     • KNEE ARTHROSCOPY Left    • PILONIDAL CYSTECTOMY     • RI TOTAL KNEE ARTHROPLASTY Right 2/20/2017    Procedure: RT TOTAL KNEE ARTHROPLASTY;  Surgeon: Lamont Morales MD;  Location: [facility] MAIN OR;  Service: Orthopedics   • TOTAL KNEE ARTHROPLASTY Left 5/14/2018    Procedure: LEFT TOTAL KNEE ARTHROPLASTY;  Surgeon: Lamont Morales MD;  Location: [facility] OR;  Service: Orthopedics       Family History  Family History   Problem Relation Age of Onset   • Arthritis Mother    • Cancer Mother    • Hypertension Mother    • Kidney disease Mother    • Hypertension Father    • Diabetes Paternal Grandmother    • Malig Hyperthermia Neg Hx        Social History  Social History    Tobacco Use      Smoking status: Never Smoker      Smokeless tobacco: Never Used       Is the Patient a current tobacco user? No    Allergies  Allergies   Allergen Reactions   • Zestril [Lisinopril]  Swelling     LIP/ MOUTH SWELLING       Current Medications    Current Outpatient Medications:   •  azelastine (ASTELIN) 0.1 % nasal spray, 1 spray into each nostril Every Evening., Disp: , Rfl:   •  Blood Glucose Monitoring Suppl (ONE TOUCH ULTRA 2) W/DEVICE kit, , Disp: , Rfl:   •  Breo Ellipta 100-25 MCG/INH inhaler, INL 1 PUFF PO DAILY. RM AFTER U, Disp: , Rfl:   •  fluticasone (FLONASE) 50 MCG/ACT nasal spray, 2 sprays into each nostril Every Morning., Disp: , Rfl:   •  furosemide (LASIX) 20 MG tablet, TAKE 1 TABLET BY MOUTH DAILY AS NEEDED, Disp: 20 tablet, Rfl: 3  •  glucose blood (ONE TOUCH ULTRA TEST) test strip, 1 bid, Disp: 100 each, Rfl: 5  •  Januvia 100 MG tablet, TAKE 1 TABLET BY MOUTH DAILY, Disp: 90 tablet, Rfl: 1  •  metFORMIN (GLUCOPHAGE) 500 MG tablet, TAKE 2 TABLETS BY MOUTH TWICE DAILY WITH FOOD, Disp: 360 tablet, Rfl: 2  •  Multiple Vitamins-Minerals (MULTIVITAMIN ADULT PO), Take 1 tablet by mouth Every Morning. To stop before surgery, Disp: , Rfl:   •  NIFEdipine XL (PROCARDIA XL) 60 MG 24 hr tablet, TAKE 1 TABLET BY MOUTH TWICE DAILY, Disp: 180 tablet, Rfl: 1  •  OneTouch Delica Lancets 33G misc, Test glucose daily, Disp: 100 each, Rfl: 3  •  rosuvastatin (CRESTOR) 10 MG tablet, TAKE 1 TABLET BY MOUTH EVERY 3 DAYS, Disp: 30 tablet, Rfl: 5  •  spironolactone (ALDACTONE) 25 MG tablet, TAKE 2 TABLETS BY MOUTH EVERY MORNING AND 1 TABLET EVERY EVENING, Disp: 90 tablet, Rfl: 5     Review of System  Review of Systems   Constitutional: Negative for fatigue and unexpected weight loss.   Eyes: Negative for blurred vision.   Cardiovascular: Negative for chest pain.   Endocrine: Negative for polydipsia, polyphagia and polyuria.   Genitourinary: Negative for impotence.   Skin: Negative for pallor.   Neurological: Negative for dizziness, tremors, seizures, speech difficulty, weakness and confusion.   Psychiatric/Behavioral: The patient is not nervous/anxious.      I have reviewed and confirmed the accuracy  of the ROS as documented by the MA/LPN/RN Poncho Lee MD    Vitals:    07/07/22 1151   BP: 122/90   Resp: 16     Body mass index is 50.85 kg/m².    Objective     Physical Exam  Physical Exam  HENT:      Head: Normocephalic.      Nose: Nose normal.   Cardiovascular:      Rate and Rhythm: Normal rate and regular rhythm.      Pulses: Normal pulses.      Heart sounds: Normal heart sounds.   Pulmonary:      Effort: Pulmonary effort is normal.      Breath sounds: Normal breath sounds.         Assessment & Plan      This 61-year-old presents at this time for follow-up of diabetes mellitus type 2 and hypertension.  She relates that she has doubled her efforts to exercise more and she is joined a [employer] class.  She also relates that she is eating much less.  We note that she is lost 4 pounds from her last visit about 6 weeks or so ago.  She relates she is taking her medication as prescribed.    Her previous hemoglobin A1c was 7.0 the level before that was 7.5.    Her blood pressure today was 122/90 in the left arm sitting position standard cuff.    Her hemoglobin A1c today is excellent at 6.6.    Overall she is doing well at this point.  Her blood pressure and diabetes control show a positive result.    Diagnoses and all orders for this visit:    1. Type 2 diabetes mellitus with hyperglycemia, without long-term current use of insulin (HCC) (Primary)  -     POCT glycated hemoglobin, total; Standing  -     POCT glycated hemoglobin, total         Plan:  1.)  Follow-up in 3 months  2.)  Continue current medications.  3.)  We encouraged the patient to keep up the good work.    Poncho Lee MD  07/07/2022

## 2022-07-27 DIAGNOSIS — E11.65 TYPE 2 DIABETES MELLITUS WITH HYPERGLYCEMIA, UNSPECIFIED WHETHER LONG TERM INSULIN USE: ICD-10-CM

## 2022-07-27 RX ORDER — SITAGLIPTIN 100 MG/1
TABLET, FILM COATED ORAL
Qty: 90 TABLET | Refills: 1 | Status: SHIPPED | OUTPATIENT
Start: 2022-07-27 | End: 2023-02-02

## 2022-07-27 NOTE — TELEPHONE ENCOUNTER
Rx Refill Note  Requested Prescriptions     Pending Prescriptions Disp Refills   • Januvia 100 MG tablet [Pharmacy Med Name: JANUVIA 100MG TABLETS] 90 tablet 1     Sig: TAKE 1 TABLET BY MOUTH DAILY      Last office visit with prescribing clinician: 7/7/2022      Next office visit with prescribing clinician: 10/7/2022            Gabriel Portillo MA  07/27/22, 10:33 EDT

## 2022-09-25 DIAGNOSIS — I10 ESSENTIAL HYPERTENSION: ICD-10-CM

## 2022-09-26 RX ORDER — FUROSEMIDE 20 MG/1
TABLET ORAL
Qty: 20 TABLET | Refills: 3 | Status: SHIPPED | OUTPATIENT
Start: 2022-09-26

## 2022-09-28 DIAGNOSIS — I10 ESSENTIAL HYPERTENSION: ICD-10-CM

## 2022-09-28 RX ORDER — NIFEDIPINE 60 MG/1
60 TABLET, EXTENDED RELEASE ORAL 2 TIMES DAILY
Qty: 180 TABLET | Refills: 1 | Status: SHIPPED | OUTPATIENT
Start: 2022-09-28 | End: 2023-03-30 | Stop reason: SDUPTHER

## 2022-10-31 DIAGNOSIS — E11.65 TYPE 2 DIABETES MELLITUS WITH HYPERGLYCEMIA, WITHOUT LONG-TERM CURRENT USE OF INSULIN: ICD-10-CM

## 2022-10-31 NOTE — TELEPHONE ENCOUNTER
Rx Refill Note  Requested Prescriptions     Pending Prescriptions Disp Refills   • metFORMIN (GLUCOPHAGE) 500 MG tablet [Pharmacy Med Name: METFORMIN 500MG TABLETS] 360 tablet 2     Sig: TAKE 2 TABLETS BY MOUTH TWICE DAILY WITH FOOD      Last office visit with prescribing clinician: 7/7/2022      Next office visit with prescribing clinician: 12/29/2022            Autumn Delgado MA  10/31/22, 11:43 EDT

## 2022-11-28 RX ORDER — SPIRONOLACTONE 25 MG/1
TABLET ORAL
Qty: 90 TABLET | Refills: 5 | Status: SHIPPED | OUTPATIENT
Start: 2022-11-28

## 2022-12-19 DIAGNOSIS — E78.00 PURE HYPERCHOLESTEROLEMIA: ICD-10-CM

## 2022-12-20 RX ORDER — ROSUVASTATIN CALCIUM 10 MG/1
TABLET, COATED ORAL
Qty: 30 TABLET | Refills: 5 | Status: SHIPPED | OUTPATIENT
Start: 2022-12-20

## 2022-12-29 ENCOUNTER — OFFICE VISIT (OUTPATIENT)
Dept: FAMILY MEDICINE CLINIC | Facility: CLINIC | Age: 62
End: 2022-12-29

## 2022-12-29 VITALS — WEIGHT: 278 LBS | HEIGHT: 62 IN | BODY MASS INDEX: 51.16 KG/M2 | RESPIRATION RATE: 16 BRPM

## 2022-12-29 DIAGNOSIS — E11.65 TYPE 2 DIABETES MELLITUS WITH HYPERGLYCEMIA, WITHOUT LONG-TERM CURRENT USE OF INSULIN: ICD-10-CM

## 2022-12-29 DIAGNOSIS — I10 ESSENTIAL HYPERTENSION: Primary | ICD-10-CM

## 2022-12-29 PROBLEM — G61.9 INFLAMMATORY AND TOXIC NEUROPATHY: Status: ACTIVE | Noted: 2022-12-29

## 2022-12-29 PROBLEM — G62.2 INFLAMMATORY AND TOXIC NEUROPATHY (HCC): Status: ACTIVE | Noted: 2022-12-29

## 2022-12-29 PROBLEM — E11.42 DIABETIC PERIPHERAL NEUROPATHY ASSOCIATED WITH TYPE 2 DIABETES MELLITUS: Status: ACTIVE | Noted: 2022-12-29

## 2022-12-29 LAB
EXPIRATION DATE: NORMAL
HBA1C MFR BLD: 6.9 %
Lab: NORMAL

## 2022-12-29 PROCEDURE — 99213 OFFICE O/P EST LOW 20 MIN: CPT | Performed by: INTERNAL MEDICINE

## 2022-12-29 PROCEDURE — 83036 HEMOGLOBIN GLYCOSYLATED A1C: CPT | Performed by: INTERNAL MEDICINE

## 2022-12-29 NOTE — PROGRESS NOTES
12/29/2022    CC: Diabetes (F/U--unable to hear bp) and Hypertension (F/U--no other issues)  .        HPI  History of Present Illness     Subjective   Katharine Mitchell is a 62 y.o. female.      The following portions of the patient's history were reviewed and updated as appropriate: allergies, current medications, past family history, past medical history, past social history, past surgical history and problem list.    Problem List  Patient Active Problem List   Diagnosis   • Abnormal EKG   • Essential hypertension   • Preop cardiovascular exam   • Osteoarthritis of right knee   • Osteoarthritis of left knee   • Type 2 diabetes mellitus (HCC)   • Class 3 obesity due to excess calories in adult   • CAROLE (obstructive sleep apnea)   • Screening for colon cancer   • Polyp of transverse colon   • Asthma   • Diabetic peripheral neuropathy associated with type 2 diabetes mellitus (HCC)   • Inflammatory and toxic neuropathy (HCC)       Past Medical History  Past Medical History:   Diagnosis Date   • Allergic     Zestril   • Arthritis    • Asthma August 2020   • Cholelithiasis 1986    Gallbladder removed   • Diabetes mellitus (HCC)     TYPE 2   • Hypertension    • Knee pain    • Obesity    • Sleep apnea     DOES NOT WEAR CPAP   • UTI (urinary tract infection)     TREATED FOR UTI JAN 2017       Surgical History  Past Surgical History:   Procedure Laterality Date   • CHOLECYSTECTOMY     • COLONOSCOPY     • COLONOSCOPY N/A 01/06/2020    Procedure: COLONOSCOPY TO CECUM WITH COLD BIOPSY POLYPECTOMY AND 2 ML SALINE LIFT INJECTION;  Surgeon: Eva Rizvi MD;  Location: [facility] ENDOSCOPY;  Service: General   • GALLBLADDER SURGERY     • JOINT REPLACEMENT     • KNEE ARTHROSCOPY Left    • PILONIDAL CYSTECTOMY     • MA TOTAL KNEE ARTHROPLASTY Right 02/20/2017    Procedure: RT TOTAL KNEE ARTHROPLASTY;  Surgeon: Lamont Morales MD;  Location: [facility] MAIN OR;  Service: Orthopedics   • TOTAL KNEE ARTHROPLASTY Left 05/14/2018     Procedure: LEFT TOTAL KNEE ARTHROPLASTY;  Surgeon: Lamont Morales MD;  Location: [facility] MAIN OR;  Service: Orthopedics       Family History  Family History   Problem Relation Age of Onset   • Arthritis Mother    • Cancer Mother    • Hypertension Mother    • Kidney disease Mother    • Hypertension Father    • Diabetes Paternal Grandmother    • Malig Hyperthermia Neg Hx        Social History  Social History    Tobacco Use      Smoking status: Never      Smokeless tobacco: Never       Is the Patient a current tobacco user? No    Allergies  Allergies   Allergen Reactions   • Zestril [Lisinopril] Swelling     LIP/ MOUTH SWELLING       Current Medications    Current Outpatient Medications:   •  azelastine (ASTELIN) 0.1 % nasal spray, 1 spray into each nostril Every Evening., Disp: , Rfl:   •  Blood Glucose Monitoring Suppl (ONE TOUCH ULTRA 2) W/DEVICE kit, , Disp: , Rfl:   •  Breo Ellipta 100-25 MCG/INH inhaler, INL 1 PUFF PO DAILY. RM AFTER U, Disp: , Rfl:   •  fluticasone (FLONASE) 50 MCG/ACT nasal spray, 2 sprays into each nostril Every Morning., Disp: , Rfl:   •  furosemide (LASIX) 20 MG tablet, TAKE 1 TABLET BY MOUTH DAILY AS NEEDED, Disp: 20 tablet, Rfl: 3  •  glucose blood (ONE TOUCH ULTRA TEST) test strip, 1 bid, Disp: 100 each, Rfl: 5  •  Januvia 100 MG tablet, TAKE 1 TABLET BY MOUTH DAILY, Disp: 90 tablet, Rfl: 1  •  metFORMIN (GLUCOPHAGE) 500 MG tablet, TAKE 2 TABLETS BY MOUTH TWICE DAILY WITH FOOD, Disp: 360 tablet, Rfl: 2  •  Multiple Vitamins-Minerals (MULTIVITAMIN ADULT PO), Take 1 tablet by mouth Every Morning. To stop before surgery, Disp: , Rfl:   •  NIFEdipine XL (PROCARDIA XL) 60 MG 24 hr tablet, Take 1 tablet by mouth 2 (Two) Times a Day., Disp: 180 tablet, Rfl: 1  •  OneTouch Delica Lancets 33G misc, Test glucose daily, Disp: 100 each, Rfl: 3  •  rosuvastatin (CRESTOR) 10 MG tablet, TAKE 1 TABLET BY MOUTH EVERY 3 DAYS, Disp: 30 tablet, Rfl: 5  •  spironolactone (ALDACTONE) 25 MG tablet, TAKE 2 TABLETS  BY MOUTH EVERY MORNING AND 1 TABLET IN THE EVENING, Disp: 90 tablet, Rfl: 5  •  sulfamethoxazole-trimethoprim (BACTRIM DS,SEPTRA DS) 800-160 MG per tablet, Take 1 tablet by mouth 2 (Two) Times a Day., Disp: 14 tablet, Rfl: 0     Review of System  Review of Systems  I have reviewed and confirmed the accuracy of the ROS as documented by the MA/LPN/RN Poncho Lee MD    Vitals:    12/29/22 1531   Resp: 16     Body mass index is 50.85 kg/m².    Objective     Physical Exam  Physical Exam    Assessment & Plan      This 62-year-old patient presents today for follow-up of diabetes mellitus type 2 and hypertension.  She relates she is feeling fine having had no problems in the interim of visits.  Last hemoglobin A1c was excellent at 6.53 months or so ago.  Her blood pressure at that time was also excellent at 118/88.    Her hemoglobin A1c today was 6.9.  Her blood pressure was 142/80 but should be noted she had a high sodium meal prior to her visit today.  Diagnoses and all orders for this visit:    1. Essential hypertension (Primary)    2. Type 2 diabetes mellitus with hyperglycemia, without long-term current use of insulin ([facility])  -     POCT glycated hemoglobin, total  -     MicroAlbumin, Urine, Random - Urine, Clean Catch      Plan:  1.)  Follow-up in 3 to 4 months for diabetes reevaluation.  2.)  Continue Spironolactone 25 mg 1 tab p.o. twice daily plus nifedipine 60 mg p.o. daily for hypertension.       Poncho Lee MD  12/29/2022  Answers for HPI/ROS submitted by the patient on 12/28/2022  What is the primary reason for your visit?: Diabetes

## 2023-02-01 DIAGNOSIS — E11.65 TYPE 2 DIABETES MELLITUS WITH HYPERGLYCEMIA, UNSPECIFIED WHETHER LONG TERM INSULIN USE: ICD-10-CM

## 2023-02-02 RX ORDER — SITAGLIPTIN 100 MG/1
TABLET, FILM COATED ORAL
Qty: 90 TABLET | Refills: 1 | Status: SHIPPED | OUTPATIENT
Start: 2023-02-02 | End: 2023-02-08

## 2023-02-02 NOTE — TELEPHONE ENCOUNTER
Rx Refill Note  Requested Prescriptions     Pending Prescriptions Disp Refills   • Januvia 100 MG tablet [Pharmacy Med Name: JANUVIA 100MG TABLETS] 90 tablet 1     Sig: TAKE 1 TABLET BY MOUTH DAILY      Last office visit with prescribing clinician: 12/29/2022   Last telemedicine visit with prescribing clinician: 3/30/2023   Next office visit with prescribing clinician: 3/30/2023                         Would you like a call back once the refill request has been completed: [] Yes [] No    If the office needs to give you a call back, can they leave a voicemail: [] Yes [] No    Gabriel Portillo MA  02/02/23, 08:05 EST

## 2023-02-07 ENCOUNTER — TELEPHONE (OUTPATIENT)
Dept: FAMILY MEDICINE CLINIC | Facility: CLINIC | Age: 63
End: 2023-02-07

## 2023-02-07 NOTE — TELEPHONE ENCOUNTER
Caller: Katharine Mitchell    Relationship: Self    Best call back number: [phone]    What was the call regarding: PATIENT STATES THAT [facility] WAS SENDING AN URGENT PRIOR AUTH REQUEST OVER FOR THE HER JANUVIA. PATIENT WILL BE OUT OF MEDICATION THIS Thursday, PLEASE ADVISE ON PRIOR AUTH STATUS.     Do you require a callback: YES

## 2023-02-08 RX ORDER — DAPAGLIFLOZIN 5 MG/1
5 TABLET, FILM COATED ORAL DAILY
Qty: 90 TABLET | Refills: 2 | Status: SHIPPED | OUTPATIENT
Start: 2023-02-08

## 2023-02-13 NOTE — TELEPHONE ENCOUNTER
PATIENT STATES SHE IS OUT OF JANUVIA.  [employer] IS SENDING OVER ANOTHER PRIOR AUTH TODAY. BE ON THE LOOK OUT.

## 2023-02-21 ENCOUNTER — TELEPHONE (OUTPATIENT)
Dept: FAMILY MEDICINE CLINIC | Facility: CLINIC | Age: 63
End: 2023-02-21

## 2023-02-21 RX ORDER — FLUCONAZOLE 150 MG/1
150 TABLET ORAL ONCE
Qty: 1 TABLET | Refills: 0 | Status: SHIPPED | OUTPATIENT
Start: 2023-02-21 | End: 2023-02-21

## 2023-02-21 NOTE — TELEPHONE ENCOUNTER
Caller: Katharine Mitchell    Relationship: Self    Best call back number: [phone]    What medication are you requesting: DIFLUCAN     What are your current symptoms: YEAST INFECTION FROM MEDICATION TRADJENTA     If a prescription is needed, what is your preferred pharmacy and phone number: [id number] [place], [place] - [facility] AT [facility] [phone] [facility] [phone]      Additional notes: WANTS TO KNOW SHOULD SHE CONTINUE TO TAKE THE TRADJENTA IF IT GAVE HER A YEAST INFECTION

## 2023-02-28 ENCOUNTER — TELEPHONE (OUTPATIENT)
Dept: FAMILY MEDICINE CLINIC | Facility: CLINIC | Age: 63
End: 2023-02-28

## 2023-02-28 RX ORDER — FLUCONAZOLE 200 MG/1
TABLET ORAL
Qty: 1 TABLET | Refills: 0 | Status: SHIPPED | OUTPATIENT
Start: 2023-02-28 | End: 2023-03-30

## 2023-02-28 NOTE — TELEPHONE ENCOUNTER
Caller: Katharine Mitchell    Relationship: Self    Best call back number: [phone]    Who are you requesting to speak with (clinical staff, provider,  specific staff member): DR BECKFORD OR MA    What was the call regarding: PATIENT STATES THAT SHE WAS PRESCRIBED DIFLUCAN TO TREAT A YEAST INFECTION THAT WAS CAUSED AS A SIDE EFFECT OF linagliptin (Tradjenta) 5 MG tablet tablet. SHE FINISHED THE DIFLUCAN, BUT THE YEAST INFECTION HAS NOT CLEARED UP. REQUESTS A CALL BACK TO DISCUSS FURTHER TREATMENT OPTIONS    Do you require a callback: YES

## 2023-03-28 NOTE — PROGRESS NOTES
Procedure(s):  RT TOTAL KNEE ARTHROPLASTY     LOS: 3 days     Subjective :   Complains of pain, better today.    Objective :    Vital signs in last 24 hours:  Vitals:    02/22/17 1612 02/22/17 1942 02/22/17 2305 02/23/17 0334   BP: 152/66 165/88 139/80 160/84   BP Location: Left arm Left arm Left arm Left arm   Patient Position: Lying  Lying Lying   Pulse: 80 117 103 110   Resp: 16 18 18 18   Temp:  98.8 °F (37.1 °C) 98.6 °F (37 °C) 97.6 °F (36.4 °C)   TempSrc:  Oral Oral Oral   SpO2: 98% 93% 94% 95%   Weight:       Height:           PHYSICAL EXAM:  Patient is calm, in no acute distress, awake and oriented x 3.  Dressing is clean, dry and intact.  No signs of infection.  Swelling is appropriate in amount.  Ecchymosis is appropriate in amount.  Homans test is negative.  Patient is neurovascularly intact distally.    LABS:    Results from last 7 days  Lab Units 02/23/17  0323   HEMOGLOBIN g/dL 12.1   HEMATOCRIT % 34.5*       Results from last 7 days  Lab Units 02/21/17  0352   SODIUM mmol/L 133*   POTASSIUM mmol/L 4.9   CHLORIDE mmol/L 93*   TOTAL CO2 mmol/L 22.4   BUN mg/dL 12   CREATININE mg/dL 0.87   GLUCOSE mg/dL 196*   CALCIUM mg/dL 8.7           ASSESSMENT:  Status post Procedure(s):  RT TOTAL KNEE ARTHROPLASTY      Plan:  Continue Physical Therapy, increase mobility and range of motion as tolerated.  Continue SCDs, Continue Lovenox for DVT prophylaxis while inpatient.  Switch to ASA as outpatient.  Dispo planning for home today.    Lamont Morales MD    Date: 2/23/2017  Time: 6:26 AM   Anesthesia Post Evaluation    Patient: Nicholas Kamar    Procedure(s) Performed: Procedure(s) (LRB):  COLONOSCOPY W/ EMR (N/A)    Final Anesthesia Type: general      Patient location during evaluation: GI PACU  Patient participation: Yes- Able to Participate  Level of consciousness: awake and alert  Post-procedure vital signs: reviewed and stable  Pain management: adequate  Airway patency: patent    PONV status at discharge: No PONV  Anesthetic complications: no      Cardiovascular status: stable  Respiratory status: unassisted  Hydration status: euvolemic  Follow-up not needed.          Vitals Value Taken Time   /85 03/28/23 1000   Temp 36.1 °C (97 °F) 03/28/23 0923   Pulse 52 03/28/23 1000   Resp 20 03/28/23 1000   SpO2 96 % 03/28/23 1000         Event Time   Out of Recovery 10:00:00         Pain/Tania Score: Tania Score: 10 (3/28/2023  9:31 AM)

## 2023-03-30 ENCOUNTER — OFFICE VISIT (OUTPATIENT)
Dept: FAMILY MEDICINE CLINIC | Facility: CLINIC | Age: 63
End: 2023-03-30
Payer: COMMERCIAL

## 2023-03-30 VITALS
HEIGHT: 62 IN | SYSTOLIC BLOOD PRESSURE: 128 MMHG | DIASTOLIC BLOOD PRESSURE: 84 MMHG | WEIGHT: 260.6 LBS | BODY MASS INDEX: 47.96 KG/M2

## 2023-03-30 DIAGNOSIS — I10 ESSENTIAL HYPERTENSION: ICD-10-CM

## 2023-03-30 DIAGNOSIS — E11.65 TYPE 2 DIABETES MELLITUS WITH HYPERGLYCEMIA, UNSPECIFIED WHETHER LONG TERM INSULIN USE: Primary | ICD-10-CM

## 2023-03-30 DIAGNOSIS — M17.11 PRIMARY OSTEOARTHRITIS OF RIGHT KNEE: ICD-10-CM

## 2023-03-30 DIAGNOSIS — R20.2 PARESTHESIA OF BOTH FEET: ICD-10-CM

## 2023-03-30 DIAGNOSIS — E11.65 TYPE 2 DIABETES MELLITUS WITH HYPERGLYCEMIA, WITHOUT LONG-TERM CURRENT USE OF INSULIN: ICD-10-CM

## 2023-03-30 PROCEDURE — 99214 OFFICE O/P EST MOD 30 MIN: CPT | Performed by: INTERNAL MEDICINE

## 2023-03-30 RX ORDER — NIFEDIPINE 60 MG/1
60 TABLET, EXTENDED RELEASE ORAL 2 TIMES DAILY
Qty: 180 TABLET | Refills: 3 | Status: SHIPPED | OUTPATIENT
Start: 2023-03-30

## 2023-03-30 RX ORDER — NIFEDIPINE 60 MG/1
TABLET, EXTENDED RELEASE ORAL
Qty: 180 TABLET | Refills: 1 | OUTPATIENT
Start: 2023-03-30

## 2023-03-30 RX ORDER — NAPROXEN 500 MG/1
1 TABLET ORAL EVERY 12 HOURS SCHEDULED
COMMUNITY
Start: 2022-12-21

## 2023-03-30 RX ORDER — ROSUVASTATIN CALCIUM 10 MG/1
1 TABLET, COATED ORAL
COMMUNITY
Start: 2022-12-20

## 2023-03-30 RX ORDER — FLUTICASONE PROPIONATE AND SALMETEROL 50; 100 UG/1; UG/1
POWDER RESPIRATORY (INHALATION)
COMMUNITY
Start: 2023-03-07

## 2023-03-30 RX ORDER — FUROSEMIDE 20 MG/1
20 TABLET ORAL
COMMUNITY
Start: 2022-12-30

## 2023-03-30 RX ORDER — LANCETS 33 GAUGE
EACH MISCELLANEOUS
Qty: 100 EACH | Refills: 5 | Status: SHIPPED | OUTPATIENT
Start: 2023-03-30

## 2023-03-30 RX ORDER — FLUCONAZOLE 150 MG/1
150 TABLET ORAL ONCE
COMMUNITY
Start: 2023-02-22

## 2023-03-30 RX ORDER — FLUCONAZOLE 200 MG/1
TABLET ORAL
Qty: 3 TABLET | Refills: 0 | Status: SHIPPED | OUTPATIENT
Start: 2023-03-30

## 2023-03-30 NOTE — PROGRESS NOTES
Answers for HPI/ROS submitted by the patient on 3/29/2023  What is the primary reason for your visit?: Diabetes  Diabetes type: type 2  MedicAlert ID: No  Disease duration: 2000 Years  blurred vision: No  chest pain: No  fatigue: No  foot paresthesias: Yes  foot ulcerations: No  polydipsia: No  polyphagia: No  polyuria: No  visual change: No  weakness: No  weight loss: No  Symptom course: improving  confusion: No  dizziness: No  headaches: No  hunger: No  mood changes: No  nervous/anxious: No  pallor: No  seizures: No  sleepiness: No  speech difficulty: No  sweats: No  tremors: No  blackouts: No  hospitalization: No  nocturnal hypoglycemia: No  required assistance: No  required glucagon: No  CVA: No  heart disease: No  impotence: No  nephropathy: No  peripheral neuropathy: No  PVD: No  retinopathy: No  CAD risks: hypertension, obesity, tobacco exposure  Current treatments: oral agent (dual therapy)  Treatment compliance: most of the time  Home blood tests: 1-2 x per day  Monitoring compliance: adequate  Blood glucose trend: increasing steadily  breakfast time: after 10 am  breakfast glucose level: 110-130  lunch time: after 3 pm  lunch glucose level: 110-130  dinner time: 6-7 pm  dinner glucose level: 110-130  High score: 140-180  Overall: 110-130  Weight trend: fluctuating minimally  Current diet: generally healthy  Meal planning: carbohydrate counting  Exercise: intermittently  Dietitian visit: No  Eye exam current: Yes  Sees podiatrist: No    03/30/2023    Assessment & Plan      This 62-year-old patient presents today for follow-up of diabetes mellitus type 2 and hypertension.  She relates she is taking her medication as prescribed.  With her last visit we switched her to Tradjenta and discontinued the Januvia.  Since then she relates she has had numerous yeast  infections she also relates that her glucose levels have increased steadily.    Patient's blood pressure was 128/84 in the left arm sitting position  standard cuff.  Her BMI was elevated at 47.7.    Her last A1c was 6.9 back on 12/29    Her current A1c was elevated at 7.1.    Plan:  1.)  DC Tradjenta  2.)  Restart Januvia 100 mg 1 tab p.o. every morning.  3.)  Renew strips and lancets  4.)  Renew nifedipine twice daily  5.)  Diflucan 200 mg for yeast infection  6.)  Follow-up in 3 months for reevaluation of diabetes  She  Diagnoses and all orders for this visit:    1. Type 2 diabetes mellitus with hyperglycemia, unspecified whether long term insulin use (HCC) (Primary)    2. Primary osteoarthritis of right knee    3. Essential hypertension             CC: Diabetes (Check a1c)  .        HPI  Diabetes  She has type 2 diabetes mellitus. No MedicAlert identification noted. The initial diagnosis of diabetes was made 2000 Years ago. Pertinent negatives for hypoglycemia include no confusion, dizziness, headaches, hunger, mood changes, nervousness/anxiousness, pallor, seizures, sleepiness, speech difficulty, sweats or tremors. Associated symptoms include foot paresthesias. Pertinent negatives for diabetes include no blurred vision, no chest pain, no fatigue, no foot ulcerations, no polydipsia, no polyphagia, no polyuria, no visual change, no weakness and no weight loss. Pertinent negatives for hypoglycemia complications include no blackouts, no hospitalization, no nocturnal hypoglycemia, no required assistance and no required glucagon injection. Symptoms are improving. Pertinent negatives for diabetic complications include no CVA, heart disease, impotence, nephropathy, peripheral neuropathy, PVD or retinopathy. Risk factors for coronary artery disease include hypertension, obesity and tobacco exposure. Current diabetic treatment includes oral agent (dual therapy). She is compliant with treatment most of the time. Her weight is fluctuating minimally. She is following a generally healthy diet. Meal planning includes carbohydrate counting. She has not had a previous visit  with a dietitian. She participates in exercise intermittently. She monitors blood glucose at home 1-2 x per day. Blood glucose monitoring compliance is adequate. Her home blood glucose trend is increasing steadily. Her breakfast blood glucose is taken after 10 am. Her breakfast blood glucose range is generally 110-130 mg/dl. Her lunch blood glucose is taken after 3 pm. Her lunch blood glucose range is generally 110-130 mg/dl. Her dinner blood glucose is taken between 6-7 pm. Her dinner blood glucose range is generally 110-130 mg/dl. Her highest blood glucose is 140-180 mg/dl. Her overall blood glucose range is 110-130 mg/dl. She does not see a podiatrist.Eye exam is current.        Subjective   Katharine Mitchell is a 62 y.o. female.      The following portions of the patient's history were reviewed and updated as appropriate: allergies, current medications, past family history, past medical history, past social history, past surgical history and problem list.    Problem List  Patient Active Problem List   Diagnosis   • Abnormal EKG   • Essential hypertension   • Preop cardiovascular exam   • Osteoarthritis of right knee   • Osteoarthritis of left knee   • Type 2 diabetes mellitus (HCC)   • Class 3 obesity due to excess calories in adult   • CAROLE (obstructive sleep apnea)   • Screening for colon cancer   • Polyp of transverse colon   • Asthma   • Diabetic peripheral neuropathy associated with type 2 diabetes mellitus (HCC)   • Inflammatory and toxic neuropathy (HCC)       Past Medical History  Past Medical History:   Diagnosis Date   • Allergic     Zestril   • Arthritis    • Asthma August 2020   • Cholelithiasis 1986    Gallbladder removed   • Diabetes mellitus (HCC)     TYPE 2   • Hypertension    • Knee pain    • Obesity    • Sleep apnea     DOES NOT WEAR CPAP   • UTI (urinary tract infection)     TREATED FOR UTI JAN 2017       Surgical History  Past Surgical History:   Procedure Laterality Date   • CHOLECYSTECTOMY      • COLONOSCOPY     • COLONOSCOPY N/A 01/06/2020    Procedure: COLONOSCOPY TO CECUM WITH COLD BIOPSY POLYPECTOMY AND 2 ML SALINE LIFT INJECTION;  Surgeon: Eva Rizvi MD;  [facility];  Service: General   • GALLBLADDER SURGERY     • JOINT REPLACEMENT     • KNEE ARTHROSCOPY Left    • PILONIDAL CYSTECTOMY     • NV ARTHRP KNE CONDYLE&PLATU MEDIAL&LAT COMPARTMENTS Right 02/20/2017    Procedure: RT TOTAL KNEE ARTHROPLASTY;  Surgeon: Lamont Morales MD;  Location: [facility] MAIN OR;  Service: Orthopedics   • TOTAL KNEE ARTHROPLASTY Left 05/14/2018    Procedure: LEFT TOTAL KNEE ARTHROPLASTY;  Surgeon: Lamont Morales MD;  Location: [facility] MAIN OR;  Service: Orthopedics       Family History  Family History   Problem Relation Age of Onset   • Arthritis Mother    • Cancer Mother    • Hypertension Mother    • Kidney disease Mother    • Hypertension Father    • Diabetes Paternal Grandmother    • Malig Hyperthermia Neg Hx        Social History  Social History    Tobacco Use      Smoking status: Never      Smokeless tobacco: Never       Is the Patient a current tobacco user? No    Allergies  Allergies   Allergen Reactions   • Lisinopril Swelling and Other (See Comments)     LIP/ MOUTH SWELLING  LIP/ MOUTH SWELLING       Current Medications    Current Outpatient Medications:   •  Advair Diskus 100-50 MCG/ACT DISKUS, INHALE 1 PUFF BY MOUTH EVERY 12 HOURS. RINSE MOUTH AFTER USE, Disp: , Rfl:   •  azelastine (ASTELIN) 0.1 % nasal spray, 1 spray into the nostril(s) as directed by provider Every Evening., Disp: , Rfl:   •  Blood Glucose Monitoring Suppl (ONE TOUCH ULTRA 2) W/DEVICE kit, , Disp: , Rfl:   •  fluticasone (FLONASE) 50 MCG/ACT nasal spray, 2 sprays into the nostril(s) as directed by provider Every Morning., Disp: , Rfl:   •  furosemide (LASIX) 20 MG tablet, TAKE 1 TABLET BY MOUTH DAILY AS NEEDED, Disp: 20 tablet, Rfl: 3  •  furosemide (LASIX) 20 MG tablet, Take 1 tablet by mouth., Disp: , Rfl:   •   metFORMIN (GLUCOPHAGE) 500 MG tablet, TAKE 2 TABLETS BY MOUTH TWICE DAILY WITH FOOD, Disp: 360 tablet, Rfl: 2  •  Multiple Vitamins-Minerals (MULTIVITAMIN ADULT PO), Take 1 tablet by mouth Every Morning. To stop before surgery, Disp: , Rfl:   •  naproxen (NAPROSYN) 500 MG tablet, Take 1 tablet by mouth Every 12 (Twelve) Hours., Disp: , Rfl:   •  rosuvastatin (CRESTOR) 10 MG tablet, TAKE 1 TABLET BY MOUTH EVERY 3 DAYS, Disp: 30 tablet, Rfl: 5  •  rosuvastatin (CRESTOR) 10 MG tablet, Take 1 tablet by mouth Every 3 (Three) Days., Disp: , Rfl:   •  spironolactone (ALDACTONE) 25 MG tablet, TAKE 2 TABLETS BY MOUTH EVERY MORNING AND 1 TABLET IN THE EVENING, Disp: 90 tablet, Rfl: 5  •  Breo Ellipta 100-25 MCG/INH inhaler, INL 1 PUFF PO DAILY. RM AFTER U (Patient not taking: Reported on 3/30/2023), Disp: , Rfl:   •  dapagliflozin (Farxiga) 5 MG tablet tablet, Take 1 tablet by mouth Daily. (Patient not taking: Reported on 3/30/2023), Disp: 90 tablet, Rfl: 2  •  fluconazole (DIFLUCAN) 150 MG tablet, Take 1 tablet by mouth 1 (One) Time. (Patient not taking: Reported on 3/30/2023), Disp: , Rfl:   •  fluconazole (Diflucan) 200 MG tablet, Use for yeast infection (Patient not taking: Reported on 3/30/2023), Disp: 1 tablet, Rfl: 0  •  glucose blood (ONE TOUCH ULTRA TEST) test strip, 1 bid, Disp: 100 each, Rfl: 5  •  linagliptin (Tradjenta) 5 MG tablet tablet, Take 1 tablet by mouth Daily. (Patient not taking: Reported on 3/30/2023), Disp: 90 tablet, Rfl: 2  •  NIFEdipine XL (PROCARDIA XL) 60 MG 24 hr tablet, Take 1 tablet by mouth 2 (Two) Times a Day., Disp: 180 tablet, Rfl: 3  •  OneTouch Delica Lancets 33G misc, Test glucose daily, Disp: 100 each, Rfl: 5  •  SITagliptin (JANUVIA) 100 MG tablet, Take 1 tablet by mouth Daily., Disp: 30 tablet, Rfl: 4  •  sulfamethoxazole-trimethoprim (BACTRIM DS,SEPTRA DS) 800-160 MG per tablet, Take 1 tablet by mouth 2 (Two) Times a Day. (Patient not taking: Reported on 3/30/2023), Disp: 14 tablet,  Rfl: 0     Review of System  Review of Systems   Constitutional: Negative for fatigue and unexpected weight loss.   Eyes: Negative for blurred vision.   Cardiovascular: Negative for chest pain.   Endocrine: Negative for polydipsia, polyphagia and polyuria.   Genitourinary: Negative for impotence.   Skin: Negative for pallor.   Neurological: Negative for dizziness, tremors, seizures, speech difficulty, weakness and confusion.   Psychiatric/Behavioral: The patient is not nervous/anxious.      I have reviewed and confirmed the accuracy of the ROS as documented by the MA/LPN/RN Poncho Lee MD    Vitals:    03/30/23 1338   BP: 128/84     Body mass index is 47.65 kg/m².    Objective     Physical Exam  Physical Exam  Constitutional:       General: She is not in acute distress.     Appearance: Normal appearance.   HENT:      Head: Normocephalic and atraumatic.   Cardiovascular:      Rate and Rhythm: Normal rate and regular rhythm.   Pulmonary:      Effort: Pulmonary effort is normal. No respiratory distress.      Breath sounds: Normal breath sounds. No wheezing, rhonchi or rales.   Neurological:      General: No focal deficit present.      Mental Status: She is alert and oriented to person, place, and time.   Psychiatric:         Mood and Affect: Mood normal.         Behavior: Behavior normal.         Thought Content: Thought content normal.         Judgment: Judgment normal.             Poncho Lee MD  03/30/2023

## 2023-04-03 ENCOUNTER — TELEPHONE (OUTPATIENT)
Dept: FAMILY MEDICINE CLINIC | Facility: CLINIC | Age: 63
End: 2023-04-03

## 2023-04-03 NOTE — TELEPHONE ENCOUNTER
Caller: Katharine Mitchell    Relationship: Self    Best call back number:[phone]     What medications are you currently taking: SITagliptin (JANUVIA) 100 MG tablet     What are your concerns: PATIENT CALLING STATING THAT THIS MEDICATION IS NEEDING A PRIOR AUTH   [facility] - [phone]

## 2023-06-01 ENCOUNTER — OFFICE VISIT (OUTPATIENT)
Dept: PODIATRY | Facility: CLINIC | Age: 63
End: 2023-06-01

## 2023-06-01 VITALS — BODY MASS INDEX: 47.84 KG/M2 | WEIGHT: 260 LBS | HEIGHT: 62 IN | RESPIRATION RATE: 20 BRPM

## 2023-06-01 DIAGNOSIS — R20.2 NUMBNESS AND TINGLING OF BOTH FEET: ICD-10-CM

## 2023-06-01 DIAGNOSIS — M77.41 METATARSALGIA OF BOTH FEET: Primary | ICD-10-CM

## 2023-06-01 DIAGNOSIS — R20.0 NUMBNESS AND TINGLING OF BOTH FEET: ICD-10-CM

## 2023-06-01 DIAGNOSIS — E11.42 DM TYPE 2 WITH DIABETIC PERIPHERAL NEUROPATHY: ICD-10-CM

## 2023-06-01 DIAGNOSIS — M54.31 SCIATICA OF RIGHT SIDE: ICD-10-CM

## 2023-06-01 DIAGNOSIS — E66.01 MORBID OBESITY WITH BMI OF 45.0-49.9, ADULT: ICD-10-CM

## 2023-06-01 DIAGNOSIS — M77.42 METATARSALGIA OF BOTH FEET: Primary | ICD-10-CM

## 2023-06-01 NOTE — PROGRESS NOTES
06/01/2023  Foot and Ankle Surgery - New Patient   Provider: Dr. Nicholas Staley DPM  Location: [facility]    Subjective:  Katharine Mitchell is a 62 y.o. female.     Chief Complaint   Patient presents with   • Right Foot - Peripheral Neuropathy   • Left Foot - Peripheral Neuropathy   • Initial Evaluation     EDGAR Lee md 3/30/2023       HPI:   The patient was referred by Dr. Lee. She reports tingling in the dorsal aspect of her bilateral feet, right greater than left, that has been present for approximately 1 year. She notes a history of lower back issues. The patient denies any previous surgeries. She sees a chiropractor for this and is also in physical therapy. She is diabetic and her last A1c was 7 percent in 02/2023. The patient denies any open wounds or infections to her feet. She notes that it only bothers her when she is sitting down. She denies pain with ambulation. She denies ever undergoing chemotherapy. The patient rates her back issues as 4 to 5 out of 10. She reports shooting pain down her buttocks.      Allergies   Allergen Reactions   • Lisinopril Swelling and Other (See Comments)     LIP/ MOUTH SWELLING  LIP/ MOUTH SWELLING       Past Medical History:   Diagnosis Date   • Allergic     Zestril   • Arthritis    • Asthma August 2020   • Cholelithiasis 1986    Gallbladder removed   • Diabetes mellitus     TYPE 2   • Gout 2021   • Hypertension    • Knee pain    • Neuropathy in diabetes 2021   • Obesity    • Sleep apnea     DOES NOT WEAR CPAP   • UTI (urinary tract infection)     TREATED FOR UTI JAN 2017       Past Surgical History:   Procedure Laterality Date   • CHOLECYSTECTOMY     • COLONOSCOPY     • COLONOSCOPY N/A 01/06/2020    Procedure: COLONOSCOPY TO CECUM WITH COLD BIOPSY POLYPECTOMY AND 2 ML SALINE LIFT INJECTION;  Surgeon: Eva Rizvi MD;  Location: [facility] ENDOSCOPY;  Service: General   • GALLBLADDER SURGERY     • JOINT REPLACEMENT     • KNEE ARTHROSCOPY Left    •  PILONIDAL CYSTECTOMY     • ME ARTHRP KNE CONDYLE&PLATU MEDIAL&LAT COMPARTMENTS Right 02/20/2017    Procedure: RT TOTAL KNEE ARTHROPLASTY;  Surgeon: Lamont Morales MD;  Location: [facility] OR;  Service: Orthopedics   • TOTAL KNEE ARTHROPLASTY Left 05/14/2018    Procedure: LEFT TOTAL KNEE ARTHROPLASTY;  Surgeon: Lamont Morales MD;  Location: [facility] OR;  Service: Orthopedics       Family History   Problem Relation Age of Onset   • Arthritis Mother    • Cancer Mother    • Hypertension Mother    • Kidney disease Mother    • Hypertension Father    • Diabetes Paternal Grandmother    • Malig Hyperthermia Neg Hx        Social History     Socioeconomic History   • Marital status: Single   Tobacco Use   • Smoking status: Never     Passive exposure: Never   • Smokeless tobacco: Never   Vaping Use   • Vaping Use: Never used   Substance and Sexual Activity   • Alcohol use: No   • Drug use: No   • Sexual activity: Not Currently     Birth control/protection: None        Current Outpatient Medications on File Prior to Visit   Medication Sig Dispense Refill   • Advair Diskus 100-50 MCG/ACT DISKUS INHALE 1 PUFF BY MOUTH EVERY 12 HOURS. RINSE MOUTH AFTER USE     • azelastine (ASTELIN) 0.1 % nasal spray 1 spray into the nostril(s) as directed by provider Every Evening.     • Blood Glucose Monitoring Suppl (ONE TOUCH ULTRA 2) W/DEVICE kit      • Breo Ellipta 100-25 MCG/INH inhaler      • dapagliflozin (Farxiga) 5 MG tablet tablet Take 1 tablet by mouth Daily. 90 tablet 2   • fluconazole (DIFLUCAN) 150 MG tablet Take 1 tablet by mouth 1 (One) Time.     • fluconazole (Diflucan) 200 MG tablet Use for yeast infection 3 tablet 0   • fluticasone (FLONASE) 50 MCG/ACT nasal spray 2 sprays into the nostril(s) as directed by provider Every Morning.     • furosemide (LASIX) 20 MG tablet TAKE 1 TABLET BY MOUTH DAILY AS NEEDED 20 tablet 3   • furosemide (LASIX) 20 MG tablet Take 1 tablet by mouth.     • glucose blood (ONE TOUCH ULTRA TEST) test  strip 1 bid 100 each 5   • linagliptin (Tradjenta) 5 MG tablet tablet Take 1 tablet by mouth Daily. 90 tablet 2   • metFORMIN (GLUCOPHAGE) 500 MG tablet TAKE 2 TABLETS BY MOUTH TWICE DAILY WITH FOOD 360 tablet 2   • Multiple Vitamins-Minerals (MULTIVITAMIN ADULT PO) Take 1 tablet by mouth Every Morning. To stop before surgery     • naproxen (NAPROSYN) 500 MG tablet Take 1 tablet by mouth Every 12 (Twelve) Hours.     • NIFEdipine XL (PROCARDIA XL) 60 MG 24 hr tablet Take 1 tablet by mouth 2 (Two) Times a Day. 180 tablet 3   • OneTouch Delica Lancets 33G misc Test glucose daily 100 each 5   • rosuvastatin (CRESTOR) 10 MG tablet TAKE 1 TABLET BY MOUTH EVERY 3 DAYS 30 tablet 5   • rosuvastatin (CRESTOR) 10 MG tablet Take 1 tablet by mouth Every 3 (Three) Days.     • SITagliptin (JANUVIA) 100 MG tablet Take 1 tablet by mouth Daily. 30 tablet 4   • spironolactone (ALDACTONE) 25 MG tablet TAKE 2 TABLETS BY MOUTH EVERY MORNING AND 1 TABLET IN THE EVENING 90 tablet 5   • sulfamethoxazole-trimethoprim (BACTRIM DS,SEPTRA DS) 800-160 MG per tablet Take 1 tablet by mouth 2 (Two) Times a Day. 14 tablet 0     No current facility-administered medications on file prior to visit.       Review of Systems:  General: Denies fever, chills, fatigue, and weakness.  Eyes: Denies vision loss, blurry vision, and excessive redness.  ENT: Denies hearing issues and difficulty swallowing.  Cardiovascular: Denies palpitations, chest pain, or syncopal episodes.  Respiratory: Denies shortness of breath, wheezing, and coughing.  GI: Denies abdominal pain, nausea, and vomiting.   : Denies frequency, hematuria, and urgency.  Musculoskeletal: Denies muscle cramps, joint pains, and stiffness.  Derm: Denies rash, open wounds, or suspicious lesions.  Neuro: Denies headaches, numbness, loss of coordination, and tremors.  Psych: Denies anxiety and depression.  Endocrine: Denies temperature intolerance and changes in appetite.  Heme: Denies bleeding  "disorders or abnormal bruising.     Objective   Resp 20   Ht 157.5 cm (62\")   Wt 118 kg (260 lb)   LMP 07/01/2016   BMI 47.55 kg/m²     Foot/Ankle Exam    GENERAL  Appearance:  obese  Orientation:  AAOx3  Affect:  appropriate    VASCULAR     Right Foot Vascularity   Normal vascular exam    Dorsalis pedis:  2+  Posterior tibial:  2+  Skin temperature:  warm  Edema grading:  None  CFT:  < 3 seconds  Pedal hair growth:  Present  Varicosities:  none     Left Foot Vascularity   Normal vascular exam    Dorsalis pedis:  2+  Posterior tibial:  2+  Skin temperature:  warm  Edema grading:  None  CFT:  < 3 seconds  Pedal hair growth:  Present  Varicosities:  none     NEUROLOGIC     Right Foot Neurologic   Light touch sensation: normal  Hot/Cold sensation: normal  Achilles reflex:  2+     Left Foot Neurologic   Light touch sensation: normal  Hot/Cold sensation:  normal  Achilles reflex:  2+    MUSCULOSKELETAL     Right Foot Musculoskeletal   Arch:  Normal     Left Foot Musculoskeletal   Arch:  Normal    MUSCLE STRENGTH     Right Foot Muscle Strength   Normal strength    Foot dorsiflexion:  5  Foot plantar flexion:  5  Foot inversion:  5  Foot eversion:  5     Left Foot Muscle Strength   Normal strength    Foot dorsiflexion:  5  Foot plantar flexion:  5  Foot inversion:  5  Foot eversion:  5    DERMATOLOGIC      Right Foot Dermatologic   Skin  Right foot skin is intact.      Left Foot Dermatologic   Skin  Left foot skin is intact.     TESTS     Right Foot Tests   Anterior drawer: negative  Varus tilt: negative     Left Foot Tests   Anterior drawer: negative  Varus tilt: negative     Right foot additional comments: 06/01/2023: No significant pain with palpation to the feet or ankles. Moderate equinus contracture with knee extended and flexed. Neurovascular status appears to be grossly intact. No additional issues or concerns.     Left foot additional comments: 06/01/2023: No significant pain with palpation to the feet or " ankles. Moderate equinus contracture with knee extended and flexed. Neurovascular status appears to be grossly intact. No additional issues or concerns.      Assessment & Plan   Diagnoses and all orders for this visit:    1. Metatarsalgia of both feet (Primary)    2. Numbness and tingling of both feet    3. DM type 2 with diabetic peripheral neuropathy    4. Morbid obesity with BMI of 45.0-49.9, adult    5. Sciatica of right side        The patient is a 62-year-old female who presents to the office today for evaluation of issues involving her bilateral feet. We discussed the etiology and biomechanics involved with her bilateral feet issues. I explained that peripheral neuropathy is a very broad term and the nerves are functioning differently than they should. I explained that her symptoms are most likely related to uncontrolled longstanding diabetes, back issues, and chemotherapy. We discussed conservative care with chiropractic care, physical therapy, weight loss, and activity modification. We discussed avoidance of ambulating while barefoot, as well as wearing flip flops, sandals, or flats. We discussed avoidance of ambulating while barefoot. I recommend over-the-counter arch supports. We discussed utilizing a topical compound pain cream to desensitize her nerve discomfort. We discussed weight management, weight loss, and low-impact exercises. I explained that she is at a mild to moderate risk from a diabetic standpoint. We discussed avoidance of open wounds, callusing, or issues that could lead to infections and amputations. The patient will return to the office in 6 weeks for reevaluation.     No orders of the defined types were placed in this encounter.       Note is dictated utilizing voice recognition software. Unfortunately this leads to occasional typographical errors. I apologize in advance if the situation occurs. If questions occur please do not hesitate to call our office.    Transcribed from ambient  dictation for JOSEPH Staley DPM by Mk Self.  06/01/23   14:16 EDT    Patient or patient representative verbalized consent to the visit recording.  I have personally performed the services described in this document as transcribed by the above individual, and it is both accurate and complete.

## 2023-06-05 RX ORDER — SPIRONOLACTONE 25 MG/1
TABLET ORAL
Qty: 90 TABLET | Refills: 5 | Status: SHIPPED | OUTPATIENT
Start: 2023-06-05

## 2023-07-31 DIAGNOSIS — E11.65 TYPE 2 DIABETES MELLITUS WITH HYPERGLYCEMIA, WITHOUT LONG-TERM CURRENT USE OF INSULIN: ICD-10-CM

## 2023-09-28 ENCOUNTER — OFFICE VISIT (OUTPATIENT)
Dept: FAMILY MEDICINE CLINIC | Facility: CLINIC | Age: 63
End: 2023-09-28
Payer: COMMERCIAL

## 2023-09-28 VITALS — WEIGHT: 281 LBS | HEIGHT: 62 IN | BODY MASS INDEX: 51.71 KG/M2

## 2023-09-28 DIAGNOSIS — E11.65 TYPE 2 DIABETES MELLITUS WITH HYPERGLYCEMIA, WITHOUT LONG-TERM CURRENT USE OF INSULIN: Primary | ICD-10-CM

## 2023-09-28 DIAGNOSIS — I10 ESSENTIAL HYPERTENSION: ICD-10-CM

## 2023-09-28 DIAGNOSIS — Z23 NEED FOR INFLUENZA VACCINATION: ICD-10-CM

## 2023-09-28 PROBLEM — N95.0 PMB (POSTMENOPAUSAL BLEEDING): Status: ACTIVE | Noted: 2023-08-02

## 2023-09-28 PROBLEM — N85.02 ENDOMETRIAL HYPERPLASIA WITH ATYPIA: Status: ACTIVE | Noted: 2023-09-13

## 2023-09-28 LAB
EXPIRATION DATE: NORMAL
HBA1C MFR BLD: 7.3 %
Lab: NORMAL

## 2023-09-28 NOTE — PROGRESS NOTES
09/28/2023    Assessment & Plan     This 63-year-old patient with a prior history of diabetes mellitus type 2 and dysfunctional uterine bleeding presents today for follow-up.  She relates that in the interim of visit she has been seen by GYN and had some uterine polyps removed after subsequent biopsy was decided that she should undergo hysterectomy and this is scheduled in the next few weeks.  Patient also relates that she has had some chronic pain that is necessitated to steroid injections.  Note is made that her glucose levels have increased somewhat from usual 130 to around 150 she relates.    Patient hemoglobin A1c back on 6/30 was 6.9.  Hemoglobin A1c today was 7.3.  She currently is on metformin 500 mg 1 tab p.o. twice daily and Januvia 100 mg p.o. daily.  Note is made that she is not on Farxiga or Tradjenta.  Both these medications caused some GI discomfort and she was taken off those.    Patient's weight is holding at 127 kg equals 281 pounds.      Diagnoses and all orders for this visit:    1. Type 2 diabetes mellitus with hyperglycemia, without long-term current use of insulin (Primary)  -     POCT glycated hemoglobin, total; Standing  -     POCT glycated hemoglobin, total    2. Need for influenza vaccination  -     Fluzone (or Fluarix & Flulaval for VFC) >6mos    3. Essential hypertension      Plan:  1.)  Follow-up in 3 months  2.)  Continue metformin 500 mg 2 tablets in the morning 2 tablets in the afternoon and Januvia 100 mg.       CC: Diabetes (F/U.  Patient scheduled for a hysterectomy on 10/11/23.---no other issues---unable to hear bp)  .        HPI  Diabetes       Subjective   Katharine Mitchell is a 63 y.o. female.      The following portions of the patient's history were reviewed and updated as appropriate: allergies, current medications, past family history, past medical history, past social history, past surgical history, and problem list.    Problem List  Patient Active Problem List    Diagnosis    Abnormal EKG    Essential hypertension    Preop cardiovascular exam    Osteoarthritis of right knee    Osteoarthritis of left knee    Type 2 diabetes mellitus    Class 3 obesity due to excess calories in adult    CAROLE (obstructive sleep apnea)    Screening for colon cancer    Polyp of transverse colon    Asthma    Diabetic peripheral neuropathy associated with type 2 diabetes mellitus    Inflammatory and toxic neuropathy    Endometrial hyperplasia with atypia    PMB (postmenopausal bleeding)       Past Medical History  Past Medical History:   Diagnosis Date    Allergic     Zestril    Arthritis     Asthma August 2020    Cholelithiasis 1986    Gallbladder removed    Diabetes mellitus     TYPE 2    Gout 2021    Hypertension     Knee pain     Neuropathy in diabetes 2021    Obesity     Pinched nerve in neck 06/30/2023    Sleep apnea     DOES NOT WEAR CPAP    UTI (urinary tract infection)     TREATED FOR UTI JAN 2017       Surgical History  Past Surgical History:   Procedure Laterality Date    CHOLECYSTECTOMY      COLONOSCOPY      COLONOSCOPY N/A 01/06/2020    Procedure: COLONOSCOPY TO CECUM WITH COLD BIOPSY POLYPECTOMY AND 2 ML SALINE LIFT INJECTION;  Surgeon: Eva Rizvi MD;  Location: [facility] ENDOSCOPY;  Service: General    GALLBLADDER SURGERY      JOINT REPLACEMENT      KNEE ARTHROSCOPY Left     PILONIDAL CYSTECTOMY      RI ARTHRP KNE CONDYLE&PLATU MEDIAL&LAT COMPARTMENTS Right 02/20/2017    Procedure: RT TOTAL KNEE ARTHROPLASTY;  Surgeon: Lamont Morales MD;  Location: [facility] OR;  Service: Orthopedics    TOTAL KNEE ARTHROPLASTY Left 05/14/2018    Procedure: LEFT TOTAL KNEE ARTHROPLASTY;  Surgeon: Lamont Morales MD;  Location: [facility] OR;  Service: Orthopedics       Family History  Family History   Problem Relation Age of Onset    Arthritis Mother     Cancer Mother     Hypertension Mother     Kidney disease Mother     Hypertension Father     Diabetes Paternal Grandmother     Guillermo  Hyperthermia Neg Hx        Social History  Social History    Tobacco Use      Smoking status: Never      Smokeless tobacco: Never       Is the Patient a current tobacco user? No    Allergies  Allergies   Allergen Reactions    Lisinopril Swelling and Other (See Comments)     LIP/ MOUTH SWELLING  LIP/ MOUTH SWELLING       Current Medications    Current Outpatient Medications:     Advair Diskus 100-50 MCG/ACT DISKUS, INHALE 1 PUFF BY MOUTH EVERY 12 HOURS. RINSE MOUTH AFTER USE, Disp: , Rfl:     azelastine (ASTELIN) 0.1 % nasal spray, 1 spray into the nostril(s) as directed by provider Every Evening., Disp: , Rfl:     Blood Glucose Monitoring Suppl (ONE TOUCH ULTRA 2) W/DEVICE kit, , Disp: , Rfl:     fluticasone (FLONASE) 50 MCG/ACT nasal spray, 2 sprays into the nostril(s) as directed by provider Every Morning., Disp: , Rfl:     furosemide (LASIX) 20 MG tablet, TAKE 1 TABLET BY MOUTH DAILY AS NEEDED, Disp: 20 tablet, Rfl: 3    glucose blood (ONE TOUCH ULTRA TEST) test strip, 1 bid, Disp: 100 each, Rfl: 5    metFORMIN (GLUCOPHAGE) 500 MG tablet, TAKE 2 TABLETS BY MOUTH TWICE DAILY WITH FOOD, Disp: 360 tablet, Rfl: 2    Multiple Vitamins-Minerals (MULTIVITAMIN ADULT PO), Take 1 tablet by mouth Every Morning. To stop before surgery, Disp: , Rfl:     naproxen (NAPROSYN) 500 MG tablet, Take 1 tablet by mouth Every 12 (Twelve) Hours., Disp: , Rfl:     NIFEdipine XL (PROCARDIA XL) 60 MG 24 hr tablet, Take 1 tablet by mouth 2 (Two) Times a Day., Disp: 180 tablet, Rfl: 3    OneTouch Delica Lancets 33G misc, Test glucose daily, Disp: 100 each, Rfl: 5    pregabalin (LYRICA) 75 MG capsule, Take 1 capsule by mouth Daily., Disp: , Rfl:     rosuvastatin (CRESTOR) 10 MG tablet, TAKE 1 TABLET BY MOUTH EVERY 3 DAYS, Disp: 30 tablet, Rfl: 5    SITagliptin (JANUVIA) 100 MG tablet, Take 1 tablet by mouth Daily., Disp: 30 tablet, Rfl: 4    spironolactone (ALDACTONE) 25 MG tablet, TAKE 2 TABLETS BY MOUTH EVERY MORNING AND 1 TABLET BY MOUTH  EVERY EVENING, Disp: 90 tablet, Rfl: 5    TURMERIC PO, Take  by mouth., Disp: , Rfl:     amantadine (SYMMETREL) 100 MG tablet, , Disp: , Rfl:     Breo Ellipta 100-25 MCG/INH inhaler, , Disp: , Rfl:     dapagliflozin (Farxiga) 5 MG tablet tablet, Take 1 tablet by mouth Daily. (Patient not taking: Reported on 6/30/2023), Disp: 90 tablet, Rfl: 2    diazePAM (VALIUM) 5 MG tablet, TAKE 1 TO 2 TABLETS BY MOUTH 1 TIME 1 HOUR BEFORE MRI FOR 1 DOSE (Patient not taking: Reported on 6/30/2023), Disp: , Rfl:     fluconazole (DIFLUCAN) 150 MG tablet, Take 1 tablet by mouth 1 (One) Time. (Patient not taking: Reported on 6/30/2023), Disp: , Rfl:     fluconazole (Diflucan) 200 MG tablet, Use for yeast infection (Patient not taking: Reported on 6/30/2023), Disp: 3 tablet, Rfl: 0    furosemide (LASIX) 20 MG tablet, Take 1 tablet by mouth., Disp: , Rfl:     linagliptin (Tradjenta) 5 MG tablet tablet, Take 1 tablet by mouth Daily. (Patient not taking: Reported on 6/30/2023), Disp: 90 tablet, Rfl: 2    miSOPROStol (CYTOTEC) 200 MCG tablet, , Disp: , Rfl:     rosuvastatin (CRESTOR) 10 MG tablet, Take 1 tablet by mouth Every 3 (Three) Days. (Patient not taking: Reported on 6/30/2023), Disp: , Rfl:     sulfamethoxazole-trimethoprim (BACTRIM DS,SEPTRA DS) 800-160 MG per tablet, Take 1 tablet by mouth 2 (Two) Times a Day. (Patient not taking: Reported on 6/30/2023), Disp: 14 tablet, Rfl: 0    Unable to find, 1 each 1 (One) Time. Sea dan, Disp: , Rfl:      Review of System  Review of Systems  I have reviewed and confirmed the accuracy of the ROS as documented by the MA/LPN/RN Poncho Lee MD    There were no vitals filed for this visit.  Body mass index is 51.4 kg/m².    Objective     Physical Exam  Physical Exam  Constitutional:       General: She is not in acute distress.     Appearance: Normal appearance.   HENT:      Head: Normocephalic and atraumatic.   Cardiovascular:      Rate and Rhythm: Normal rate and regular rhythm.    Pulmonary:      Effort: Pulmonary effort is normal. No respiratory distress.      Breath sounds: Normal breath sounds. No wheezing, rhonchi or rales.   Neurological:      General: No focal deficit present.      Mental Status: She is alert and oriented to person, place, and time.   Psychiatric:         Mood and Affect: Mood normal.         Behavior: Behavior normal.         Thought Content: Thought content normal.         Judgment: Judgment normal.           Poncho Lee MD  09/28/2023Answers submitted by the patient for this visit:  Primary Reason for Visit (Submitted on 9/26/2023)  What is the primary reason for your visit?: Diabetes

## 2023-11-27 RX ORDER — SPIRONOLACTONE 25 MG/1
TABLET ORAL
Qty: 90 TABLET | Refills: 5 | Status: SHIPPED | OUTPATIENT
Start: 2023-11-27

## 2023-12-28 ENCOUNTER — OFFICE VISIT (OUTPATIENT)
Dept: FAMILY MEDICINE CLINIC | Facility: CLINIC | Age: 63
End: 2023-12-28
Payer: COMMERCIAL

## 2023-12-28 VITALS
DIASTOLIC BLOOD PRESSURE: 70 MMHG | HEIGHT: 62 IN | SYSTOLIC BLOOD PRESSURE: 130 MMHG | BODY MASS INDEX: 51.97 KG/M2 | WEIGHT: 282.4 LBS

## 2023-12-28 DIAGNOSIS — E11.65 TYPE 2 DIABETES MELLITUS WITH HYPERGLYCEMIA, WITHOUT LONG-TERM CURRENT USE OF INSULIN: Primary | ICD-10-CM

## 2023-12-28 DIAGNOSIS — I10 ESSENTIAL HYPERTENSION: ICD-10-CM

## 2023-12-28 DIAGNOSIS — R21 RASH: ICD-10-CM

## 2023-12-28 PROCEDURE — 99214 OFFICE O/P EST MOD 30 MIN: CPT | Performed by: INTERNAL MEDICINE

## 2023-12-28 RX ORDER — NAPROXEN SODIUM 220 MG
220 TABLET ORAL
COMMUNITY
Start: 2023-10-05 | End: 2024-10-04

## 2023-12-28 RX ORDER — TRIAMCINOLONE ACETONIDE 55 UG/1
2 SPRAY, METERED NASAL DAILY
COMMUNITY
Start: 2023-11-26 | End: 2023-12-28

## 2023-12-28 NOTE — PROGRESS NOTES
12/28/2023    Assessment & Plan   This 63-year-old patient presents at this time for follow-up of diabetes mellitus type 2 and hypertension.  She relates she is taking her medication as prescribed and has had no episodes of hypoglycemic she had a hysterectomy done approximately a month or so ago and is still recovering from that there was also some problems with scheduling such that she is scheduled 1 month before her follow-up for her usual diabetes appointment.  Also she did not bring her glucometer with her today so we could check her glucose levels.    Her blood pressure was 130/70 in the left arm large cuff sitting position.  Note is made that her hemoglobin A1c was 6.8 back on 10/5.    Patient also has a rash under the right underarm is been present off and on over the past 3 weeks.  She relates it is made better with triamcinolone cream.  Evaluation shows no evidence of a rash at this time but historically this is probably is secondary to allergy to aluminum chloral hydrate.  We have asked the patient to avoid this and her antiperspirant.    There are no diagnoses linked to this encounter.  Class 3 Severe Obesity (BMI >=40). Obesity-related health conditions include the following: hypertension and diabetes mellitus. Obesity is unchanged. BMI is is above average; no BMI management plan is appropriate. We discussed portion control and increasing exercise.           CC: Diabetes (Last A1c=6.8 on 10/5/23.--unable to hear bp---no other issues)  .  Plan:  1.)  Follow-up in 4 to 6 weeks for reevaluation of diabetes  2.)  Continue nifedipine 60 mg 1 tab p.o. daily and spironolactone for hypertension.  3.)  DC aluminum chloral hydrate and the antiperspirant.    HPI  Diabetes  Pertinent negatives for hypoglycemia include no dizziness. Pertinent negatives for diabetes include no chest pain.        Subjective   Katharine Mitchell is a 63 y.o. female.      The following portions of the patient's history were reviewed and  updated as appropriate: allergies, current medications, past family history, past medical history, past social history, past surgical history, and problem list.    Problem List  Patient Active Problem List   Diagnosis    Abnormal EKG    Essential hypertension    Preop cardiovascular exam    Osteoarthritis of right knee    Osteoarthritis of left knee    Type 2 diabetes mellitus    Class 3 obesity due to excess calories in adult    CAROLE (obstructive sleep apnea)    Screening for colon cancer    Polyp of transverse colon    Asthma    Diabetic peripheral neuropathy associated with type 2 diabetes mellitus    Inflammatory and toxic neuropathy    Endometrial hyperplasia with atypia    PMB (postmenopausal bleeding)       Past Medical History  Past Medical History:   Diagnosis Date    Abnormal EKG 01/09/2017    Allergic     Zestril    Arthritis     Asthma August 2020    Cholelithiasis 1986    Gallbladder removed    Diabetes mellitus     TYPE 2    Gout 2021    Hypertension     Knee pain     Neuropathy in diabetes 2021    Obesity     Pinched nerve in neck 06/30/2023    Sleep apnea     DOES NOT WEAR CPAP    UTI (urinary tract infection)     TREATED FOR UTI JAN 2017       Surgical History  Past Surgical History:   Procedure Laterality Date    CHOLECYSTECTOMY      COLONOSCOPY      COLONOSCOPY N/A 01/06/2020    Procedure: COLONOSCOPY TO CECUM WITH COLD BIOPSY POLYPECTOMY AND 2 ML SALINE LIFT INJECTION;  Surgeon: Eva Rizvi MD;  Location: [facility] ENDOSCOPY;  Service: General    GALLBLADDER SURGERY      JOINT REPLACEMENT      KNEE ARTHROSCOPY Left     PILONIDAL CYSTECTOMY      AR ARTHRP KNE CONDYLE&PLATU MEDIAL&LAT COMPARTMENTS Right 02/20/2017    Procedure: RT TOTAL KNEE ARTHROPLASTY;  Surgeon: Lamont Morales MD;  Location: [facility] OR;  Service: Orthopedics    TOTAL KNEE ARTHROPLASTY Left 05/14/2018    Procedure: LEFT TOTAL KNEE ARTHROPLASTY;  Surgeon: Lamont Morales MD;  Location: [facility] OR;  Service: Orthopedics        Family History  Family History   Problem Relation Age of Onset    Arthritis Mother     Cancer Mother     Hypertension Mother     Kidney disease Mother     Hypertension Father     Diabetes Paternal Grandmother     Malig Hyperthermia Neg Hx        Social History  Social History    Tobacco Use      Smoking status: Never      Smokeless tobacco: Never       Is the Patient a current tobacco user? No    Allergies  Allergies   Allergen Reactions    Lisinopril Other (See Comments), Swelling and Provider Review Needed     LIP/ MOUTH SWELLING       Current Medications    Current Outpatient Medications:     Advair Diskus 100-50 MCG/ACT DISKUS, INHALE 1 PUFF BY MOUTH EVERY 12 HOURS. RINSE MOUTH AFTER USE, Disp: , Rfl:     azelastine (ASTELIN) 0.1 % nasal spray, 1 spray into the nostril(s) as directed by provider Every Evening., Disp: , Rfl:     Blood Glucose Monitoring Suppl (ONE TOUCH ULTRA 2) W/DEVICE kit, , Disp: , Rfl:     furosemide (LASIX) 20 MG tablet, TAKE 1 TABLET BY MOUTH DAILY AS NEEDED, Disp: 20 tablet, Rfl: 3    glucose blood (ONE TOUCH ULTRA TEST) test strip, 1 bid, Disp: 100 each, Rfl: 5    metFORMIN (GLUCOPHAGE) 500 MG tablet, TAKE 2 TABLETS BY MOUTH TWICE DAILY WITH FOOD, Disp: 360 tablet, Rfl: 2    Multiple Vitamins-Minerals (MULTIVITAMIN ADULT PO), Take 1 tablet by mouth Every Morning. To stop before surgery, Disp: , Rfl:     naproxen sodium (ALEVE) 220 MG tablet, Take 1 tablet by mouth., Disp: , Rfl:     NIFEdipine XL (PROCARDIA XL) 60 MG 24 hr tablet, Take 1 tablet by mouth 2 (Two) Times a Day., Disp: 180 tablet, Rfl: 3    OneTouch Delica Lancets 33G misc, Test glucose daily, Disp: 100 each, Rfl: 5    pregabalin (LYRICA) 75 MG capsule, Take 1 capsule by mouth Daily., Disp: , Rfl:     rosuvastatin (CRESTOR) 10 MG tablet, TAKE 1 TABLET BY MOUTH EVERY 3 DAYS, Disp: 30 tablet, Rfl: 5    SITagliptin (JANUVIA) 100 MG tablet, Take 1 tablet by mouth Daily., Disp: 30 tablet, Rfl: 4    spironolactone (ALDACTONE)  25 MG tablet, TAKE 2 TABLETS BY MOUTH EVERY MORNING AND 1 TABLET EVERY EVENING, Disp: 90 tablet, Rfl: 5    TURMERIC PO, Take  by mouth., Disp: , Rfl:     Unable to find, 1 each 1 (One) Time. Sea dan, Disp: , Rfl:     fluconazole (Diflucan) 200 MG tablet, Use for yeast infection, Disp: 3 tablet, Rfl: 0    furosemide (LASIX) 20 MG tablet, Take 1 tablet by mouth., Disp: , Rfl:     rosuvastatin (CRESTOR) 10 MG tablet, Take 1 tablet by mouth Every 3 (Three) Days., Disp: , Rfl:      Review of System  Review of Systems   Constitutional:  Negative for chills and fever.   Respiratory:  Negative for cough and shortness of breath.    Cardiovascular:  Negative for chest pain and palpitations.   Gastrointestinal:  Negative for constipation, diarrhea, nausea and vomiting.   Neurological:  Negative for dizziness and headache.     I have reviewed and confirmed the accuracy of the ROS as documented by the MA/LPN/RN Poncho Lee MD    There were no vitals filed for this visit.  Body mass index is 51.65 kg/m².    Objective     Physical Exam  Physical Exam  Constitutional:       General: She is not in acute distress.     Appearance: Normal appearance.   HENT:      Head: Normocephalic and atraumatic.   Cardiovascular:      Rate and Rhythm: Normal rate and regular rhythm.   Pulmonary:      Effort: Pulmonary effort is normal. No respiratory distress.      Breath sounds: Normal breath sounds. No wheezing, rhonchi or rales.   Neurological:      General: No focal deficit present.      Mental Status: She is alert and oriented to person, place, and time.   Psychiatric:         Mood and Affect: Mood normal.         Behavior: Behavior normal.         Thought Content: Thought content normal.         Judgment: Judgment normal.             Poncho Lee MD  12/28/2023  Answers submitted by the patient for this visit:  Other (Submitted on 12/26/2023)  Please describe your symptoms.: Annual checkup  Have you had these symptoms before?:  No  How long have you been having these symptoms?: 1-4 days  Please list any medications you are currently taking for this condition.: None  Primary Reason for Visit (Submitted on 12/26/2023)  What is the primary reason for your visit?: Other

## 2024-02-08 DIAGNOSIS — I10 ESSENTIAL HYPERTENSION: ICD-10-CM

## 2024-02-08 RX ORDER — NIFEDIPINE 60 MG/1
60 TABLET, EXTENDED RELEASE ORAL 2 TIMES DAILY
Qty: 180 TABLET | Refills: 0 | Status: SHIPPED | OUTPATIENT
Start: 2024-02-08

## 2024-02-08 RX ORDER — SITAGLIPTIN 100 MG/1
100 TABLET, FILM COATED ORAL DAILY
Qty: 90 TABLET | Refills: 0 | Status: SHIPPED | OUTPATIENT
Start: 2024-02-08

## 2024-02-21 ENCOUNTER — OFFICE VISIT (OUTPATIENT)
Dept: PODIATRY | Facility: CLINIC | Age: 64
End: 2024-02-21
Payer: COMMERCIAL

## 2024-02-21 VITALS
WEIGHT: 275.4 LBS | OXYGEN SATURATION: 99 % | RESPIRATION RATE: 16 BRPM | HEART RATE: 89 BPM | HEIGHT: 62 IN | BODY MASS INDEX: 50.68 KG/M2

## 2024-02-21 DIAGNOSIS — M77.41 METATARSALGIA OF BOTH FEET: Primary | ICD-10-CM

## 2024-02-21 DIAGNOSIS — R20.0 NUMBNESS AND TINGLING OF BOTH FEET: ICD-10-CM

## 2024-02-21 DIAGNOSIS — E66.01 MORBID OBESITY WITH BMI OF 50.0-59.9, ADULT: ICD-10-CM

## 2024-02-21 DIAGNOSIS — E11.42 DM TYPE 2 WITH DIABETIC PERIPHERAL NEUROPATHY: ICD-10-CM

## 2024-02-21 DIAGNOSIS — R20.2 NUMBNESS AND TINGLING OF BOTH FEET: ICD-10-CM

## 2024-02-21 DIAGNOSIS — M77.42 METATARSALGIA OF BOTH FEET: Primary | ICD-10-CM

## 2024-02-21 RX ORDER — MELOXICAM 15 MG/1
15 TABLET ORAL DAILY
COMMUNITY
Start: 2024-01-23

## 2024-02-21 NOTE — PROGRESS NOTES
02/21/2024  Foot and Ankle Surgery - Established Patient/Follow-up  Provider: Dr. Nicholas Staley DPM  Location: [facility]    Subjective:  Katharine Mitchell is a 63 y.o. female.     Chief Complaint   Patient presents with    Right Foot - Follow-up, Pain, Numbness, Diabetes    Left Foot - Follow-up, Pain, Numbness, Diabetes    Follow-up     EDGAR Lee md 12/28/2023       HPI: The patient is a 63-year-old female who returns to the clinic for a 6-month follow-up regarding her bilateral feet.    She reports mild improvement in her bilateral feet; however, she continues to experience intermittent numbness. The patient states she has been wearing the inserts daily and performing stretching exercises when she thinks about it. She notes she is currently attending physical therapy for her lower back. She states she is hoping to return to the gym within the next few weeks. The patient reports she has been moisturizing her feet. She notes she was prescribed a topical compound pain cream; however, she ran out of it. She states she applies BioFreeze when she experiences a burning sensation.     The patient notes her last A1c was approximately 6.9 %.     Allergies   Allergen Reactions    Lisinopril Other (See Comments), Swelling and Provider Review Needed     LIP/ MOUTH SWELLING       Current Outpatient Medications on File Prior to Visit   Medication Sig Dispense Refill    Advair Diskus 100-50 MCG/ACT DISKUS INHALE 1 PUFF BY MOUTH EVERY 12 HOURS. RINSE MOUTH AFTER USE      azelastine (ASTELIN) 0.1 % nasal spray 1 spray into the nostril(s) as directed by provider Every Evening.      Blood Glucose Monitoring Suppl (ONE TOUCH ULTRA 2) W/DEVICE kit       furosemide (LASIX) 20 MG tablet TAKE 1 TABLET BY MOUTH DAILY AS NEEDED 20 tablet 3    glucose blood (ONE TOUCH ULTRA TEST) test strip 1 bid 100 each 5    meloxicam (MOBIC) 15 MG tablet Take 1 tablet by mouth Daily.      metFORMIN (GLUCOPHAGE) 500 MG tablet TAKE 2 TABLETS  "BY MOUTH TWICE DAILY WITH FOOD 360 tablet 2    Multiple Vitamins-Minerals (MULTIVITAMIN ADULT PO) Take 1 tablet by mouth Every Morning. To stop before surgery      naproxen sodium (ALEVE) 220 MG tablet Take 1 tablet by mouth.      NIFEdipine XL (PROCARDIA XL) 60 MG 24 hr tablet TAKE 1 TABLET BY MOUTH TWICE DAILY 180 tablet 0    OneTouch Delica Lancets 33G misc Test glucose daily 100 each 5    rosuvastatin (CRESTOR) 10 MG tablet Take 1 tablet by mouth Every 3 (Three) Days.      SITagliptin (Januvia) 100 MG tablet TAKE 1 TABLET BY MOUTH DAILY 90 tablet 0    spironolactone (ALDACTONE) 25 MG tablet TAKE 2 TABLETS BY MOUTH EVERY MORNING AND 1 TABLET EVERY EVENING 90 tablet 5    TURMERIC PO Take  by mouth.      Unable to find 1 each 1 (One) Time. Sea dan      furosemide (LASIX) 20 MG tablet Take 1 tablet by mouth. (Patient not taking: Reported on 2/21/2024)       No current facility-administered medications on file prior to visit.       Objective   Pulse 89   Resp 16   Ht 157.5 cm (62\")   Wt 125 kg (275 lb 6.4 oz)   LMP 07/01/2016   SpO2 99%   BMI 50.37 kg/m²     Foot/Ankle Exam    GENERAL  Appearance:  obese  Orientation:  AAOx3  Affect:  appropriate    VASCULAR     Right Foot Vascularity   Normal vascular exam    Dorsalis pedis:  2+  Posterior tibial:  2+  Skin temperature:  warm  Edema grading:  None  CFT:  < 3 seconds  Pedal hair growth:  Present  Varicosities:  none     Left Foot Vascularity   Normal vascular exam    Dorsalis pedis:  2+  Posterior tibial:  2+  Skin temperature:  warm  Edema grading:  None  CFT:  < 3 seconds  Pedal hair growth:  Present  Varicosities:  none     NEUROLOGIC     Right Foot Neurologic   Light touch sensation: normal  Hot/Cold sensation: normal  Achilles reflex:  2+     Left Foot Neurologic   Light touch sensation: normal  Hot/Cold sensation:  normal  Achilles reflex:  2+    MUSCULOSKELETAL     Right Foot Musculoskeletal   Arch:  Normal     Left Foot Musculoskeletal   Arch:  " Normal    MUSCLE STRENGTH     Right Foot Muscle Strength   Normal strength    Foot dorsiflexion:  5  Foot plantar flexion:  5  Foot inversion:  5  Foot eversion:  5     Left Foot Muscle Strength   Normal strength    Foot dorsiflexion:  5  Foot plantar flexion:  5  Foot inversion:  5  Foot eversion:  5    DERMATOLOGIC      Right Foot Dermatologic   Skin  Right foot skin is intact.      Left Foot Dermatologic   Skin  Left foot skin is intact.     TESTS     Right Foot Tests   Anterior drawer: negative  Varus tilt: negative     Left Foot Tests   Anterior drawer: negative  Varus tilt: negative     Right foot additional comments: 06/01/2023: No significant pain with palpation to the feet or ankles. Moderate equinus contracture with knee extended and flexed. Neurovascular status appears to be grossly intact. No additional issues or concerns.    07/20/2023  No significant pain or discomfort involving the feet. No open wounds or signs of infection.    02/21/2024  No open wounds or signs of infection. No complicating features.     Left foot additional comments: 06/01/2023: No significant pain with palpation to the feet or ankles. Moderate equinus contracture with knee extended and flexed. Neurovascular status appears to be grossly intact. No additional issues or concerns.    07/20/2023  No significant pain or discomfort involving the feet. No open wounds or signs of infection.    02/21/2024  No open wounds or signs of infection. No complicating features.      Assessment & Plan   Diagnoses and all orders for this visit:    1. Metatarsalgia of both feet (Primary)    2. Numbness and tingling of both feet    3. DM type 2 with diabetic peripheral neuropathy    4. Morbid obesity with BMI of 50.0-59.9, adult        The patient returns to the office today for a follow-up regarding her bilateral feet. No results were obtained or interpreted today. She has made improvements but continues to experience intermittent numbness. Advised I  am still not overly concerned regarding her diabetic foot risk as her symptoms are likely being exacerbated by gravity, weight, and activity which is complicated by her back issues. Her last A1c was stable at approximately 6.9 %. Advised good blood glucose control. Recommended continuing with supportive shoes and inserts, replacing every 6 months. Additionally, recommended performing low-impact exercises and efforts at weight management. Advised her to increase her stretching exercises, massage, and manual manipulation. Recommended using over-the-counter topical pain cream such as BioFreeze for pain symptom management. The patient will return to the office in 1 year for re-evaluation. Greater than 20 minutes was spent before, during, and after evaluation for patient care.    No orders of the defined types were placed in this encounter.         Note is dictated utilizing voice recognition software. Unfortunately this leads to occasional typographical errors. I apologize in advance if the situation occurs. If questions occur please do not hesitate to call our office.    Transcribed from ambient dictation for JOSEPH Staley DPM by Marline Boyd.  02/21/24   12:02 EST    Patient or patient representative verbalized consent to the visit recording.  I have personally performed the services described in this document as transcribed by the above individual, and it is both accurate and complete.

## 2024-02-22 ENCOUNTER — OFFICE VISIT (OUTPATIENT)
Dept: FAMILY MEDICINE CLINIC | Facility: CLINIC | Age: 64
End: 2024-02-22
Payer: COMMERCIAL

## 2024-02-22 VITALS — BODY MASS INDEX: 50.61 KG/M2 | WEIGHT: 275 LBS | HEIGHT: 62 IN

## 2024-02-22 DIAGNOSIS — Z23 NEED FOR COVID-19 VACCINE: ICD-10-CM

## 2024-02-22 DIAGNOSIS — R35.1 NOCTURIA: ICD-10-CM

## 2024-02-22 DIAGNOSIS — D50.9 IRON DEFICIENCY ANEMIA, UNSPECIFIED IRON DEFICIENCY ANEMIA TYPE: ICD-10-CM

## 2024-02-22 DIAGNOSIS — E11.65 TYPE 2 DIABETES MELLITUS WITH HYPERGLYCEMIA, WITHOUT LONG-TERM CURRENT USE OF INSULIN: ICD-10-CM

## 2024-02-22 DIAGNOSIS — E78.00 PURE HYPERCHOLESTEROLEMIA: ICD-10-CM

## 2024-02-22 DIAGNOSIS — E03.9 HYPOTHYROIDISM, UNSPECIFIED TYPE: ICD-10-CM

## 2024-02-22 DIAGNOSIS — I10 ESSENTIAL HYPERTENSION: Primary | ICD-10-CM

## 2024-02-22 LAB
EXPIRATION DATE: ABNORMAL
HBA1C MFR BLD: 6.6 % (ref 4.5–5.7)
Lab: ABNORMAL

## 2024-02-22 NOTE — PROGRESS NOTES
02/22/2024    Assessment & Plan     This 63-year-old patient presents today for follow-up of hypertension and diabetes.  She relates she is taking her medication as prescribed.  She relates that her glucose level today is the highest it has been in quite a while and 155.    Her previous hemoglobin A1c done 3 months or so ago was 6.8.    Her hemoglobin A1c today is 6.6.     Her blood pressure was 140/90 in the left arm sitting position 5 cuff.  Note is made the patient had an extremely high sodium meal last night.    Diagnoses and all orders for this visit:    1. Essential hypertension (Primary)  -     Comprehensive Metabolic Panel    2. Type 2 diabetes mellitus with hyperglycemia, without long-term current use of insulin  -     POCT glycated hemoglobin, total  -     MicroAlbumin, Urine, Random - Urine, Clean Catch; Future    3. Pure hypercholesterolemia  -     Lipid Panel With / Chol / HDL Ratio    4. Nocturia  -     Urinalysis With Culture If Indicated -    5. Iron deficiency anemia, unspecified iron deficiency anemia type  -     CBC & Differential    6. Hypothyroidism, unspecified type  -     TSH Rfx On Abnormal To Free T4         Plan:  1.)  Comprehensive metabolic profile, CBC, lipid profile  2.)  Schedule for physical examination next few months  3.)  Follow-up for diabetes in 4 months.         CC: Diabetes (F/U---unable to hear bp---no other issues)  .        HPI  History of Present Illness     Subjective   Katharine Mitchell is a 63 y.o. female.      The following portions of the patient's history were reviewed and updated as appropriate: allergies, current medications, past family history, past medical history, past social history, past surgical history, and problem list.    Problem List  Patient Active Problem List   Diagnosis    Abnormal EKG    Essential hypertension    Preop cardiovascular exam    Osteoarthritis of right knee    Osteoarthritis of left knee    Type 2 diabetes mellitus    Class 3 obesity  due to excess calories in adult    CAROLE (obstructive sleep apnea)    Screening for colon cancer    Polyp of transverse colon    Asthma    Diabetic peripheral neuropathy associated with type 2 diabetes mellitus    Inflammatory and toxic neuropathy    Endometrial hyperplasia with atypia    PMB (postmenopausal bleeding)       Past Medical History  Past Medical History:   Diagnosis Date    Abnormal EKG 01/09/2017    Allergic     Zestril    Arthritis     Asthma August 2020    Cholelithiasis 1986    Gallbladder removed    Diabetes mellitus     TYPE 2    Gout 2021    Hypertension     Knee pain     Neuropathy in diabetes 2021    Obesity     Pinched nerve in neck 06/30/2023    Sleep apnea     DOES NOT WEAR CPAP    UTI (urinary tract infection)     TREATED FOR UTI JAN 2017       Surgical History  Past Surgical History:   Procedure Laterality Date    CHOLECYSTECTOMY      COLONOSCOPY      COLONOSCOPY N/A 01/06/2020    Procedure: COLONOSCOPY TO CECUM WITH COLD BIOPSY POLYPECTOMY AND 2 ML SALINE LIFT INJECTION;  Surgeon: Eva Rizvi MD;  Location: [facility] ENDOSCOPY;  Service: General    GALLBLADDER SURGERY      HYSTERECTOMY  10/04/2023    JOINT REPLACEMENT      KNEE ARTHROSCOPY Left     PILONIDAL CYSTECTOMY      CA ARTHRP KNE CONDYLE&PLATU MEDIAL&LAT COMPARTMENTS Right 02/20/2017    Procedure: RT TOTAL KNEE ARTHROPLASTY;  Surgeon: Lamont Morales MD;  Location: [facility] MAIN OR;  Service: Orthopedics    TOTAL KNEE ARTHROPLASTY Left 05/14/2018    Procedure: LEFT TOTAL KNEE ARTHROPLASTY;  Surgeon: Lamont Morales MD;  Location: [facility] MAIN OR;  Service: Orthopedics       Family History  Family History   Problem Relation Age of Onset    Arthritis Mother     Cancer Mother     Hypertension Mother     Kidney disease Mother     Hypertension Father     Diabetes Paternal Grandmother     Malig Hyperthermia Neg Hx        Social History  Social History    Tobacco Use      Smoking status: Never      Smokeless tobacco: Never       Is the  Patient a current tobacco user? No    Allergies  Allergies   Allergen Reactions    Lisinopril Other (See Comments), Swelling and Provider Review Needed     LIP/ MOUTH SWELLING       Current Medications    Current Outpatient Medications:     Advair Diskus 100-50 MCG/ACT DISKUS, INHALE 1 PUFF BY MOUTH EVERY 12 HOURS. RINSE MOUTH AFTER USE, Disp: , Rfl:     azelastine (ASTELIN) 0.1 % nasal spray, 1 spray into the nostril(s) as directed by provider Every Evening., Disp: , Rfl:     Blood Glucose Monitoring Suppl (ONE TOUCH ULTRA 2) W/DEVICE kit, , Disp: , Rfl:     furosemide (LASIX) 20 MG tablet, TAKE 1 TABLET BY MOUTH DAILY AS NEEDED, Disp: 20 tablet, Rfl: 3    glucose blood (ONE TOUCH ULTRA TEST) test strip, 1 bid, Disp: 100 each, Rfl: 5    meloxicam (MOBIC) 15 MG tablet, Take 1 tablet by mouth Daily., Disp: , Rfl:     metFORMIN (GLUCOPHAGE) 500 MG tablet, TAKE 2 TABLETS BY MOUTH TWICE DAILY WITH FOOD, Disp: 360 tablet, Rfl: 2    Multiple Vitamins-Minerals (MULTIVITAMIN ADULT PO), Take 1 tablet by mouth Every Morning. To stop before surgery, Disp: , Rfl:     naproxen sodium (ALEVE) 220 MG tablet, Take 1 tablet by mouth., Disp: , Rfl:     NIFEdipine XL (PROCARDIA XL) 60 MG 24 hr tablet, TAKE 1 TABLET BY MOUTH TWICE DAILY, Disp: 180 tablet, Rfl: 0    OneTouch Delica Lancets 33G misc, Test glucose daily, Disp: 100 each, Rfl: 5    rosuvastatin (CRESTOR) 10 MG tablet, Take 1 tablet by mouth Every 3 (Three) Days., Disp: , Rfl:     SITagliptin (Januvia) 100 MG tablet, TAKE 1 TABLET BY MOUTH DAILY, Disp: 90 tablet, Rfl: 0    spironolactone (ALDACTONE) 25 MG tablet, TAKE 2 TABLETS BY MOUTH EVERY MORNING AND 1 TABLET EVERY EVENING, Disp: 90 tablet, Rfl: 5    TURMERIC PO, Take  by mouth., Disp: , Rfl:     Unable to find, 1 each 1 (One) Time. Sea dan, Disp: , Rfl:      Review of System  Review of Systems   Constitutional:  Negative for unexpected weight loss.   Eyes:  Negative for blurred vision.   Endocrine: Negative for  polydipsia and polyuria.   Neurological:  Negative for tremors, speech difficulty and confusion.     I have reviewed and confirmed the accuracy of the ROS as documented by the MA/LPN/RN Poncho Lee MD    There were no vitals filed for this visit.  Body mass index is 50.3 kg/m².    Objective     Physical Exam  Physical Exam  Vitals and nursing note reviewed.   Constitutional:       Appearance: She is well-developed.   HENT:      Head: Normocephalic and atraumatic.   Eyes:      Conjunctiva/sclera: Conjunctivae normal.   Cardiovascular:      Rate and Rhythm: Normal rate and regular rhythm.      Heart sounds: Normal heart sounds.   Pulmonary:      Effort: Pulmonary effort is normal.      Breath sounds: Normal breath sounds.   Abdominal:      General: Bowel sounds are normal.      Palpations: Abdomen is soft.   Musculoskeletal:         General: Normal range of motion.      Cervical back: Normal range of motion and neck supple.   Skin:     General: Skin is warm and dry.   Neurological:      Mental Status: She is alert and oriented to person, place, and time.   Psychiatric:         Behavior: Behavior normal.             Poncho Lee MD  02/22/2024

## 2024-02-23 LAB
ALBUMIN SERPL-MCNC: 4.4 G/DL (ref 3.5–5.2)
ALBUMIN/GLOB SERPL: 1.7 G/DL
ALP SERPL-CCNC: 80 U/L (ref 39–117)
ALT SERPL-CCNC: 16 U/L (ref 1–33)
APPEARANCE UR: CLEAR
AST SERPL-CCNC: 22 U/L (ref 1–32)
BACTERIA #/AREA URNS HPF: NORMAL /HPF
BASOPHILS # BLD AUTO: 0.08 10*3/MM3 (ref 0–0.2)
BASOPHILS NFR BLD AUTO: 0.8 % (ref 0–1.5)
BILIRUB SERPL-MCNC: 0.3 MG/DL (ref 0–1.2)
BILIRUB UR QL STRIP: NEGATIVE
BUN SERPL-MCNC: 22 MG/DL (ref 8–23)
BUN/CREAT SERPL: 18.3 (ref 7–25)
CALCIUM SERPL-MCNC: 9.9 MG/DL (ref 8.6–10.5)
CASTS URNS QL MICRO: NORMAL /LPF
CHLORIDE SERPL-SCNC: 107 MMOL/L (ref 98–107)
CHOLEST SERPL-MCNC: 131 MG/DL (ref 0–200)
CHOLEST/HDLC SERPL: 2.73 {RATIO}
CO2 SERPL-SCNC: 22.3 MMOL/L (ref 22–29)
COLOR UR: YELLOW
CREAT SERPL-MCNC: 1.2 MG/DL (ref 0.57–1)
EGFRCR SERPLBLD CKD-EPI 2021: 50.7 ML/MIN/1.73
EOSINOPHIL # BLD AUTO: 0.26 10*3/MM3 (ref 0–0.4)
EOSINOPHIL NFR BLD AUTO: 2.5 % (ref 0.3–6.2)
EPI CELLS #/AREA URNS HPF: NORMAL /HPF (ref 0–10)
ERYTHROCYTE [DISTWIDTH] IN BLOOD BY AUTOMATED COUNT: 14.6 % (ref 12.3–15.4)
GLOBULIN SER CALC-MCNC: 2.6 GM/DL
GLUCOSE SERPL-MCNC: 117 MG/DL (ref 65–99)
GLUCOSE UR QL STRIP: NEGATIVE
HCT VFR BLD AUTO: 42.2 % (ref 34–46.6)
HDLC SERPL-MCNC: 48 MG/DL (ref 40–60)
HGB BLD-MCNC: 14.3 G/DL (ref 12–15.9)
HGB UR QL STRIP: NEGATIVE
IMM GRANULOCYTES # BLD AUTO: 0.04 10*3/MM3 (ref 0–0.05)
IMM GRANULOCYTES NFR BLD AUTO: 0.4 % (ref 0–0.5)
KETONES UR QL STRIP: NEGATIVE
LDLC SERPL CALC-MCNC: 61 MG/DL (ref 0–100)
LEUKOCYTE ESTERASE UR QL STRIP: NEGATIVE
LYMPHOCYTES # BLD AUTO: 2.64 10*3/MM3 (ref 0.7–3.1)
LYMPHOCYTES NFR BLD AUTO: 24.9 % (ref 19.6–45.3)
MCH RBC QN AUTO: 28.8 PG (ref 26.6–33)
MCHC RBC AUTO-ENTMCNC: 33.9 G/DL (ref 31.5–35.7)
MCV RBC AUTO: 84.9 FL (ref 79–97)
MICRO URNS: ABNORMAL
MONOCYTES # BLD AUTO: 0.69 10*3/MM3 (ref 0.1–0.9)
MONOCYTES NFR BLD AUTO: 6.5 % (ref 5–12)
NEUTROPHILS # BLD AUTO: 6.9 10*3/MM3 (ref 1.7–7)
NEUTROPHILS NFR BLD AUTO: 64.9 % (ref 42.7–76)
NITRITE UR QL STRIP: NEGATIVE
NRBC BLD AUTO-RTO: 0 /100 WBC (ref 0–0.2)
PH UR STRIP: 5.5 [PH] (ref 5–7.5)
PLATELET # BLD AUTO: 350 10*3/MM3 (ref 140–450)
POTASSIUM SERPL-SCNC: 5.1 MMOL/L (ref 3.5–5.2)
PROT SERPL-MCNC: 7 G/DL (ref 6–8.5)
PROT UR QL STRIP: ABNORMAL
RBC # BLD AUTO: 4.97 10*6/MM3 (ref 3.77–5.28)
RBC #/AREA URNS HPF: NORMAL /HPF (ref 0–2)
SODIUM SERPL-SCNC: 143 MMOL/L (ref 136–145)
SP GR UR STRIP: 1.02 (ref 1–1.03)
TRIGL SERPL-MCNC: 123 MG/DL (ref 0–150)
TSH SERPL DL<=0.005 MIU/L-ACNC: 2.86 UIU/ML (ref 0.27–4.2)
URINALYSIS REFLEX: ABNORMAL
UROBILINOGEN UR STRIP-MCNC: 0.2 MG/DL (ref 0.2–1)
VLDLC SERPL CALC-MCNC: 22 MG/DL (ref 5–40)
WBC # BLD AUTO: 10.61 10*3/MM3 (ref 3.4–10.8)
WBC #/AREA URNS HPF: NORMAL /HPF (ref 0–5)

## 2024-02-29 ENCOUNTER — PATIENT ROUNDING (BHMG ONLY) (OUTPATIENT)
Dept: URGENT CARE | Facility: CLINIC | Age: 64
End: 2024-02-29
Payer: COMMERCIAL

## 2024-02-29 NOTE — ED NOTES
Thank you for letting us care for you in your recent visit to our urgent care center. We would love to hear about your experience with us. Was this the first time you have visited our location?    We’re always looking for ways to make our patients’ experiences even better. Do you have any recommendations on ways we may improve?     I appreciate you taking the time to respond. Please be on the lookout for a survey about your recent visit from [employer] via text or email. We would greatly appreciate if you could fill that out and turn it back in. We want your voice to be heard and we value your feedback.   Thank you for choosing [facility] for your healthcare needs.

## 2024-03-11 RX ORDER — ROSUVASTATIN CALCIUM 10 MG/1
10 TABLET, COATED ORAL
Qty: 30 TABLET | Refills: 5 | Status: SHIPPED | OUTPATIENT
Start: 2024-03-11

## 2024-05-08 DIAGNOSIS — I10 ESSENTIAL HYPERTENSION: ICD-10-CM

## 2024-05-08 DIAGNOSIS — E11.65 TYPE 2 DIABETES MELLITUS WITH HYPERGLYCEMIA, WITHOUT LONG-TERM CURRENT USE OF INSULIN: ICD-10-CM

## 2024-05-08 RX ORDER — SITAGLIPTIN 100 MG/1
100 TABLET, FILM COATED ORAL DAILY
Qty: 90 TABLET | Refills: 0 | Status: SHIPPED | OUTPATIENT
Start: 2024-05-08

## 2024-05-08 RX ORDER — SPIRONOLACTONE 25 MG/1
TABLET ORAL
Qty: 90 TABLET | Refills: 5 | Status: SHIPPED | OUTPATIENT
Start: 2024-05-08

## 2024-05-09 RX ORDER — NIFEDIPINE 60 MG/1
60 TABLET, EXTENDED RELEASE ORAL 2 TIMES DAILY
Qty: 180 TABLET | Refills: 0 | Status: SHIPPED | OUTPATIENT
Start: 2024-05-09

## 2024-05-13 DIAGNOSIS — I10 ESSENTIAL HYPERTENSION: ICD-10-CM

## 2024-05-13 RX ORDER — FUROSEMIDE 20 MG/1
TABLET ORAL
Qty: 20 TABLET | Refills: 3 | Status: SHIPPED | OUTPATIENT
Start: 2024-05-13

## 2024-05-31 ENCOUNTER — OFFICE VISIT (OUTPATIENT)
Dept: FAMILY MEDICINE CLINIC | Facility: CLINIC | Age: 64
End: 2024-05-31
Payer: COMMERCIAL

## 2024-05-31 VITALS
SYSTOLIC BLOOD PRESSURE: 136 MMHG | WEIGHT: 273 LBS | BODY MASS INDEX: 50.24 KG/M2 | DIASTOLIC BLOOD PRESSURE: 80 MMHG | HEIGHT: 62 IN

## 2024-05-31 DIAGNOSIS — I10 ESSENTIAL HYPERTENSION: Chronic | ICD-10-CM

## 2024-05-31 DIAGNOSIS — B02.9 HERPES ZOSTER WITHOUT COMPLICATION: Primary | ICD-10-CM

## 2024-05-31 PROCEDURE — 99213 OFFICE O/P EST LOW 20 MIN: CPT | Performed by: INTERNAL MEDICINE

## 2024-05-31 NOTE — PROGRESS NOTES
05/31/2024    Assessment & Plan       This pleasant 63-year-old presents with a complaint of itching on her back for the past 7 days.  She relates that initially the itching occurred in a very mild fashion but then proceeded to the more prominent itching and has decreased significantly now.  She has a faint reddish rash at this point with a few vesicles near the superior pole of this elliptical shaped rash which measures at its longest 10 cm and at its shortest 4 cm.  She relates that she has not had no trauma to the back and indeed this will be difficult to find because it is obscured by her bra attachment in the back..  Corresponds to T8.  Is pretty much resolved at this point.  I feel this was zoster.    Patient relates that she got to injections for shingles about 2 years ago.      Patient's blood pressure was 136/80 in the left arm large cuff.  There are no diagnoses linked to this encounter.              CC: Rash (Left side of back.  Itching started about 5 days ago.------Unable to hear bp)  .        HPI  Rash         Subjective   Katharine Mitchell is a 63 y.o. female.      The following portions of the patient's history were reviewed and updated as appropriate: allergies, current medications, past family history, past medical history, past social history, past surgical history, and problem list.    Problem List  Patient Active Problem List   Diagnosis    Abnormal EKG    Essential hypertension    Preop cardiovascular exam    Osteoarthritis of right knee    Osteoarthritis of left knee    Type 2 diabetes mellitus    Class 3 obesity due to excess calories in adult    CAROLE (obstructive sleep apnea)    Screening for colon cancer    Polyp of transverse colon    Asthma    Diabetic peripheral neuropathy associated with type 2 diabetes mellitus    Inflammatory and toxic neuropathy    Endometrial hyperplasia with atypia    PMB (postmenopausal bleeding)       Past Medical History  Past Medical History:   Diagnosis Date     Abnormal EKG 01/09/2017    Allergic     Zestril    Arthritis     Asthma August 2020    Cholelithiasis 1986    Gallbladder removed    Diabetes mellitus     TYPE 2    Gout 2021    Hypertension     Knee pain     Neuropathy in diabetes 2021    Obesity     Pinched nerve in neck 06/30/2023    Sleep apnea     DOES NOT WEAR CPAP    UTI (urinary tract infection)     TREATED FOR UTI JAN 2017       Surgical History  Past Surgical History:   Procedure Laterality Date    CHOLECYSTECTOMY      COLONOSCOPY      COLONOSCOPY N/A 01/06/2020    Procedure: COLONOSCOPY TO CECUM WITH COLD BIOPSY POLYPECTOMY AND 2 ML SALINE LIFT INJECTION;  Surgeon: Eva Rizvi MD;  Location: [facility] ENDOSCOPY;  Service: General    GALLBLADDER SURGERY      HYSTERECTOMY  10/04/2023    JOINT REPLACEMENT      KNEE ARTHROSCOPY Left     PILONIDAL CYSTECTOMY      MN ARTHRP KNE CONDYLE&PLATU MEDIAL&LAT COMPARTMENTS Right 02/20/2017    Procedure: RT TOTAL KNEE ARTHROPLASTY;  Surgeon: Lamont Morales MD;  Location: [facility] MAIN OR;  Service: Orthopedics    TOTAL KNEE ARTHROPLASTY Left 05/14/2018    Procedure: LEFT TOTAL KNEE ARTHROPLASTY;  Surgeon: Lamont Morales MD;  Location: [facility] MAIN OR;  Service: Orthopedics       Family History  Family History   Problem Relation Age of Onset    Arthritis Mother     Cancer Mother     Hypertension Mother     Kidney disease Mother     Hypertension Father     Diabetes Paternal Grandmother     Malig Hyperthermia Neg Hx        Social History  Social History    Tobacco Use      Smoking status: Never        Passive exposure: Never      Smokeless tobacco: Never       Is the Patient a current tobacco user? No    Allergies  Allergies   Allergen Reactions    Lisinopril Other (See Comments), Swelling and Provider Review Needed     LIP/ MOUTH SWELLING       Current Medications    Current Outpatient Medications:     Advair Diskus 100-50 MCG/ACT DISKUS, INHALE 1 PUFF BY MOUTH EVERY 12 HOURS. RINSE MOUTH AFTER USE, Disp: , Rfl:      azelastine (ASTELIN) 0.1 % nasal spray, 1 spray into the nostril(s) as directed by provider Every Evening., Disp: , Rfl:     benzonatate (TESSALON) 200 MG capsule, Take 1 capsule by mouth 3 (Three) Times a Day As Needed for Cough., Disp: 15 capsule, Rfl: 0    Blood Glucose Monitoring Suppl (ONE TOUCH ULTRA 2) W/DEVICE kit, , Disp: , Rfl:     furosemide (LASIX) 20 MG tablet, TAKE 1 TABLET BY MOUTH DAILY AS NEEDED, Disp: 20 tablet, Rfl: 3    glucose blood (ONE TOUCH ULTRA TEST) test strip, 1 bid, Disp: 100 each, Rfl: 5    Januvia 100 MG tablet, TAKE 1 TABLET BY MOUTH DAILY, Disp: 90 tablet, Rfl: 0    meloxicam (MOBIC) 15 MG tablet, Take 1 tablet by mouth Daily., Disp: , Rfl:     metFORMIN (GLUCOPHAGE) 500 MG tablet, TAKE 2 TABLETS BY MOUTH TWICE DAILY WITH FOOD, Disp: 360 tablet, Rfl: 2    Multiple Vitamins-Minerals (MULTIVITAMIN ADULT PO), Take 1 tablet by mouth Every Morning. To stop before surgery, Disp: , Rfl:     naproxen sodium (ALEVE) 220 MG tablet, Take 1 tablet by mouth., Disp: , Rfl:     NIFEdipine XL (PROCARDIA XL) 60 MG 24 hr tablet, TAKE 1 TABLET BY MOUTH TWICE DAILY, Disp: 180 tablet, Rfl: 0    OneTouch Delica Lancets 33G misc, Test glucose daily, Disp: 100 each, Rfl: 5    rosuvastatin (CRESTOR) 10 MG tablet, TAKE 1 TABLET BY MOUTH EVERY 3 DAYS, Disp: 30 tablet, Rfl: 5    spironolactone (ALDACTONE) 25 MG tablet, TAKE 2 TABLETS BY MOUTH EVERY MORNING AND 1 TABLET EVERY EVENING, Disp: 90 tablet, Rfl: 5    TURMERIC PO, Take  by mouth., Disp: , Rfl:     Unable to find, 1 each 1 (One) Time. Sea dan, Disp: , Rfl:      Review of System  Review of Systems   Skin:  Positive for rash.     I have reviewed and confirmed the accuracy of the ROS as documented by the MA/LPN/RN Poncho Lee MD    There were no vitals filed for this visit.  Body mass index is 49.93 kg/m².    Objective     Physical Exam  Physical Exam  Skin:     General: Skin is warm.      Findings: Rash present.      Comments: Small rash along T10  dermatome in the posterior.  Thorax             Poncho Lee MD  05/31/2024  Answers submitted by the patient for this visit:  Primary Reason for Visit (Submitted on 5/31/2024)  What is the primary reason for your visit?: Rash

## 2024-06-03 RX ORDER — SPIRONOLACTONE 25 MG/1
TABLET ORAL
Qty: 90 TABLET | Refills: 5 | Status: SHIPPED | OUTPATIENT
Start: 2024-06-03

## 2024-06-21 ENCOUNTER — OFFICE VISIT (OUTPATIENT)
Dept: FAMILY MEDICINE CLINIC | Facility: CLINIC | Age: 64
End: 2024-06-21
Payer: COMMERCIAL

## 2024-06-21 VITALS
SYSTOLIC BLOOD PRESSURE: 144 MMHG | DIASTOLIC BLOOD PRESSURE: 90 MMHG | WEIGHT: 274 LBS | HEIGHT: 62 IN | BODY MASS INDEX: 50.42 KG/M2

## 2024-06-21 DIAGNOSIS — E11.65 TYPE 2 DIABETES MELLITUS WITH HYPERGLYCEMIA, WITHOUT LONG-TERM CURRENT USE OF INSULIN: Primary | ICD-10-CM

## 2024-06-21 DIAGNOSIS — E78.5 HYPERLIPIDEMIA, UNSPECIFIED HYPERLIPIDEMIA TYPE: ICD-10-CM

## 2024-06-21 DIAGNOSIS — E03.9 HYPOTHYROIDISM, UNSPECIFIED TYPE: ICD-10-CM

## 2024-06-21 DIAGNOSIS — I10 ESSENTIAL HYPERTENSION: ICD-10-CM

## 2024-06-21 DIAGNOSIS — D50.9 IRON DEFICIENCY ANEMIA, UNSPECIFIED IRON DEFICIENCY ANEMIA TYPE: ICD-10-CM

## 2024-06-21 DIAGNOSIS — R35.1 NOCTURIA: ICD-10-CM

## 2024-06-21 LAB
EXPIRATION DATE: ABNORMAL
HBA1C MFR BLD: 6.9 % (ref 4.5–5.7)
Lab: ABNORMAL

## 2024-06-21 PROCEDURE — 99396 PREV VISIT EST AGE 40-64: CPT | Performed by: INTERNAL MEDICINE

## 2024-06-21 PROCEDURE — 83036 HEMOGLOBIN GLYCOSYLATED A1C: CPT | Performed by: INTERNAL MEDICINE

## 2024-06-21 NOTE — PROGRESS NOTES
06/21/2024    Assessment & Plan     This 63-year-old patient presents at this time for physical examination.  In the interim of visit she has been seen by pain management to receive injections for her low back pain and referred pain to the leg.  She relates that the pain injections have helped tremendously.    Regarding anticipatory guidance.  We discussed the importance of keeping her LDL cholesterol less than 100.  A LDL is pending from today.  We gave her a low-cholesterol diet sheet with instructions to initiate it with our direction.    Her hemoglobin A1c today shows good control at 6.9 for her diabetes.    Her blood pressure is elevated today at 144/90 in the left arm sitting position large cuff    Diagnoses and all orders for this visit:    1. Type 2 diabetes mellitus with hyperglycemia, without long-term current use of insulin (Primary)  -     POCT glycated hemoglobin, total    2. Essential hypertension  -     Comprehensive Metabolic Panel    3. Hyperlipidemia, unspecified hyperlipidemia type  -     Lipid Panel With / Chol / HDL Ratio    4. Iron deficiency anemia, unspecified iron deficiency anemia type  -     CBC & Differential    5. Hypothyroidism, unspecified type  -     TSH Rfx On Abnormal To Free T4    6. Nocturia  -     Urinalysis With Culture If Indicated -                  CC: Annual Exam (Unable to hear bp)  .        HPI  History of Present Illness     Subjective   Katharine Mitchell is a 63 y.o. female.      The following portions of the patient's history were reviewed and updated as appropriate: allergies, current medications, past family history, past medical history, past social history, past surgical history, and problem list.    Problem List  Patient Active Problem List   Diagnosis    Abnormal EKG    Essential hypertension    Preop cardiovascular exam    Osteoarthritis of right knee    Osteoarthritis of left knee    Type 2 diabetes mellitus    Class 3 obesity due to excess calories in adult     CAROLE (obstructive sleep apnea)    Screening for colon cancer    Polyp of transverse colon    Asthma    Diabetic peripheral neuropathy associated with type 2 diabetes mellitus    Inflammatory and toxic neuropathy    Endometrial hyperplasia with atypia    PMB (postmenopausal bleeding)       Past Medical History  Past Medical History:   Diagnosis Date    Abnormal EKG 01/09/2017    Allergic     Zestril    Arthritis     Asthma August 2020    Cholelithiasis 1986    Gallbladder removed    Diabetes mellitus     TYPE 2    Gout 2021    Hypertension     Knee pain     Neuropathy in diabetes 2021    Obesity     Pinched nerve in neck 06/30/2023    Sleep apnea     DOES NOT WEAR CPAP    UTI (urinary tract infection)     TREATED FOR UTI JAN 2017       Surgical History  Past Surgical History:   Procedure Laterality Date    CHOLECYSTECTOMY      COLONOSCOPY      COLONOSCOPY N/A 01/06/2020    Procedure: COLONOSCOPY TO CECUM WITH COLD BIOPSY POLYPECTOMY AND 2 ML SALINE LIFT INJECTION;  Surgeon: Eva Rizvi MD;  Location: [facility] ENDOSCOPY;  Service: General    GALLBLADDER SURGERY      HYSTERECTOMY  10/04/2023    JOINT REPLACEMENT      KNEE ARTHROSCOPY Left     PILONIDAL CYSTECTOMY      NE ARTHRP KNE CONDYLE&PLATU MEDIAL&LAT COMPARTMENTS Right 02/20/2017    Procedure: RT TOTAL KNEE ARTHROPLASTY;  Surgeon: Lamont Morales MD;  Location: [facility] MAIN OR;  Service: Orthopedics    TOTAL KNEE ARTHROPLASTY Left 05/14/2018    Procedure: LEFT TOTAL KNEE ARTHROPLASTY;  Surgeon: Lamont Morales MD;  Location: [facility] MAIN OR;  Service: Orthopedics       Family History  Family History   Problem Relation Age of Onset    Arthritis Mother     Cancer Mother     Hypertension Mother     Kidney disease Mother     Hypertension Father     Diabetes Paternal Grandmother     Malig Hyperthermia Neg Hx        Social History  Social History    Tobacco Use      Smoking status: Never        Passive exposure: Never      Smokeless tobacco: Never       Is the  Patient a current tobacco user? No    Allergies  Allergies   Allergen Reactions    Lisinopril Other (See Comments), Swelling and Provider Review Needed     LIP/ MOUTH SWELLING       Current Medications    Current Outpatient Medications:     Advair Diskus 100-50 MCG/ACT DISKUS, INHALE 1 PUFF BY MOUTH EVERY 12 HOURS. RINSE MOUTH AFTER USE, Disp: , Rfl:     azelastine (ASTELIN) 0.1 % nasal spray, 1 spray into the nostril(s) as directed by provider Every Evening., Disp: , Rfl:     benzonatate (TESSALON) 200 MG capsule, Take 1 capsule by mouth 3 (Three) Times a Day As Needed for Cough., Disp: 15 capsule, Rfl: 0    Blood Glucose Monitoring Suppl (ONE TOUCH ULTRA 2) W/DEVICE kit, , Disp: , Rfl:     furosemide (LASIX) 20 MG tablet, TAKE 1 TABLET BY MOUTH DAILY AS NEEDED, Disp: 20 tablet, Rfl: 3    glucose blood (ONE TOUCH ULTRA TEST) test strip, 1 bid, Disp: 100 each, Rfl: 5    Januvia 100 MG tablet, TAKE 1 TABLET BY MOUTH DAILY, Disp: 90 tablet, Rfl: 0    meloxicam (MOBIC) 15 MG tablet, Take 1 tablet by mouth Daily., Disp: , Rfl:     metFORMIN (GLUCOPHAGE) 500 MG tablet, TAKE 2 TABLETS BY MOUTH TWICE DAILY WITH FOOD, Disp: 360 tablet, Rfl: 2    Multiple Vitamins-Minerals (MULTIVITAMIN ADULT PO), Take 1 tablet by mouth Every Morning. To stop before surgery, Disp: , Rfl:     naproxen sodium (ALEVE) 220 MG tablet, Take 1 tablet by mouth., Disp: , Rfl:     NIFEdipine XL (PROCARDIA XL) 60 MG 24 hr tablet, TAKE 1 TABLET BY MOUTH TWICE DAILY, Disp: 180 tablet, Rfl: 0    OneTouch Delica Lancets 33G misc, Test glucose daily, Disp: 100 each, Rfl: 5    rosuvastatin (CRESTOR) 10 MG tablet, TAKE 1 TABLET BY MOUTH EVERY 3 DAYS, Disp: 30 tablet, Rfl: 5    spironolactone (ALDACTONE) 25 MG tablet, TAKE 2 TABLETS BY MOUTH EVERY MORNING AND 1 TABLET EVERY EVENING, Disp: 90 tablet, Rfl: 5    TURMERIC PO, Take  by mouth., Disp: , Rfl:     Unable to find, 1 each 1 (One) Time. Sea dan, Disp: , Rfl:      Review of System  Review of Systems  I  have reviewed and confirmed the accuracy of the ROS as documented by the MA/LPN/RN Poncho Lee MD    There were no vitals filed for this visit.  Body mass index is 50.12 kg/m².    Objective     Physical Exam  Physical Exam        Poncho Lee MD  06/21/2024

## 2024-06-22 LAB
ALBUMIN SERPL-MCNC: 4.4 G/DL (ref 3.5–5.2)
ALBUMIN/GLOB SERPL: 1.6 G/DL
ALP SERPL-CCNC: 88 U/L (ref 39–117)
ALT SERPL-CCNC: 18 U/L (ref 1–33)
APPEARANCE UR: CLEAR
AST SERPL-CCNC: 20 U/L (ref 1–32)
BACTERIA #/AREA URNS HPF: ABNORMAL /[HPF]
BASOPHILS # BLD AUTO: 0.07 10*3/MM3 (ref 0–0.2)
BASOPHILS NFR BLD AUTO: 0.6 % (ref 0–1.5)
BILIRUB SERPL-MCNC: 0.3 MG/DL (ref 0–1.2)
BILIRUB UR QL STRIP: NEGATIVE
BUN SERPL-MCNC: 24 MG/DL (ref 8–23)
BUN/CREAT SERPL: 21.2 (ref 7–25)
CALCIUM SERPL-MCNC: 10.1 MG/DL (ref 8.6–10.5)
CASTS URNS MICRO: ABNORMAL
CASTS URNS QL MICRO: PRESENT /LPF
CHLORIDE SERPL-SCNC: 106 MMOL/L (ref 98–107)
CHOLEST SERPL-MCNC: 156 MG/DL (ref 0–200)
CHOLEST/HDLC SERPL: 3.12 {RATIO}
CO2 SERPL-SCNC: 22.4 MMOL/L (ref 22–29)
COLOR UR: YELLOW
CREAT SERPL-MCNC: 1.13 MG/DL (ref 0.57–1)
EGFRCR SERPLBLD CKD-EPI 2021: 54.4 ML/MIN/1.73
EOSINOPHIL # BLD AUTO: 0.28 10*3/MM3 (ref 0–0.4)
EOSINOPHIL NFR BLD AUTO: 2.4 % (ref 0.3–6.2)
EPI CELLS #/AREA URNS HPF: ABNORMAL /HPF (ref 0–10)
ERYTHROCYTE [DISTWIDTH] IN BLOOD BY AUTOMATED COUNT: 13.9 % (ref 12.3–15.4)
GLOBULIN SER CALC-MCNC: 2.8 GM/DL
GLUCOSE SERPL-MCNC: 143 MG/DL (ref 65–99)
GLUCOSE UR QL STRIP: NEGATIVE
HCT VFR BLD AUTO: 41.6 % (ref 34–46.6)
HDLC SERPL-MCNC: 50 MG/DL (ref 40–60)
HGB BLD-MCNC: 13.9 G/DL (ref 12–15.9)
HGB UR QL STRIP: NEGATIVE
IMM GRANULOCYTES # BLD AUTO: 0.12 10*3/MM3 (ref 0–0.05)
IMM GRANULOCYTES NFR BLD AUTO: 1 % (ref 0–0.5)
KETONES UR QL STRIP: NEGATIVE
LDLC SERPL CALC-MCNC: 82 MG/DL (ref 0–100)
LEUKOCYTE ESTERASE UR QL STRIP: NEGATIVE
LYMPHOCYTES # BLD AUTO: 2.27 10*3/MM3 (ref 0.7–3.1)
LYMPHOCYTES NFR BLD AUTO: 19.4 % (ref 19.6–45.3)
MCH RBC QN AUTO: 29.2 PG (ref 26.6–33)
MCHC RBC AUTO-ENTMCNC: 33.4 G/DL (ref 31.5–35.7)
MCV RBC AUTO: 87.4 FL (ref 79–97)
MICRO URNS: ABNORMAL
MONOCYTES # BLD AUTO: 0.66 10*3/MM3 (ref 0.1–0.9)
MONOCYTES NFR BLD AUTO: 5.6 % (ref 5–12)
MUCOUS THREADS URNS QL MICRO: PRESENT
NEUTROPHILS # BLD AUTO: 8.3 10*3/MM3 (ref 1.7–7)
NEUTROPHILS NFR BLD AUTO: 71 % (ref 42.7–76)
NITRITE UR QL STRIP: NEGATIVE
NRBC BLD AUTO-RTO: 0 /100 WBC (ref 0–0.2)
PH UR STRIP: 5.5 [PH] (ref 5–7.5)
PLATELET # BLD AUTO: 369 10*3/MM3 (ref 140–450)
POTASSIUM SERPL-SCNC: 4.9 MMOL/L (ref 3.5–5.2)
PROT SERPL-MCNC: 7.2 G/DL (ref 6–8.5)
PROT UR QL STRIP: ABNORMAL
RBC # BLD AUTO: 4.76 10*6/MM3 (ref 3.77–5.28)
RBC #/AREA URNS HPF: ABNORMAL /HPF (ref 0–2)
SODIUM SERPL-SCNC: 141 MMOL/L (ref 136–145)
SP GR UR STRIP: 1.02 (ref 1–1.03)
TRIGL SERPL-MCNC: 136 MG/DL (ref 0–150)
TSH SERPL DL<=0.005 MIU/L-ACNC: 3.65 UIU/ML (ref 0.27–4.2)
URINALYSIS REFLEX: ABNORMAL
UROBILINOGEN UR STRIP-MCNC: 0.2 MG/DL (ref 0.2–1)
VLDLC SERPL CALC-MCNC: 24 MG/DL (ref 5–40)
WBC # BLD AUTO: 11.7 10*3/MM3 (ref 3.4–10.8)
WBC #/AREA URNS HPF: ABNORMAL /HPF (ref 0–5)

## 2024-07-04 ENCOUNTER — PATIENT ROUNDING (BHMG ONLY) (OUTPATIENT)
Dept: URGENT CARE | Facility: CLINIC | Age: 64
End: 2024-07-04
Payer: COMMERCIAL

## 2024-07-05 NOTE — ED NOTES
Thank you for letting us care for you in your recent visit to our urgent care center. We would love to hear about your experience with us. Was this the first time you have visited our location?    We’re always looking for ways to make our patients’ experiences even better. Do you have any recommendations on ways we may improve?     I appreciate you taking the time to respond. Please be on the lookout for a survey about your recent visit from [employer] via text or email. We would greatly appreciate if you could fill that out and turn it back in. We want your voice to be heard and we value your feedback.   Thank you for choosing [facility] for your healthcare needs.

## 2024-07-08 ENCOUNTER — TELEPHONE (OUTPATIENT)
Dept: FAMILY MEDICINE CLINIC | Facility: CLINIC | Age: 64
End: 2024-07-08

## 2024-07-08 RX ORDER — SCOLOPAMINE TRANSDERMAL SYSTEM 1 MG/1
PATCH, EXTENDED RELEASE TRANSDERMAL
Qty: 5 PATCH | Refills: 0 | Status: SHIPPED | OUTPATIENT
Start: 2024-07-08

## 2024-07-08 NOTE — TELEPHONE ENCOUNTER
Caller: Katharine Mitchell    Relationship: Self    Best call back number: [phone]     What medication are you requesting:  A PATCH MEDICATION  FOR SEA SICKNESS .    What are your current symptoms: SEA SICKNESS     How long have you been experiencing symptoms:     Have you had these symptoms before:    [] Yes  [] No    Have you been treated for these symptoms before:   [] Yes  [] No    If a prescription is needed, what is your preferred pharmacy and phone number: [id number] [place] [id number] [address] AT [facility] [phone] [facility] [phone]      Additional notes: PATIENT IS LEAVING FOR A CRUISE ON FRIDAY AND WOULD LIKE A PATCH FOR SEA SICKNESS.

## 2024-07-30 ENCOUNTER — TELEPHONE (OUTPATIENT)
Dept: PODIATRY | Facility: CLINIC | Age: 64
End: 2024-07-30
Payer: COMMERCIAL

## 2024-07-30 NOTE — TELEPHONE ENCOUNTER
Provider: JESSICA    Caller: PATIENT    Phone Number: [phone]    Reason for Call: PATIENT STATES SHE NEEDS TO GET SOME NEW ORTHOTICS FOR HER SHOES. SHE IS ASKING IF THIS IS SOMETHING SHE CAN  AT THE OFFICE. PLEASE CALL HER TO DISCUSS.

## 2024-08-04 DIAGNOSIS — I10 ESSENTIAL HYPERTENSION: ICD-10-CM

## 2024-08-05 RX ORDER — SITAGLIPTIN 100 MG/1
100 TABLET, FILM COATED ORAL DAILY
Qty: 90 TABLET | Refills: 0 | Status: SHIPPED | OUTPATIENT
Start: 2024-08-05

## 2024-08-06 RX ORDER — NIFEDIPINE 60 MG/1
60 TABLET, EXTENDED RELEASE ORAL 2 TIMES DAILY
Qty: 180 TABLET | Refills: 2 | Status: SHIPPED | OUTPATIENT
Start: 2024-08-06

## 2024-10-21 ENCOUNTER — OFFICE VISIT (OUTPATIENT)
Dept: FAMILY MEDICINE CLINIC | Facility: CLINIC | Age: 64
End: 2024-10-21
Payer: MEDICARE

## 2024-10-21 VITALS
HEIGHT: 62 IN | SYSTOLIC BLOOD PRESSURE: 146 MMHG | BODY MASS INDEX: 49.5 KG/M2 | DIASTOLIC BLOOD PRESSURE: 89 MMHG | HEART RATE: 73 BPM | OXYGEN SATURATION: 98 % | WEIGHT: 269 LBS

## 2024-10-21 DIAGNOSIS — E11.65 TYPE 2 DIABETES MELLITUS WITH HYPERGLYCEMIA, WITHOUT LONG-TERM CURRENT USE OF INSULIN: ICD-10-CM

## 2024-10-21 DIAGNOSIS — Z23 FLU VACCINE NEED: Primary | ICD-10-CM

## 2024-10-21 DIAGNOSIS — I10 ESSENTIAL HYPERTENSION: ICD-10-CM

## 2024-10-21 LAB
EXPIRATION DATE: ABNORMAL
HBA1C MFR BLD: 6.7 % (ref 4.5–5.7)
Lab: ABNORMAL

## 2024-10-21 PROCEDURE — 99214 OFFICE O/P EST MOD 30 MIN: CPT | Performed by: INTERNAL MEDICINE

## 2024-10-21 PROCEDURE — 1126F AMNT PAIN NOTED NONE PRSNT: CPT | Performed by: INTERNAL MEDICINE

## 2024-10-21 PROCEDURE — 3044F HG A1C LEVEL LT 7.0%: CPT | Performed by: INTERNAL MEDICINE

## 2024-10-21 PROCEDURE — 3079F DIAST BP 80-89 MM HG: CPT | Performed by: INTERNAL MEDICINE

## 2024-10-21 PROCEDURE — 83036 HEMOGLOBIN GLYCOSYLATED A1C: CPT | Performed by: INTERNAL MEDICINE

## 2024-10-21 PROCEDURE — 90662 IIV NO PRSV INCREASED AG IM: CPT | Performed by: INTERNAL MEDICINE

## 2024-10-21 PROCEDURE — G0008 ADMIN INFLUENZA VIRUS VAC: HCPCS | Performed by: INTERNAL MEDICINE

## 2024-10-21 PROCEDURE — 3077F SYST BP >= 140 MM HG: CPT | Performed by: INTERNAL MEDICINE

## 2024-10-21 RX ORDER — SPIRONOLACTONE 25 MG/1
TABLET ORAL
Qty: 360 TABLET | Refills: 1 | Status: SHIPPED | OUTPATIENT
Start: 2024-10-21

## 2024-10-21 RX ORDER — MELOXICAM 7.5 MG/1
1 TABLET ORAL DAILY
COMMUNITY
Start: 2024-09-26

## 2024-10-21 NOTE — PROGRESS NOTES
Injection  Injection performed in right deltoid  by Gerber Santana MA. Patient tolerated the procedure well without complications.  10/21/24   Gerber Santana MA

## 2024-11-04 RX ORDER — SITAGLIPTIN 100 MG/1
100 TABLET, FILM COATED ORAL DAILY
Qty: 90 TABLET | Refills: 0 | Status: SHIPPED | OUTPATIENT
Start: 2024-11-04

## 2024-11-05 NOTE — PROGRESS NOTES
10/21/2024    Assessment & Plan     This pleasant 64-year-old presents today for follow-up of diabetes mellitus type 2 and hypertension.  She relates she is feeling well having had no problems in the interim of visits.  She is currently on metformin 500 mg 2 tablets in the morning 2 tablets in afternoon for diabetes control as well as Januvia 100 mg.  Her hemoglobin A1c today is excellent at 6.7 An improvement over prior 6.9 of 4 months ago.    Blood pressure shows good control initially recorded as 146/89 on recheck by me in the left arm sitting position large cuff was 130/80.  Note is made she is lost 4 pounds from her last visit down to 269 with a BMI of 49.2.    Diagnoses and all orders for this visit:    1. Flu vaccine need (Primary)  -     Fluzone High-Dose 65+yrs ([id number])    2. Type 2 diabetes mellitus with hyperglycemia, without long-term current use of insulin  -     POC Glycosylated Hemoglobin (Hb A1C)    3. Essential hypertension         Plan:  1.)  Continue current medication  2.)  Follow-up in 4 months for diabetes reevaluation         CC: Diabetes and Hypertension (A1c check and bp check )  .        HPI  History of Present Illness     Subjective   Katharine Mitchell is a 64 y.o. female.      The following portions of the patient's history were reviewed and updated as appropriate: allergies, current medications, past family history, past medical history, past social history, past surgical history, and problem list.    Problem List  Patient Active Problem List   Diagnosis    Abnormal EKG    Essential hypertension    Preop cardiovascular exam    Osteoarthritis of right knee    Osteoarthritis of left knee    Type 2 diabetes mellitus    Class 3 obesity due to excess calories in adult    CAROLE (obstructive sleep apnea)    Screening for colon cancer    Polyp of transverse colon    Asthma    Diabetic peripheral neuropathy associated with type 2 diabetes mellitus    Inflammatory and toxic neuropathy     Endometrial hyperplasia with atypia    PMB (postmenopausal bleeding)       Past Medical History  Past Medical History:   Diagnosis Date    Abnormal EKG 01/09/2017    Allergic     Zestril    Arthritis     Asthma August 2020    Cholelithiasis 1986    Gallbladder removed    Diabetes mellitus     TYPE 2    Gout 2021    Hypertension     Knee pain     Neuropathy in diabetes 2021    Obesity     Pinched nerve in neck 06/30/2023    Sleep apnea     DOES NOT WEAR CPAP    UTI (urinary tract infection)     TREATED FOR UTI JAN 2017       Surgical History  Past Surgical History:   Procedure Laterality Date    CHOLECYSTECTOMY      COLONOSCOPY      COLONOSCOPY N/A 01/06/2020    Procedure: COLONOSCOPY TO CECUM WITH COLD BIOPSY POLYPECTOMY AND 2 ML SALINE LIFT INJECTION;  Surgeon: Eva Rizvi MD;  Location: [facility] ENDOSCOPY;  Service: General    GALLBLADDER SURGERY      HYSTERECTOMY  10/04/2023    JOINT REPLACEMENT      KNEE ARTHROSCOPY Left     PILONIDAL CYSTECTOMY      PA ARTHRP KNE CONDYLE&PLATU MEDIAL&LAT COMPARTMENTS Right 02/20/2017    Procedure: RT TOTAL KNEE ARTHROPLASTY;  Surgeon: Lamont Morales MD;  Location: [facility] MAIN OR;  Service: Orthopedics    TOTAL KNEE ARTHROPLASTY Left 05/14/2018    Procedure: LEFT TOTAL KNEE ARTHROPLASTY;  Surgeon: Lamont Morales MD;  Location: [facility] MAIN OR;  Service: Orthopedics       Family History  Family History   Problem Relation Age of Onset    Arthritis Mother     Cancer Mother     Hypertension Mother     Kidney disease Mother     Hypertension Father     Diabetes Paternal Grandmother     Malig Hyperthermia Neg Hx        Social History  Social History    Tobacco Use      Smoking status: Never        Passive exposure: Never      Smokeless tobacco: Never       Is the Patient a current tobacco user? No    Allergies  Allergies   Allergen Reactions    Lisinopril Other (See Comments), Swelling and Provider Review Needed     LIP/ MOUTH SWELLING       Current Medications    Current Outpatient  Medications:     acetaminophen (TYLENOL) 500 MG tablet, Take 1 tablet by mouth Every 6 (Six) Hours As Needed for Mild Pain., Disp: 30 tablet, Rfl: 0    Advair Diskus 100-50 MCG/ACT DISKUS, INHALE 1 PUFF BY MOUTH EVERY 12 HOURS. RINSE MOUTH AFTER USE, Disp: , Rfl:     azelastine (ASTELIN) 0.1 % nasal spray, Administer 1 spray into the nostril(s) as directed by provider Every Evening., Disp: , Rfl:     Blood Glucose Monitoring Suppl (ONE TOUCH ULTRA 2) W/DEVICE kit, , Disp: , Rfl:     furosemide (LASIX) 20 MG tablet, TAKE 1 TABLET BY MOUTH DAILY AS NEEDED, Disp: 20 tablet, Rfl: 3    glucose blood (ONE TOUCH ULTRA TEST) test strip, 1 bid, Disp: 100 each, Rfl: 5    meloxicam (MOBIC) 7.5 MG tablet, Take 1 tablet by mouth Daily., Disp: , Rfl:     metFORMIN (GLUCOPHAGE) 500 MG tablet, TAKE 2 TABLETS BY MOUTH TWICE DAILY WITH FOOD, Disp: 360 tablet, Rfl: 2    Multiple Vitamins-Minerals (MULTIVITAMIN ADULT PO), Take 1 tablet by mouth Every Morning. To stop before surgery, Disp: , Rfl:     NIFEdipine XL (PROCARDIA XL) 60 MG 24 hr tablet, TAKE 1 TABLET BY MOUTH TWICE DAILY, Disp: 180 tablet, Rfl: 2    OneTouch Delica Lancets 33G misc, Test glucose daily, Disp: 100 each, Rfl: 5    rosuvastatin (CRESTOR) 10 MG tablet, TAKE 1 TABLET BY MOUTH EVERY 3 DAYS, Disp: 30 tablet, Rfl: 5    TURMERIC PO, Take  by mouth., Disp: , Rfl:     Unable to find, 1 each 1 (One) Time. Sea dan, Disp: , Rfl:     dicyclomine (BENTYL) 20 MG tablet, Take 1 tablet by mouth Every 8 (Eight) Hours As Needed (Abdominal cramps). (Patient not taking: Reported on 10/21/2024), Disp: 20 tablet, Rfl: 0    Januvia 100 MG tablet, TAKE 1 TABLET BY MOUTH DAILY, Disp: 90 tablet, Rfl: 0    Scopolamine 1 MG/3DAYS patch, Apply patch behind the ear 12 hours before ship departure and change every 3 days (Patient not taking: Reported on 10/21/2024), Disp: 5 patch, Rfl: 0    spironolactone (ALDACTONE) 25 MG tablet, TAKE 2 TABLETS BY MOUTH EVERY MORNING AND 2 TABLET EVERY  EVENING, Disp: 360 tablet, Rfl: 1     Review of System  Review of Systems  I have reviewed and confirmed the accuracy of the ROS as documented by the MA/LPN/RN Poncho Lee MD    Vitals:    10/21/24 1128   BP: 146/89   Pulse: 73   SpO2: 98%     Body mass index is 49.19 kg/m².    Objective     Physical Exam  Physical Exam        Poncho Lee MD  10/21/2024    Answers submitted by the patient for this visit:  Primary Reason for Visit (Submitted on 10/20/2024)  What is the primary reason for your visit?: Diabetes  Diabetes Questionnaire (Submitted on 10/20/2024)  Chief Complaint: Diabetes problem  Below 70: never

## 2024-11-26 ENCOUNTER — OFFICE VISIT (OUTPATIENT)
Dept: FAMILY MEDICINE CLINIC | Facility: CLINIC | Age: 64
End: 2024-11-26
Payer: MEDICARE

## 2024-11-26 VITALS
DIASTOLIC BLOOD PRESSURE: 90 MMHG | HEIGHT: 62 IN | BODY MASS INDEX: 48.58 KG/M2 | SYSTOLIC BLOOD PRESSURE: 140 MMHG | WEIGHT: 264 LBS

## 2024-11-26 DIAGNOSIS — I10 ESSENTIAL HYPERTENSION: ICD-10-CM

## 2024-11-26 DIAGNOSIS — Z00.00 WELCOME TO MEDICARE PREVENTIVE VISIT: Primary | ICD-10-CM

## 2024-11-26 DIAGNOSIS — E11.65 TYPE 2 DIABETES MELLITUS WITH HYPERGLYCEMIA, WITHOUT LONG-TERM CURRENT USE OF INSULIN: ICD-10-CM

## 2024-11-26 RX ORDER — FLUTICASONE PROPIONATE 50 MCG
SPRAY, SUSPENSION (ML) NASAL
COMMUNITY
Start: 2024-11-25

## 2024-11-26 RX ORDER — SPIRONOLACTONE 25 MG/1
TABLET ORAL
Qty: 540 TABLET | Refills: 1 | Status: SHIPPED | OUTPATIENT
Start: 2024-11-26

## 2024-11-26 NOTE — PROGRESS NOTES
Subjective   The ABCs of the Annual Wellness Visit  Medicare Wellness Visit      Katharine Mitchell is a 64 y.o. patient who presents for a Medicare Wellness Visit.    The following portions of the patient's history were reviewed and   updated as appropriate: allergies, current medications, past family history, past medical history, past social history, past surgical history, and problem list.    Compared to one year ago, the patient's physical   health is better.  Compared to one year ago, the patient's mental   health is the same.    Recent Hospitalizations:  She was not admitted to the hospital during the last year.     Current Medical Providers:  Patient Care Team:  Poncho Lee MD as PCP - General (Internal Medicine)  Adrian Potter MD as Consulting Physician (Cardiology)    Outpatient Medications Prior to Visit   Medication Sig Dispense Refill    acetaminophen (TYLENOL) 500 MG tablet Take 1 tablet by mouth Every 6 (Six) Hours As Needed for Mild Pain. 30 tablet 0    Advair Diskus 100-50 MCG/ACT DISKUS INHALE 1 PUFF BY MOUTH EVERY 12 HOURS. RINSE MOUTH AFTER USE      azelastine (ASTELIN) 0.1 % nasal spray Administer 1 spray into the nostril(s) as directed by provider Every Evening.      Blood Glucose Monitoring Suppl (ONE TOUCH ULTRA 2) W/DEVICE kit       fluticasone (FLONASE) 50 MCG/ACT nasal spray       furosemide (LASIX) 20 MG tablet TAKE 1 TABLET BY MOUTH DAILY AS NEEDED 20 tablet 3    glucose blood (ONE TOUCH ULTRA TEST) test strip 1 bid 100 each 5    Januvia 100 MG tablet TAKE 1 TABLET BY MOUTH DAILY 90 tablet 0    meloxicam (MOBIC) 7.5 MG tablet Take 1 tablet by mouth Daily.      metFORMIN (GLUCOPHAGE) 500 MG tablet TAKE 2 TABLETS BY MOUTH TWICE DAILY WITH FOOD 360 tablet 2    Multiple Vitamins-Minerals (MULTIVITAMIN ADULT PO) Take 1 tablet by mouth Every Morning. To stop before surgery      NIFEdipine XL (PROCARDIA XL) 60 MG 24 hr tablet TAKE 1 TABLET BY MOUTH TWICE DAILY 180 tablet 2     OneTouch Delica Lancets 33G misc Test glucose daily 100 each 5    rosuvastatin (CRESTOR) 10 MG tablet TAKE 1 TABLET BY MOUTH EVERY 3 DAYS 30 tablet 5    TURMERIC PO Take  by mouth.      Unable to find 1 each 1 (One) Time. Sea dan      spironolactone (ALDACTONE) 25 MG tablet TAKE 2 TABLETS BY MOUTH EVERY MORNING AND 2 TABLET EVERY EVENING 360 tablet 1    dicyclomine (BENTYL) 20 MG tablet Take 1 tablet by mouth Every 8 (Eight) Hours As Needed (Abdominal cramps). (Patient not taking: Reported on 10/21/2024) 20 tablet 0    Scopolamine 1 MG/3DAYS patch Apply patch behind the ear 12 hours before ship departure and change every 3 days (Patient not taking: Reported on 10/21/2024) 5 patch 0     No facility-administered medications prior to visit.     No opioid medication identified on active medication list. I have reviewed chart for other potential  high risk medication/s and harmful drug interactions in the elderly.      Aspirin is not on active medication list.  Aspirin use is not indicated based on review of current medical condition/s. Risk of harm outweighs potential benefits.  .    Patient Active Problem List   Diagnosis    Abnormal EKG    Essential hypertension    Preop cardiovascular exam    Osteoarthritis of right knee    Osteoarthritis of left knee    Type 2 diabetes mellitus    Class 3 obesity due to excess calories in adult    CAROLE (obstructive sleep apnea)    Screening for colon cancer    Polyp of transverse colon    Asthma    Diabetic peripheral neuropathy associated with type 2 diabetes mellitus    Inflammatory and toxic neuropathy    Endometrial hyperplasia with atypia    PMB (postmenopausal bleeding)     Advance Care Planning Advance Directive is not on file.  ACP discussion was held with the patient during this visit. Patient does not have an advance directive, information provided.            Objective   Vitals:    11/26/24 1024   BP: 140/90   Cuff Size: Large Adult   Weight: 120 kg (264 lb)   Height:  "157.5 cm (62\")   PainSc: 0-No pain       Estimated body mass index is 48.29 kg/m² as calculated from the following:    Height as of this encounter: 157.5 cm (62\").    Weight as of this encounter: 120 kg (264 lb).           Gait and Balance Evaluation:  Normal  Does the patient have evidence of cognitive impairment? No  Lab Results   Component Value Date    HGBA1C 6.7 (A) 10/21/2024                                                                                                Health  Risk Assessment    Smoking Status:  Social History     Tobacco Use   Smoking Status Never    Passive exposure: Never   Smokeless Tobacco Never     Alcohol Consumption:  Social History     Substance and Sexual Activity   Alcohol Use Not Currently       Fall Risk Screen  STEADI Fall Risk Assessment was completed, and patient is at LOW risk for falls.Assessment completed on:2024    Depression Screening   Little interest or pleasure in doing things? Not at all   Feeling down, depressed, or hopeless? Not at all   PHQ-2 Total Score 0      Health Habits and Functional and Cognitive Screenin/25/2024    10:29 AM   Functional & Cognitive Status   Do you have difficulty preparing food and eating? No    Do you have difficulty bathing yourself, getting dressed or grooming yourself? No    Do you have difficulty using the toilet? No    Do you have difficulty moving around from place to place? No    Do you have trouble with steps or getting out of a bed or a chair? No    Current Diet Well Balanced Diet    Dental Exam Up to date    Eye Exam Up to date    Exercise (times per week) 3 times per week    Current Exercises Include Cardiovascular Workout;House Cleaning;Swim Aerobics Class    Do you need help using the phone?  No    Are you deaf or do you have serious difficulty hearing?  No    Do you need help to go to places out of walking distance? No    Do you need help shopping? No    Do you need help preparing meals?  No    Do you need help " with housework?  No    Do you need help with laundry? No    Do you need help taking your medications? No    Do you need help managing money? No    Do you ever drive or ride in a car without wearing a seat belt? No    Have you felt unusual stress, anger or loneliness in the last month? No    Who do you live with? Alone    If you need help, do you have trouble finding someone available to you? No    Have you been bothered in the last four weeks by sexual problems? No    Do you have difficulty concentrating, remembering or making decisions? No        Patient-reported   Visual Acuity:  Vision Screening    Right eye Left eye Both eyes   Without correction      With correction 20/40 20/25 20/25     Age-appropriate Screening Schedule:  Refer to the list below for future screening recommendations based on patient's age, sex and/or medical conditions. Orders for these recommended tests are listed in the plan section. The patient has been provided with a written plan.    Health Maintenance List  Health Maintenance   Topic Date Due    DXA SCAN  Never done    Pneumococcal Vaccine 65+ (1 of 2 - PCV) Never done    URINE MICROALBUMIN  Never done    PAP SMEAR  01/07/2024    COVID-19 Vaccine (6 - 2024-25 season) 09/01/2024    DIABETIC EYE EXAM  12/19/2024    COLORECTAL CANCER SCREENING  01/06/2025    BMI FOLLOWUP  12/28/2024    HEMOGLOBIN A1C  04/21/2025    DIABETIC FOOT EXAM  06/21/2025    LIPID PANEL  06/21/2025    ANNUAL WELLNESS VISIT  11/26/2025    MAMMOGRAM  03/20/2026    TDAP/TD VACCINES (2 - Td or Tdap) 07/14/2031    HEPATITIS C SCREENING  Completed    INFLUENZA VACCINE  Completed    ZOSTER VACCINE  Completed                                                                                                                                                CMS Preventative Services Quick Reference  Risk Factors Identified During Encounter  Urinary Incontinence: Referred to Urology    The above risks/problems have been discussed  "with the patient.  Pertinent information has been shared with the patient in the After Visit Summary.  An After Visit Summary and PPPS were made available to the patient.    Follow Up:   Next Medicare Wellness visit to be scheduled in 1 year.         Additional E&M Note during same encounter follows:  Patient has additional, significant, and separately identifiable condition(s)/problem(s) that require work above and beyond the Medicare Wellness Visit     Chief Complaint  Welcome To Medicare    Subjective   HPI  Katharine is also being seen today for an annual adult preventative physical exam.     Review of Systems   Constitutional:  Negative for chills and fever.   Respiratory:  Negative for cough and shortness of breath.    Cardiovascular:  Negative for chest pain and palpitations.   Gastrointestinal:  Negative for constipation, diarrhea, nausea and vomiting.   Neurological:  Negative for dizziness.              Objective   Vital Signs:  /90 (Cuff Size: Large Adult)   Ht 157.5 cm (62\")   Wt 120 kg (264 lb)   BMI 48.29 kg/m²   Physical Exam  Vitals and nursing note reviewed.   Constitutional:       Appearance: She is well-developed.   HENT:      Head: Normocephalic and atraumatic.   Eyes:      Conjunctiva/sclera: Conjunctivae normal.   Cardiovascular:      Rate and Rhythm: Normal rate and regular rhythm.      Heart sounds: Normal heart sounds.   Pulmonary:      Effort: Pulmonary effort is normal.      Breath sounds: Normal breath sounds.   Abdominal:      General: Bowel sounds are normal.      Palpations: Abdomen is soft.   Musculoskeletal:         General: Normal range of motion.      Cervical back: Normal range of motion and neck supple.   Skin:     General: Skin is warm and dry.   Neurological:      Mental Status: She is alert and oriented to person, place, and time.   Psychiatric:         Behavior: Behavior normal.                 Assessment and Plan     This pleasant 64-year-old presents today for " welcome to Medicare evaluation.  She is in good spirits today relates she is having no chest pain or shortness of breath.  She is ambulating without difficulty.  Her BMI is elevated at 48.3 but this is actually down 5 pounds from 1 month ago.  She has a prior history of hypertension and we note that her blood pressure was 140/90 in the left arm sitting position standard cuff.  On recheck by me a blood pressure was 146/86 in the left arm sitting position large cuff.  She is currently on Aldactone 25 mg 2 tablets twice daily we will increase that to 2 mg 3 times daily noting her increased BMI of 48.3.    Regarding anticipatory guidance.  We discussed the importance of keeping her LDL cholesterol less than 100 and the importance of regular physical exercise.  Would like to see her to get 30 minutes of exercise daily for a total of 150 minutes/week.    We gave her a low-cholesterol diet sheet for implementation at our direction.    Her previous LDL done on 6/21/2024 showed an LDL of 82.    Recent comprehensive metabolic panel showed a GFR of 54.4 and a potassium level of 4.9.        The 10-year ASCVD risk score (Leidy RODRIGUEZ, et al., 2019) is: 20.7%    Values used to calculate the score:      Age: 64 years      Sex: Female      Is Non- : Yes      Diabetic: Yes      Tobacco smoker: No      Systolic Blood Pressure: 140 mmHg      Is BP treated: Yes      HDL Cholesterol: 50 mg/dL      Total Cholesterol: 156 mg/dL     With her elevated ASCVD score will increase her Crestor to 20 mg p.o. every morning.  She has demonstrated some insensitivity in the past with myalgia to higher doses of statins.  Will also increase her Aldactone to 25 mg 2 tablets 3 times daily.                  Welcome to Medicare preventive visit    Essential hypertension    Type 2 diabetes mellitus with hyperglycemia, without long-term current use of insulin      Orders Placed This Encounter   Procedures    Ambulatory Referral For  Screening Colonoscopy     Referral Priority:   Routine     Referral Type:   Diagnostic Medical     Referral Reason:   Specialty Services Required     Referred to Provider:   Jose A Jacques MD     Number of Visits Requested:   1    ECG 12 Lead     Order Specific Question:   Reason for Exam:     Answer:   welcome to medicare     Order Specific Question:   Release to patient     Answer:   Routine Release [[id number]]             Follow Up   No follow-ups on file.  Patient was given instructions and counseling regarding her condition or for health maintenance advice. Please see specific information pulled into the AVS if appropriate.

## 2024-11-27 ENCOUNTER — PATIENT ROUNDING (BHMG ONLY) (OUTPATIENT)
Dept: FAMILY MEDICINE CLINIC | Facility: CLINIC | Age: 64
End: 2024-11-27
Payer: MEDICARE

## 2024-11-27 NOTE — PROGRESS NOTES
A [employer] message has been sent to patient rounding with [facility] [place] [facility].

## 2024-12-01 NOTE — PROGRESS NOTES
Call patient to notify of results    EKG done as part of welcome to Medicare shows a normal EKG with no evidence of ischemic changes.

## 2024-12-17 ENCOUNTER — OFFICE VISIT (OUTPATIENT)
Dept: FAMILY MEDICINE CLINIC | Facility: CLINIC | Age: 64
End: 2024-12-17
Payer: MEDICARE

## 2024-12-17 VITALS
BODY MASS INDEX: 49.69 KG/M2 | WEIGHT: 270 LBS | HEIGHT: 62 IN | DIASTOLIC BLOOD PRESSURE: 90 MMHG | SYSTOLIC BLOOD PRESSURE: 150 MMHG

## 2024-12-17 DIAGNOSIS — E11.65 TYPE 2 DIABETES MELLITUS WITH HYPERGLYCEMIA, WITHOUT LONG-TERM CURRENT USE OF INSULIN: ICD-10-CM

## 2024-12-17 DIAGNOSIS — I10 ESSENTIAL HYPERTENSION: Primary | ICD-10-CM

## 2024-12-17 PROCEDURE — 3077F SYST BP >= 140 MM HG: CPT | Performed by: INTERNAL MEDICINE

## 2024-12-17 PROCEDURE — 1159F MED LIST DOCD IN RCRD: CPT | Performed by: INTERNAL MEDICINE

## 2024-12-17 PROCEDURE — 3044F HG A1C LEVEL LT 7.0%: CPT | Performed by: INTERNAL MEDICINE

## 2024-12-17 PROCEDURE — 1126F AMNT PAIN NOTED NONE PRSNT: CPT | Performed by: INTERNAL MEDICINE

## 2024-12-17 PROCEDURE — 1160F RVW MEDS BY RX/DR IN RCRD: CPT | Performed by: INTERNAL MEDICINE

## 2024-12-17 PROCEDURE — 99213 OFFICE O/P EST LOW 20 MIN: CPT | Performed by: INTERNAL MEDICINE

## 2024-12-17 PROCEDURE — 3080F DIAST BP >= 90 MM HG: CPT | Performed by: INTERNAL MEDICINE

## 2024-12-17 RX ORDER — HYDROCHLOROTHIAZIDE 25 MG/1
25 TABLET ORAL DAILY
Qty: 30 TABLET | Refills: 1 | Status: SHIPPED | OUTPATIENT
Start: 2024-12-17

## 2024-12-25 NOTE — PROGRESS NOTES
12/17/2024    Assessment & Plan     This pleasant 64-year-old presents at this time for follow-up of hypertension.  She relates she is taking her blood pressure medication as prescribed she is currently on Procardia 60 mg p.o. twice daily Aldactone 50 mg p.o. 3 times daily.  Her blood pressure was initially elevated at 150/90 in the left arm sitting position large cuff.  Note is made she had been on clonidine in the past but was intolerant of this.  Weight is up 6 pounds from her last visit 1 month ago to 270 pounds her BMI is 49.4.  We discussed with her the importance of a low-sodium diet.    Will DC Lasix for now and give her instead HCTZ 50 mg 1 tab p.o. every morning and we will see her back back again in follow-up in 1 week.  Diagnoses and all orders for this visit:    1. Essential hypertension (Primary)    2. Type 2 diabetes mellitus with hyperglycemia, without long-term current use of insulin    Other orders  -     hydroCHLOROthiazide 25 MG tablet; Take 1 tablet by mouth Daily.  Dispense: 30 tablet; Refill: 1           Plan:  1.)  Add HCTZ 50 mg 1 tab p.o. every morning to current regimen  2.)  DC furosemide 20 mg p.o. daily       CC: Hypertension (F/U---no other issues)  .        HPI  Hypertension  Pertinent negatives include no blurred vision, chest pain, palpitations or shortness of breath.        Subjective   Katharine Mitchell is a 64 y.o. female.      The following portions of the patient's history were reviewed and updated as appropriate: allergies, current medications, past family history, past medical history, past social history, past surgical history, and problem list.    Problem List  Patient Active Problem List   Diagnosis    Abnormal EKG    Essential hypertension    Preop cardiovascular exam    Osteoarthritis of right knee    Osteoarthritis of left knee    Type 2 diabetes mellitus    Class 3 obesity due to excess calories in adult    CAROLE (obstructive sleep apnea)    Screening for colon cancer     Polyp of transverse colon    Asthma    Diabetic peripheral neuropathy associated with type 2 diabetes mellitus    Inflammatory and toxic neuropathy    Endometrial hyperplasia with atypia    PMB (postmenopausal bleeding)       Past Medical History  Past Medical History:   Diagnosis Date    Abnormal EKG 01/09/2017    Allergic     Zestril    Arthritis     Asthma August 2020    Cholelithiasis 1986    Gallbladder removed    Diabetes mellitus     TYPE 2    Gout 2021    Hypertension     Knee pain     Neuropathy in diabetes 2021    Obesity     Pinched nerve in neck 06/30/2023    Sleep apnea     DOES NOT WEAR CPAP    UTI (urinary tract infection)     TREATED FOR UTI JAN 2017       Surgical History  Past Surgical History:   Procedure Laterality Date    CHOLECYSTECTOMY      COLONOSCOPY      COLONOSCOPY N/A 01/06/2020    Procedure: COLONOSCOPY TO CECUM WITH COLD BIOPSY POLYPECTOMY AND 2 ML SALINE LIFT INJECTION;  Surgeon: Eva Rizvi MD;  Location: [facility] ENDOSCOPY;  Service: General    GALLBLADDER SURGERY      HYSTERECTOMY  10/04/2023    JOINT REPLACEMENT      KNEE ARTHROSCOPY Left     PILONIDAL CYSTECTOMY      CO ARTHRP KNE CONDYLE&PLATU MEDIAL&LAT COMPARTMENTS Right 02/20/2017    Procedure: RT TOTAL KNEE ARTHROPLASTY;  Surgeon: Lamont Morales MD;  Location: [facility] MAIN OR;  Service: Orthopedics    TOTAL KNEE ARTHROPLASTY Left 05/14/2018    Procedure: LEFT TOTAL KNEE ARTHROPLASTY;  Surgeon: Lamont Morales MD;  Location: [facility] MAIN OR;  Service: Orthopedics       Family History  Family History   Problem Relation Age of Onset    Arthritis Mother     Cancer Mother     Hypertension Mother     Kidney disease Mother     Hypertension Father     Diabetes Paternal Grandmother     Malig Hyperthermia Neg Hx        Social History  Social History    Tobacco Use      Smoking status: Never        Passive exposure: Never      Smokeless tobacco: Never       Is the Patient a current tobacco user? No    Allergies  Allergies   Allergen  Reactions    Lisinopril Other (See Comments), Swelling and Provider Review Needed     LIP/ MOUTH SWELLING       Current Medications    Current Outpatient Medications:     acetaminophen (TYLENOL) 500 MG tablet, Take 1 tablet by mouth Every 6 (Six) Hours As Needed for Mild Pain., Disp: 30 tablet, Rfl: 0    Advair Diskus 100-50 MCG/ACT DISKUS, INHALE 1 PUFF BY MOUTH EVERY 12 HOURS. RINSE MOUTH AFTER USE, Disp: , Rfl:     azelastine (ASTELIN) 0.1 % nasal spray, Administer 1 spray into the nostril(s) as directed by provider Every Evening., Disp: , Rfl:     Blood Glucose Monitoring Suppl (ONE TOUCH ULTRA 2) W/DEVICE kit, , Disp: , Rfl:     fluticasone (FLONASE) 50 MCG/ACT nasal spray, , Disp: , Rfl:     furosemide (LASIX) 20 MG tablet, TAKE 1 TABLET BY MOUTH DAILY AS NEEDED, Disp: 20 tablet, Rfl: 3    glucose blood (ONE TOUCH ULTRA TEST) test strip, 1 bid, Disp: 100 each, Rfl: 5    Januvia 100 MG tablet, TAKE 1 TABLET BY MOUTH DAILY, Disp: 90 tablet, Rfl: 0    meloxicam (MOBIC) 7.5 MG tablet, Take 1 tablet by mouth Daily., Disp: , Rfl:     metFORMIN (GLUCOPHAGE) 500 MG tablet, TAKE 2 TABLETS BY MOUTH TWICE DAILY WITH FOOD, Disp: 360 tablet, Rfl: 2    Multiple Vitamins-Minerals (MULTIVITAMIN ADULT PO), Take 1 tablet by mouth Every Morning. To stop before surgery, Disp: , Rfl:     NIFEdipine XL (PROCARDIA XL) 60 MG 24 hr tablet, TAKE 1 TABLET BY MOUTH TWICE DAILY, Disp: 180 tablet, Rfl: 2    OneTouch Delica Lancets 33G misc, Test glucose daily, Disp: 100 each, Rfl: 5    rosuvastatin (CRESTOR) 10 MG tablet, TAKE 1 TABLET BY MOUTH EVERY 3 DAYS, Disp: 30 tablet, Rfl: 5    spironolactone (ALDACTONE) 25 MG tablet, TAKE 2 TABLETS BY MOUTH EVERY MORNING AND 2 TABLET EVERY EVENING and 2 at bedtime, Disp: 540 tablet, Rfl: 1    TURMERIC PO, Take  by mouth., Disp: , Rfl:     Unable to find, 1 each 1 (One) Time. Sea [employer], Disp: , Rfl:     hydroCHLOROthiazide 25 MG tablet, Take 1 tablet by mouth Daily., Disp: 30 tablet, Rfl: 1      Review of System  Review of Systems   Eyes:  Negative for blurred vision.   Respiratory:  Negative for shortness of breath.    Cardiovascular:  Negative for chest pain and palpitations.     I have reviewed and confirmed the accuracy of the ROS as documented by the MA/LPN/RN Poncho Lee MD    Vitals:    12/17/24 1116   BP: 150/90     Body mass index is 49.38 kg/m².    Objective     Physical Exam  Physical Exam  Constitutional:       General: She is not in acute distress.     Appearance: Normal appearance.   HENT:      Head: Normocephalic and atraumatic.   Cardiovascular:      Rate and Rhythm: Normal rate and regular rhythm.   Pulmonary:      Effort: Pulmonary effort is normal. No respiratory distress.      Breath sounds: Normal breath sounds. No wheezing, rhonchi or rales.   Neurological:      General: No focal deficit present.      Mental Status: She is alert and oriented to person, place, and time.   Psychiatric:         Mood and Affect: Mood normal.         Behavior: Behavior normal.         Thought Content: Thought content normal.         Judgment: Judgment normal.             Poncho Lee MD  12/17/2024  Answers submitted by the patient for this visit:  Primary Reason for Visit (Submitted on 12/10/2024)  What is the primary reason for your visit?: High Blood Pressure

## 2024-12-26 ENCOUNTER — OFFICE VISIT (OUTPATIENT)
Dept: FAMILY MEDICINE CLINIC | Facility: CLINIC | Age: 64
End: 2024-12-26
Payer: MEDICARE

## 2024-12-26 VITALS
SYSTOLIC BLOOD PRESSURE: 150 MMHG | BODY MASS INDEX: 48.95 KG/M2 | WEIGHT: 266 LBS | HEIGHT: 62 IN | DIASTOLIC BLOOD PRESSURE: 100 MMHG

## 2024-12-26 DIAGNOSIS — I10 ESSENTIAL HYPERTENSION: Primary | ICD-10-CM

## 2024-12-26 DIAGNOSIS — E78.5 HYPERLIPIDEMIA, UNSPECIFIED HYPERLIPIDEMIA TYPE: ICD-10-CM

## 2024-12-26 PROCEDURE — 3080F DIAST BP >= 90 MM HG: CPT | Performed by: INTERNAL MEDICINE

## 2024-12-26 PROCEDURE — 1159F MED LIST DOCD IN RCRD: CPT | Performed by: INTERNAL MEDICINE

## 2024-12-26 PROCEDURE — 1126F AMNT PAIN NOTED NONE PRSNT: CPT | Performed by: INTERNAL MEDICINE

## 2024-12-26 PROCEDURE — 99213 OFFICE O/P EST LOW 20 MIN: CPT | Performed by: INTERNAL MEDICINE

## 2024-12-26 PROCEDURE — 3044F HG A1C LEVEL LT 7.0%: CPT | Performed by: INTERNAL MEDICINE

## 2024-12-26 PROCEDURE — 1160F RVW MEDS BY RX/DR IN RCRD: CPT | Performed by: INTERNAL MEDICINE

## 2024-12-26 PROCEDURE — 3077F SYST BP >= 140 MM HG: CPT | Performed by: INTERNAL MEDICINE

## 2024-12-26 NOTE — PROGRESS NOTES
12/26/2024    Assessment & Plan   His blood 64-year-old presents at this time for follow-up of hypertension with a last visit approximately 10 days ago we added HCTZ to her hypertensive regimen.  She relates that since then she has had no chest pain or shortness of breath and she denies any increased urination.  Her blood pressure last week was 150/90 today was initially 150/100 but on recheck by me in the left arm sitting position standard cuff was 144/80.  She relates she took her medication today.  Her weight is 266 pounds a loss of 4 pounds from her last visit.  Her BMI is 48.6.        She is currently on HCTZ 25 mg, nifedipine 60 mg 1 tab p.o. twice daily, spironolactone 25 mg 2 tablets in the morning 2 tablets every evening and 2 at bedtime.  We discussed with the importance of keep maintaining a low-sodium diet.    We note that she is retired.      Diagnoses and all orders for this visit:    1. Essential hypertension (Primary)  -     Comprehensive metabolic panel; Future    2. Hyperlipidemia, unspecified hyperlipidemia type  -     Lipid Panel         Plan:  1.)  Continue current regimen  2.)  Follow-up in 3 to 4 weeks for reevaluation of blood pressure         CC: Hypertension (F/U)  .        HPI  Hypertension  Pertinent negatives include no chest pain, palpitations or shortness of breath.        Subjective   Katharine Mitchell is a 64 y.o. female.      The following portions of the patient's history were reviewed and updated as appropriate: allergies, current medications, past family history, past medical history, past social history, past surgical history, and problem list.    Problem List  Patient Active Problem List   Diagnosis    Abnormal EKG    Essential hypertension    Preop cardiovascular exam    Osteoarthritis of right knee    Osteoarthritis of left knee    Type 2 diabetes mellitus    Class 3 obesity due to excess calories in adult    CAROLE (obstructive sleep apnea)    Screening for colon cancer     Polyp of transverse colon    Asthma    Diabetic peripheral neuropathy associated with type 2 diabetes mellitus    Inflammatory and toxic neuropathy    Endometrial hyperplasia with atypia    PMB (postmenopausal bleeding)       Past Medical History  Past Medical History:   Diagnosis Date    Abnormal EKG 01/09/2017    Allergic     Zestril    Arthritis     Asthma August 2020    Cholelithiasis 1986    Gallbladder removed    Diabetes mellitus     TYPE 2    Gout 2021    Hypertension     Knee pain     Neuropathy in diabetes 2021    Obesity     Pinched nerve in neck 06/30/2023    Sleep apnea     DOES NOT WEAR CPAP    UTI (urinary tract infection)     TREATED FOR UTI JAN 2017       Surgical History  Past Surgical History:   Procedure Laterality Date    CHOLECYSTECTOMY      COLONOSCOPY      COLONOSCOPY N/A 01/06/2020    Procedure: COLONOSCOPY TO CECUM WITH COLD BIOPSY POLYPECTOMY AND 2 ML SALINE LIFT INJECTION;  Surgeon: vEa Rizvi MD;  Location: [facility] ENDOSCOPY;  Service: General    GALLBLADDER SURGERY      HYSTERECTOMY  10/04/2023    JOINT REPLACEMENT      KNEE ARTHROSCOPY Left     PILONIDAL CYSTECTOMY      [place] ARTHRP KNE CONDYLE&PLATU MEDIAL&LAT COMPARTMENTS Right 02/20/2017    Procedure: RT TOTAL KNEE ARTHROPLASTY;  Surgeon: Lamont Morales MD;  Location: [facility] MAIN OR;  Service: Orthopedics    TOTAL KNEE ARTHROPLASTY Left 05/14/2018    Procedure: LEFT TOTAL KNEE ARTHROPLASTY;  Surgeon: Lamont Morales MD;  Location: [facility] MAIN OR;  Service: Orthopedics       Family History  Family History   Problem Relation Age of Onset    Arthritis Mother     Cancer Mother     Hypertension Mother     Kidney disease Mother     Hypertension Father     Diabetes Paternal Grandmother     Malig Hyperthermia Neg Hx        Social History  Social History    Tobacco Use      Smoking status: Never        Passive exposure: Never      Smokeless tobacco: Never       Is the Patient a current tobacco user? No    Allergies  Allergies   Allergen  Reactions    Lisinopril Other (See Comments), Swelling and Provider Review Needed     LIP/ MOUTH SWELLING       Current Medications    Current Outpatient Medications:     acetaminophen (TYLENOL) 500 MG tablet, Take 1 tablet by mouth Every 6 (Six) Hours As Needed for Mild Pain., Disp: 30 tablet, Rfl: 0    Advair Diskus 100-50 MCG/ACT DISKUS, INHALE 1 PUFF BY MOUTH EVERY 12 HOURS. RINSE MOUTH AFTER USE, Disp: , Rfl:     azelastine (ASTELIN) 0.1 % nasal spray, Administer 1 spray into the nostril(s) as directed by provider Every Evening., Disp: , Rfl:     Blood Glucose Monitoring Suppl (ONE TOUCH ULTRA 2) W/DEVICE kit, , Disp: , Rfl:     fluticasone (FLONASE) 50 MCG/ACT nasal spray, , Disp: , Rfl:     glucose blood (ONE TOUCH ULTRA TEST) test strip, 1 bid, Disp: 100 each, Rfl: 5    hydroCHLOROthiazide 25 MG tablet, Take 1 tablet by mouth Daily., Disp: 30 tablet, Rfl: 1    Januvia 100 MG tablet, TAKE 1 TABLET BY MOUTH DAILY, Disp: 90 tablet, Rfl: 0    meloxicam (MOBIC) 7.5 MG tablet, Take 1 tablet by mouth Daily., Disp: , Rfl:     metFORMIN (GLUCOPHAGE) 500 MG tablet, TAKE 2 TABLETS BY MOUTH TWICE DAILY WITH FOOD, Disp: 360 tablet, Rfl: 2    Multiple Vitamins-Minerals (MULTIVITAMIN ADULT PO), Take 1 tablet by mouth Every Morning. To stop before surgery, Disp: , Rfl:     NIFEdipine XL (PROCARDIA XL) 60 MG 24 hr tablet, TAKE 1 TABLET BY MOUTH TWICE DAILY, Disp: 180 tablet, Rfl: 2    OneTouch Delica Lancets 33G misc, Test glucose daily, Disp: 100 each, Rfl: 5    rosuvastatin (CRESTOR) 10 MG tablet, TAKE 1 TABLET BY MOUTH EVERY 3 DAYS, Disp: 30 tablet, Rfl: 5    spironolactone (ALDACTONE) 25 MG tablet, TAKE 2 TABLETS BY MOUTH EVERY MORNING AND 2 TABLET EVERY EVENING and 2 at bedtime, Disp: 540 tablet, Rfl: 1    TURMERIC PO, Take  by mouth., Disp: , Rfl:     Unable to find, 1 each 1 (One) Time. Sea dan, Disp: , Rfl:     furosemide (LASIX) 20 MG tablet, TAKE 1 TABLET BY MOUTH DAILY AS NEEDED (Patient not taking: Reported on  12/26/2024), Disp: 20 tablet, Rfl: 3     Review of System  Review of Systems   Constitutional:  Negative for chills and fever.   Respiratory:  Negative for cough and shortness of breath.    Cardiovascular:  Negative for chest pain and palpitations.   Gastrointestinal:  Negative for constipation, diarrhea, nausea and vomiting.   Neurological:  Negative for dizziness and headache.     I have reviewed and confirmed the accuracy of the ROS as documented by the MA/LPN/RN Poncho Lee MD    Vitals:    12/26/24 1133   BP: 150/100     Body mass index is 48.65 kg/m².    Objective     Physical Exam  Physical Exam  Constitutional:       General: She is not in acute distress.     Appearance: Normal appearance.   HENT:      Head: Normocephalic and atraumatic.   Cardiovascular:      Rate and Rhythm: Normal rate and regular rhythm.   Pulmonary:      Effort: Pulmonary effort is normal. No respiratory distress.      Breath sounds: Normal breath sounds. No wheezing, rhonchi or rales.   Neurological:      General: No focal deficit present.      Mental Status: She is alert and oriented to person, place, and time.   Psychiatric:         Mood and Affect: Mood normal.         Behavior: Behavior normal.         Thought Content: Thought content normal.         Judgment: Judgment normal.             Poncho Lee MD  12/26/2024

## 2025-02-10 RX ORDER — HYDROCHLOROTHIAZIDE 25 MG/1
25 TABLET ORAL DAILY
Qty: 30 TABLET | Refills: 1 | Status: SHIPPED | OUTPATIENT
Start: 2025-02-10

## 2025-02-15 DIAGNOSIS — E11.65 TYPE 2 DIABETES MELLITUS WITH HYPERGLYCEMIA, WITHOUT LONG-TERM CURRENT USE OF INSULIN: ICD-10-CM

## 2025-02-17 RX ORDER — SITAGLIPTIN 100 MG/1
100 TABLET, FILM COATED ORAL DAILY
Qty: 90 TABLET | Refills: 0 | Status: SHIPPED | OUTPATIENT
Start: 2025-02-17

## 2025-02-18 ENCOUNTER — TELEPHONE (OUTPATIENT)
Dept: FAMILY MEDICINE CLINIC | Facility: CLINIC | Age: 65
End: 2025-02-18
Payer: MEDICARE

## 2025-02-21 ENCOUNTER — PREP FOR SURGERY (OUTPATIENT)
Dept: OTHER | Facility: HOSPITAL | Age: 65
End: 2025-02-21
Payer: MEDICARE

## 2025-02-21 DIAGNOSIS — Z86.0101 HISTORY OF ADENOMATOUS POLYP OF COLON: ICD-10-CM

## 2025-02-21 DIAGNOSIS — Z12.11 SCREENING FOR COLON CANCER: Primary | ICD-10-CM

## 2025-03-14 RX ORDER — ROSUVASTATIN CALCIUM 10 MG/1
10 TABLET, COATED ORAL
Qty: 30 TABLET | Refills: 5 | Status: SHIPPED | OUTPATIENT
Start: 2025-03-14

## 2025-03-20 ENCOUNTER — RESULTS FOLLOW-UP (OUTPATIENT)
Dept: FAMILY MEDICINE CLINIC | Facility: CLINIC | Age: 65
End: 2025-03-20
Payer: MEDICARE

## 2025-03-20 NOTE — LETTER
Katharine WILLIAM Stephen  [address] [facility] [address] [facility] [address]    March 21, 2025     Dear Ms. Mitchell:    Below are the results from your recent visit:    Resulted Orders   Comprehensive metabolic panel   Result Value Ref Range    Glucose 134 (H) 65 - 99 mg/dL    BUN 23 8 - 23 mg/dL    Creatinine 1.21 (H) 0.57 - 1.00 mg/dL    EGFR Result 49.8 (L) >60.0 mL/min/1.73      Comment:      GFR Categories in Chronic Kidney Disease (CKD)/X09/  /X09/  GFR Category          GFR (mL/min/1.73)    Interpretation  G1/X09/                    90 or greater/X09/        Normal or high  (1)  G2//                    60-89                Mild decrease  (1)  G3a                   45-59                Mild to moderate  decrease  G3b                   30-44                Moderate to  severe decrease  G4                    15-29                Severe decrease  G5                    14 or less           Kidney failure//  /[id number]/  (1)In the absence of evidence of kidney disease, neither  GFR category G1 or G2 fulfill the criteria for CKD.  eGFR calculation 2021 CKD-EPI creatinine equation, which  does not include race as a factor      BUN/Creatinine Ratio 19.0 7.0 - 25.0    Sodium 140 136 - 145 mmol/L    Potassium 4.5 3.5 - 5.2 mmol/L    Chloride 102 98 - 107 mmol/L    Total CO2 27.0 22.0 - 29.0 mmol/L    Calcium 9.8 8.6 - 10.5 mg/dL    Total Protein 7.1 6.0 - 8.5 g/dL    Albumin 4.2 3.5 - 5.2 g/dL    Globulin 2.9 gm/dL    A/G Ratio 1.4 g/dL    Total Bilirubin 0.4 0.0 - 1.2 mg/dL    Alkaline Phosphatase 86 39 - 117 U/L    AST (SGOT) 18 1 - 32 U/L    ALT (SGPT) 13 1 - 33 U/L   Lipid Panel   Result Value Ref Range    Total Cholesterol 143 0 - 200 mg/dL      Comment:      Cholesterol Reference Ranges  (U.S. Department of Health and Human Services ATP III  Classifications)  Desirable          <200 mg/dL  Borderline High    200-239 mg/dL  High Risk          >240 mg/dL  Triglyceride Reference Ranges  (U.S. Department of Health and Human  Services ATP III  Classifications)  Normal           <150 mg/dL  Borderline High  150-199 mg/dL  High             200-499 mg/dL  Very High        >500 mg/dL  HDL Reference Ranges  (U.S. Department of Health and Human Services ATP III  Classifications)  Low     <40 mg/dl (major risk factor for CHD)  High    >60 mg/dl ('negative' risk factor for CHD)  LDL Reference Ranges  (U.S. Department of Health and Human Services ATP III  Classifications)  Optimal          <100 mg/dL  Near Optimal     100-129 mg/dL  Borderline High  130-159 mg/dL  High             160-189 mg/dL  Very High        >189 mg/dL  LDL is calculated using the NIH LDL-C calculation.      Triglycerides 130 0 - 150 mg/dL    HDL Cholesterol 44 40 - 60 mg/dL    VLDL Cholesterol Marcelino 23 5 - 40 mg/dL    LDL Chol Calc (NIH) 76 0 - 100 mg/dL       Her comprehensive metabolic profile shows that her renal profile is essentially unchanged from the past. Her lipid profile remains looking very good with an LDL of only 76.     If you have any questions or concerns, please don't hesitate to call.         Sincerely,        Poncho Lee MD

## 2025-03-21 NOTE — PROGRESS NOTES
Call patient to notify of results    Her comprehensive metabolic profile shows that her renal profile is essentially unchanged from the past.  Her lipid profile remains looking very good with an LDL of only 76.

## 2025-03-26 ENCOUNTER — OFFICE VISIT (OUTPATIENT)
Dept: FAMILY MEDICINE CLINIC | Facility: CLINIC | Age: 65
End: 2025-03-26
Payer: MEDICARE

## 2025-03-26 VITALS
HEIGHT: 62 IN | WEIGHT: 266 LBS | HEART RATE: 95 BPM | DIASTOLIC BLOOD PRESSURE: 70 MMHG | OXYGEN SATURATION: 97 % | BODY MASS INDEX: 48.95 KG/M2 | SYSTOLIC BLOOD PRESSURE: 130 MMHG

## 2025-03-26 DIAGNOSIS — I10 ESSENTIAL HYPERTENSION: Primary | ICD-10-CM

## 2025-03-26 DIAGNOSIS — E11.65 TYPE 2 DIABETES MELLITUS WITH HYPERGLYCEMIA, WITHOUT LONG-TERM CURRENT USE OF INSULIN: ICD-10-CM

## 2025-03-26 LAB
EXPIRATION DATE: ABNORMAL
HBA1C MFR BLD: 6.9 % (ref 4.5–5.7)
Lab: ABNORMAL

## 2025-03-26 RX ORDER — CETIRIZINE HYDROCHLORIDE 10 MG/1
10 TABLET ORAL DAILY
COMMUNITY

## 2025-03-26 RX ORDER — TROSPIUM CHLORIDE ER 60 MG/1
60 CAPSULE ORAL DAILY
COMMUNITY

## 2025-03-26 NOTE — PROGRESS NOTES
03/26/2025    My Summary of Visit     This pleasant 64-year-old presents at this time for follow-up of diabetes mellitus type 2 and hypertension.  Her previous hemoglobin A1c done 5 months ago was 6.7.  Her previous A1c before that was 6.9.    Her hemoglobin A1c today is 6.9.  Her blood pressure shows good control today 130/70 in the left arm sitting position LARGE cuff.  She relates she is taking her medication as prescribed.    Assessment & Plan  1. Diabetes Mellitus.  Her diabetes is well-managed, with an A1c level of 6.9, which is consistent with the previous reading and remains below the target of 7. She is advised to continue her current diabetes management plan, including medication adherence and dietary control.    2. Hypertension.  Her blood pressure is well-controlled at 130/70, which is an improvement from the last visit. She is currently on HCTZ 25 mg, nifedipine 60 mg, and spironolactone 50 mg twice a day. She should continue her current antihypertensive regimen.    Results  Laboratory Studies  A1c is 6.9.    Class 3 Severe Obesity (BMI >=40). Obesity-related health conditions include the following: hypertension. Obesity is improving with treatment. BMI is is above average; BMI management plan is completed. We discussed portion control and increasing exercise.           CC: Diabetes (F/U---no other issues---unable to hear bp)  .        HPI  Diabetes  Pertinent negatives for hypoglycemia include no confusion, speech difficulty or tremors. Pertinent negatives for diabetes include no blurred vision, no polydipsia, no polyuria and no weight loss.      History of Present Illness  The patient presents for evaluation of diabetes and hypertension.    She has been managing her diabetes effectively, with her last recorded A1c level being 6.7. Her goal is to maintain it below 7.    She has been compliant with her antihypertensive medication regimen, which includes HCTZ 25 mg, nifedipine 60 mg, and spironolactone  50 mg administered twice daily.    SOCIAL HISTORY  She has been retired for 6 years.    MEDICATIONS  HCTZ 25 mg, nifedipine 60 mg, spironolactone 50 mg twice a day    Subjective   Katharine Mitchell is a 64 y.o. female.      The following portions of the patient's history were reviewed and updated as appropriate: allergies, current medications, past family history, past medical history, past social history, past surgical history, and problem list.    Problem List  Patient Active Problem List   Diagnosis    Abnormal EKG    Essential hypertension    Preop cardiovascular exam    Osteoarthritis of right knee    Osteoarthritis of left knee    Type 2 diabetes mellitus    Class 3 obesity due to excess calories in adult    CAROLE (obstructive sleep apnea)    Screening for colon cancer    Polyp of transverse colon    Asthma    Diabetic peripheral neuropathy associated with type 2 diabetes mellitus    Inflammatory and toxic neuropathy    Endometrial hyperplasia with atypia    PMB (postmenopausal bleeding)    History of adenomatous polyp of colon       Past Medical History  Past Medical History:   Diagnosis Date    Abnormal EKG 01/09/2017    Allergic     Zestril    Arthritis     Asthma August 2020    Cholelithiasis 1986    Gallbladder removed    Diabetes mellitus     TYPE 2    Gout 2021    Hypertension     Knee pain     Neuropathy in diabetes 2021    Obesity     Pinched nerve in neck 06/30/2023    Sleep apnea     DOES NOT WEAR CPAP    UTI (urinary tract infection)     TREATED FOR UTI JAN 2017       Surgical History  Past Surgical History:   Procedure Laterality Date    CHOLECYSTECTOMY      COLONOSCOPY      COLONOSCOPY N/A 01/06/2020    Procedure: COLONOSCOPY TO CECUM WITH COLD BIOPSY POLYPECTOMY AND 2 ML SALINE LIFT INJECTION;  Surgeon: Eva Rizvi MD;  Location: [facility] ENDOSCOPY;  Service: General    GALLBLADDER SURGERY      HYSTERECTOMY  10/04/2023    JOINT REPLACEMENT      KNEE ARTHROSCOPY Left     PILONIDAL  CYSTECTOMY      [place] ARTHRP KNE CONDYLE&PLATU MEDIAL&LAT COMPARTMENTS Right 02/20/2017    Procedure: RT TOTAL KNEE ARTHROPLASTY;  Surgeon: Lamont Morales MD;  Location: [facility] MAIN OR;  Service: Orthopedics    TOTAL KNEE ARTHROPLASTY Left 05/14/2018    Procedure: LEFT TOTAL KNEE ARTHROPLASTY;  Surgeon: Lamont Morales MD;  Location: [facility] MAIN OR;  Service: Orthopedics       Family History  Family History   Problem Relation Age of Onset    Arthritis Mother     Cancer Mother     Hypertension Mother     Kidney disease Mother     Hypertension Father     Stroke Father     Diabetes Paternal Grandmother     Malig Hyperthermia Neg Hx        Social History  Social History    Tobacco Use      Smoking status: Never        Passive exposure: Never      Smokeless tobacco: Never       Is the Patient a current tobacco user? No    Allergies  Allergies   Allergen Reactions    Lisinopril Other (See Comments), Swelling and Provider Review Needed     LIP/ MOUTH SWELLING       Current Medications    Current Outpatient Medications:     acetaminophen (TYLENOL) 500 MG tablet, Take 1 tablet by mouth Every 6 (Six) Hours As Needed for Mild Pain., Disp: 30 tablet, Rfl: 0    Advair Diskus 100-50 MCG/ACT DISKUS, INHALE 1 PUFF BY MOUTH EVERY 12 HOURS. RINSE MOUTH AFTER USE, Disp: , Rfl:     azelastine (ASTELIN) 0.1 % nasal spray, Administer 1 spray into the nostril(s) as directed by provider Every Evening., Disp: , Rfl:     Blood Glucose Monitoring Suppl (ONE TOUCH ULTRA 2) W/DEVICE kit, , Disp: , Rfl:     cetirizine (zyrTEC) 10 MG tablet, Take 1 tablet by mouth Daily., Disp: , Rfl:     fluticasone (FLONASE) 50 MCG/ACT nasal spray, , Disp: , Rfl:     glucose blood (ONE TOUCH ULTRA TEST) test strip, 1 bid, Disp: 100 each, Rfl: 5    hydroCHLOROthiazide 25 MG tablet, TAKE 1 TABLET BY MOUTH DAILY, Disp: 30 tablet, Rfl: 1    Januvia 100 MG tablet, TAKE 1 TABLET BY MOUTH DAILY, Disp: 90 tablet, Rfl: 0    meloxicam (MOBIC) 7.5 MG tablet, Take 1 tablet by  mouth Daily., Disp: , Rfl:     metFORMIN (GLUCOPHAGE) 500 MG tablet, TAKE 2 TABLETS BY MOUTH TWICE DAILY WITH FOOD, Disp: 360 tablet, Rfl: 2    Multiple Vitamins-Minerals (MULTIVITAMIN ADULT PO), Take 1 tablet by mouth Every Morning. To stop before surgery, Disp: , Rfl:     NIFEdipine XL (PROCARDIA XL) 60 MG 24 hr tablet, TAKE 1 TABLET BY MOUTH TWICE DAILY, Disp: 180 tablet, Rfl: 2    OneTouch Delica Lancets 33G misc, Test glucose daily, Disp: 100 each, Rfl: 5    rosuvastatin (CRESTOR) 10 MG tablet, TAKE 1 TABLET BY MOUTH EVERY 3 DAYS, Disp: 30 tablet, Rfl: 5    spironolactone (ALDACTONE) 25 MG tablet, TAKE 2 TABLETS BY MOUTH EVERY MORNING AND 2 TABLET EVERY EVENING and 2 at bedtime, Disp: 540 tablet, Rfl: 1    trospium 60 MG capsule sustained-release 24 hr capsule, Take 1 capsule by mouth Daily., Disp: , Rfl:     TURMERIC PO, Take  by mouth., Disp: , Rfl:     Unable to find, 1 each 1 (One) Time. Sea dan, Disp: , Rfl:      Review of System  Review of Systems   Constitutional:  Negative for unexpected weight loss.   Eyes:  Negative for blurred vision.   Endocrine: Negative for polydipsia and polyuria.   Neurological:  Negative for tremors, speech difficulty and confusion.     I have reviewed and confirmed the accuracy of the ROS as documented by the MA/LPN/RN Poncho Lee MD    Vitals:    03/26/25 1050   Pulse: 95   SpO2: 97%     Body mass index is 48.65 kg/m².    Objective     Physical Exam  Physical Exam  Constitutional:       General: She is not in acute distress.     Appearance: Normal appearance.   HENT:      Head: Normocephalic and atraumatic.   Cardiovascular:      Rate and Rhythm: Normal rate and regular rhythm.   Pulmonary:      Effort: Pulmonary effort is normal. No respiratory distress.      Breath sounds: Normal breath sounds. No wheezing, rhonchi or rales.   Neurological:      General: No focal deficit present.      Mental Status: She is alert and oriented to person, place, and time.   Psychiatric:          Mood and Affect: Mood normal.         Behavior: Behavior normal.         Thought Content: Thought content normal.         Judgment: Judgment normal.           Patient or patient representative verbalized consent for the use of Ambient Listening during the visit with  Poncho Lee MD for chart documentation. 3/26/2025  11:55 EDT    Poncho Lee MD  03/26/2025  Answers submitted by the patient for this visit:  Diabetes Questionnaire (Submitted on 3/25/2025)  Chief Complaint: Diabetes problem  Below 70: never

## 2025-04-08 RX ORDER — HYDROCHLOROTHIAZIDE 25 MG/1
25 TABLET ORAL DAILY
Qty: 30 TABLET | Refills: 4 | Status: SHIPPED | OUTPATIENT
Start: 2025-04-08

## 2025-05-13 ENCOUNTER — TELEPHONE (OUTPATIENT)
Dept: FAMILY MEDICINE CLINIC | Facility: CLINIC | Age: 65
End: 2025-05-13

## 2025-05-13 NOTE — TELEPHONE ENCOUNTER
Caller: Katharine Mitchell    Relationship: Self    Best call back number: [phone]     What medication are you requesting: ALLOPURINOL     What are your current symptoms: GOUT     Have you had these symptoms before:    [x] Yes  [] No    Have you been treated for these symptoms before:   [x] Yes  [] No    If a prescription is needed, what is your preferred pharmacy and phone number: [facility] [email or web address] #[id number] [facility] [address] AT [facility] [phone] [facility] [phone]      Additional notes:

## 2025-05-14 ENCOUNTER — OFFICE VISIT (OUTPATIENT)
Dept: FAMILY MEDICINE CLINIC | Facility: CLINIC | Age: 65
End: 2025-05-14
Payer: MEDICARE

## 2025-05-14 VITALS
DIASTOLIC BLOOD PRESSURE: 84 MMHG | OXYGEN SATURATION: 98 % | HEART RATE: 102 BPM | SYSTOLIC BLOOD PRESSURE: 124 MMHG | BODY MASS INDEX: 49.94 KG/M2 | WEIGHT: 271.4 LBS | HEIGHT: 62 IN

## 2025-05-14 DIAGNOSIS — M10.9 ACUTE GOUTY ARTHRITIS: ICD-10-CM

## 2025-05-14 DIAGNOSIS — E11.65 TYPE 2 DIABETES MELLITUS WITH HYPERGLYCEMIA, WITHOUT LONG-TERM CURRENT USE OF INSULIN: ICD-10-CM

## 2025-05-14 DIAGNOSIS — I10 ESSENTIAL HYPERTENSION: Primary | ICD-10-CM

## 2025-05-14 PROCEDURE — 1160F RVW MEDS BY RX/DR IN RCRD: CPT | Performed by: INTERNAL MEDICINE

## 2025-05-14 PROCEDURE — 1159F MED LIST DOCD IN RCRD: CPT | Performed by: INTERNAL MEDICINE

## 2025-05-14 PROCEDURE — 3044F HG A1C LEVEL LT 7.0%: CPT | Performed by: INTERNAL MEDICINE

## 2025-05-14 PROCEDURE — 1125F AMNT PAIN NOTED PAIN PRSNT: CPT | Performed by: INTERNAL MEDICINE

## 2025-05-14 PROCEDURE — 3079F DIAST BP 80-89 MM HG: CPT | Performed by: INTERNAL MEDICINE

## 2025-05-14 PROCEDURE — 99214 OFFICE O/P EST MOD 30 MIN: CPT | Performed by: INTERNAL MEDICINE

## 2025-05-14 PROCEDURE — 3074F SYST BP LT 130 MM HG: CPT | Performed by: INTERNAL MEDICINE

## 2025-05-14 RX ORDER — COLCHICINE 0.6 MG/1
TABLET ORAL
Qty: 4 TABLET | Refills: 0 | Status: SHIPPED | OUTPATIENT
Start: 2025-05-14

## 2025-05-14 NOTE — PROGRESS NOTES
05/14/2025    My Summary of Visit     This pleasant 64-year-old presents today for same-day appointment.  She relates that 2 weeks ago she noticed extreme pain in her left foot.  Had a prior history of gouty arthritis she felt that that was what was going on she started taking some over-the-counter remedies which was somewhat helpful.  She relates she was drinking tart cherry juice and applied ice to the foot she relates that for a day or so it was difficult for her to walk but that is improved.          In the office today the left hallux is of normal normal coloration but is warm to touch.  Mild tenderness is appreciated to palpation.  The right foot is not involved.    Her blood pressure shows good control at 124/84 in the left arm sitting position standard cuff.  Her weight is 271 pounds gain of 5 pounds from her last visit back on 3/26/2025.  Her last hemoglobin A1c back on 3/26 was within normal limits at 6.9.  We see no need to make any changes in medication at this point.  Assessment & Plan  1. Foot pain.  - Symptoms suggest a potential diagnosis of gout, characterized by warmth and soreness in the foot.  - The patient has been experiencing symptoms for about 2 weeks.  - A blood test will be conducted today to assess uric acid levels.  - Colchicine has been prescribed, with instructions to take 2 tablets initially, followed by 1 tablet after an hour. A fourth tablet is provided as a standby if needed. A brochure detailing foods to avoid has been given. If the blood test reveals elevated uric acid levels, allopurinol will be prescribed for continuous use to prevent future gout attacks.    Results    Assessment: I feel the patient is having a acute gouty episode that is resolving.    Plan:  1.)  Colchicine 0.6 mg 2 tablets now wait 1 hour and take 1 more.  2.)  Uric acid level  3.)  Keep follow-up in 4 to 6 weeks as already scheduled.           CC: Gout (Gout flare-up x 2 weeks)  .        HPI  Gout  Pertinent  negative symptoms include no chest pain, no chills, no cough, no fever, no nausea and no vomiting.    History of Present Illness  The patient presents for evaluation of foot pain.    She has been experiencing discomfort in her foot for approximately 2 weeks. The pain was more severe a week ago but has since subsided. She has been managing the pain with home remedies, including the application of ice and the consumption of tart cherry juice. Additionally, she has been using a topical magnesium spray oil. She reports that the foot feels hot to the touch, which is a hallmark of gout. She mentions that her father used to have similar symptoms.    FAMILY HISTORY  Her father used to have gout.    Subjective   Katharine Mitchell is a 64 y.o. female.      The following portions of the patient's history were reviewed and updated as appropriate: allergies, current medications, past family history, past medical history, past social history, past surgical history, and problem list.    Problem List  Patient Active Problem List   Diagnosis    Abnormal EKG    Essential hypertension    Preop cardiovascular exam    Osteoarthritis of right knee    Osteoarthritis of left knee    Type 2 diabetes mellitus    Class 3 obesity due to excess calories in adult    CAROLE (obstructive sleep apnea)    Screening for colon cancer    Polyp of transverse colon    Asthma    Diabetic peripheral neuropathy associated with type 2 diabetes mellitus    Inflammatory and toxic neuropathy    Endometrial hyperplasia with atypia    PMB (postmenopausal bleeding)    History of adenomatous polyp of colon       Past Medical History  Past Medical History:   Diagnosis Date    Abnormal EKG 01/09/2017    Allergic     Zestril    Arthritis     Asthma August 2020    Cholelithiasis 1986    Gallbladder removed    Diabetes mellitus     TYPE 2    Gout 2021    Hypertension     Knee pain     Neuropathy in diabetes 2021    Obesity     Pinched nerve in neck 06/30/2023    Sleep  apnea     DOES NOT WEAR CPAP    UTI (urinary tract infection)     TREATED FOR UTI JAN 2017       Surgical History  Past Surgical History:   Procedure Laterality Date    CHOLECYSTECTOMY      COLONOSCOPY      COLONOSCOPY N/A 01/06/2020    Procedure: COLONOSCOPY TO CECUM WITH COLD BIOPSY POLYPECTOMY AND 2 ML SALINE LIFT INJECTION;  Surgeon: Eva Rizvi MD;  Location: [facility] ENDOSCOPY;  Service: General    GALLBLADDER SURGERY      HYSTERECTOMY  10/04/2023    JOINT REPLACEMENT      KNEE ARTHROSCOPY Left     PILONIDAL CYSTECTOMY      IA ARTHRP KNE CONDYLE&PLATU MEDIAL&LAT COMPARTMENTS Right 02/20/2017    Procedure: RT TOTAL KNEE ARTHROPLASTY;  Surgeon: Lamont Morales MD;  Location: [facility] MAIN OR;  Service: Orthopedics    TOTAL KNEE ARTHROPLASTY Left 05/14/2018    Procedure: LEFT TOTAL KNEE ARTHROPLASTY;  Surgeon: Lamont Morales MD;  Location: [facility] MAIN OR;  Service: Orthopedics       Family History  Family History   Problem Relation Age of Onset    Arthritis Mother     Cancer Mother     Hypertension Mother     Kidney disease Mother     Hypertension Father     Stroke Father     Diabetes Paternal Grandmother     Malig Hyperthermia Neg Hx        Social History  Social History    Tobacco Use      Smoking status: Never        Passive exposure: Never      Smokeless tobacco: Never       Is the Patient a current tobacco user? No    Allergies  Allergies   Allergen Reactions    Lisinopril Other (See Comments), Swelling and Provider Review Needed     LIP/ MOUTH SWELLING       Current Medications    Current Outpatient Medications:     acetaminophen (TYLENOL) 500 MG tablet, Take 1 tablet by mouth Every 6 (Six) Hours As Needed for Mild Pain., Disp: 30 tablet, Rfl: 0    Advair Diskus 100-50 MCG/ACT DISKUS, INHALE 1 PUFF BY MOUTH EVERY 12 HOURS. RINSE MOUTH AFTER USE, Disp: , Rfl:     azelastine (ASTELIN) 0.1 % nasal spray, Administer 1 spray into the nostril(s) as directed by provider Every Evening., Disp: , Rfl:     Blood  Glucose Monitoring Suppl (ONE TOUCH ULTRA 2) W/DEVICE kit, , Disp: , Rfl:     cetirizine (zyrTEC) 10 MG tablet, Take 1 tablet by mouth Daily., Disp: , Rfl:     fluticasone (FLONASE) 50 MCG/ACT nasal spray, , Disp: , Rfl:     glucose blood (ONE TOUCH ULTRA TEST) test strip, 1 bid, Disp: 100 each, Rfl: 5    hydroCHLOROthiazide 25 MG tablet, TAKE 1 TABLET BY MOUTH DAILY, Disp: 30 tablet, Rfl: 4    Januvia 100 MG tablet, TAKE 1 TABLET BY MOUTH DAILY, Disp: 90 tablet, Rfl: 0    meloxicam (MOBIC) 7.5 MG tablet, Take 1 tablet by mouth Daily., Disp: , Rfl:     metFORMIN (GLUCOPHAGE) 500 MG tablet, TAKE 2 TABLETS BY MOUTH TWICE DAILY WITH FOOD, Disp: 360 tablet, Rfl: 2    Multiple Vitamins-Minerals (MULTIVITAMIN ADULT PO), Take 1 tablet by mouth Every Morning. To stop before surgery, Disp: , Rfl:     NIFEdipine XL (PROCARDIA XL) 60 MG 24 hr tablet, TAKE 1 TABLET BY MOUTH TWICE DAILY, Disp: 180 tablet, Rfl: 2    OneTouch Delica Lancets 33G misc, Test glucose daily, Disp: 100 each, Rfl: 5    rosuvastatin (CRESTOR) 10 MG tablet, TAKE 1 TABLET BY MOUTH EVERY 3 DAYS, Disp: 30 tablet, Rfl: 5    spironolactone (ALDACTONE) 25 MG tablet, TAKE 2 TABLETS BY MOUTH EVERY MORNING AND 2 TABLET EVERY EVENING and 2 at bedtime, Disp: 540 tablet, Rfl: 1    trospium 60 MG capsule sustained-release 24 hr capsule, Take 1 capsule by mouth Daily., Disp: , Rfl:     TURMERIC PO, Take  by mouth., Disp: , Rfl:     Unable to find, 1 each 1 (One) Time. Sea dan, Disp: , Rfl:     colchicine 0.6 MG tablet, Take 2 now wait 1 hour and take 1 more, Disp: 4 tablet, Rfl: 0     Review of System  Review of Systems   Constitutional:  Negative for chills and fever.   Respiratory:  Negative for cough and shortness of breath.    Cardiovascular:  Negative for chest pain and palpitations.   Gastrointestinal:  Negative for constipation, diarrhea, nausea and vomiting.   Musculoskeletal:  Positive for gout.   Neurological:  Negative for dizziness and headache.   I have  reviewed and confirmed the accuracy of the ROS as documented by the MA/LPN/RN Poncho Lee MD    Vitals:    05/14/25 0828   BP: 124/84   Pulse: 102   SpO2: 98%     Body mass index is 49.63 kg/m².    Objective     Physical Exam  Physical Exam  Constitutional:       General: She is not in acute distress.     Appearance: Normal appearance.   HENT:      Head: Normocephalic and atraumatic.   Cardiovascular:      Rate and Rhythm: Normal rate and regular rhythm.   Pulmonary:      Effort: Pulmonary effort is normal. No respiratory distress.      Breath sounds: Normal breath sounds. No wheezing, rhonchi or rales.   Neurological:      General: No focal deficit present.      Mental Status: She is alert and oriented to person, place, and time.   Psychiatric:         Mood and Affect: Mood normal.         Behavior: Behavior normal.         Thought Content: Thought content normal.         Judgment: Judgment normal.       Patient or patient representative verbalized consent for the use of Ambient Listening during the visit with  Poncho Lee MD for chart documentation. 5/14/2025  09:49 EDT    Poncho Lee MD  05/14/2025

## 2025-05-15 LAB — URATE SERPL-MCNC: 8.7 MG/DL (ref 2.4–5.7)

## 2025-05-16 ENCOUNTER — RESULTS FOLLOW-UP (OUTPATIENT)
Dept: FAMILY MEDICINE CLINIC | Facility: CLINIC | Age: 65
End: 2025-05-16
Payer: MEDICARE

## 2025-05-16 RX ORDER — ALLOPURINOL 300 MG/1
TABLET ORAL
Qty: 90 TABLET | Refills: 1 | Status: SHIPPED | OUTPATIENT
Start: 2025-05-16

## 2025-05-16 NOTE — LETTER
Katharine Mitchell  [address] [facility] [address]    May 16, 2025     Dear MsDelonte Mitchell:    Below are the results from your recent visit:    Resulted Orders   Uric acid   Result Value Ref Range    Uric Acid 8.7 (H) 2.4 - 5.7 mg/dL       The blood test to confirm that you have gouty arthritis medication has been started for you to take daily to help prevent these occurrences of acute gout       If you have any questions or concerns, please don't hesitate to call.         Sincerely,        Poncho Lee MD

## 2025-05-18 DIAGNOSIS — I10 ESSENTIAL HYPERTENSION: ICD-10-CM

## 2025-05-19 RX ORDER — NIFEDIPINE 60 MG/1
60 TABLET, EXTENDED RELEASE ORAL 2 TIMES DAILY
Qty: 180 TABLET | Refills: 2 | Status: SHIPPED | OUTPATIENT
Start: 2025-05-19

## 2025-05-19 RX ORDER — SITAGLIPTIN 100 MG/1
100 TABLET, FILM COATED ORAL DAILY
Qty: 90 TABLET | Refills: 0 | Status: SHIPPED | OUTPATIENT
Start: 2025-05-19

## 2025-05-22 ENCOUNTER — ANESTHESIA (OUTPATIENT)
Dept: GASTROENTEROLOGY | Facility: HOSPITAL | Age: 65
End: 2025-05-22
Payer: MEDICARE

## 2025-05-22 ENCOUNTER — HOSPITAL ENCOUNTER (OUTPATIENT)
Facility: HOSPITAL | Age: 65
Setting detail: HOSPITAL OUTPATIENT SURGERY
Discharge: HOME | End: 2025-05-22
Attending: SURGERY | Admitting: SURGERY
Payer: MEDICARE

## 2025-05-22 ENCOUNTER — ANESTHESIA EVENT (OUTPATIENT)
Dept: GASTROENTEROLOGY | Facility: HOSPITAL | Age: 65
End: 2025-05-22
Payer: MEDICARE

## 2025-05-22 VITALS
DIASTOLIC BLOOD PRESSURE: 85 MMHG | BODY MASS INDEX: 50.05 KG/M2 | OXYGEN SATURATION: 95 % | HEART RATE: 86 BPM | RESPIRATION RATE: 16 BRPM | SYSTOLIC BLOOD PRESSURE: 154 MMHG | HEIGHT: 62 IN | WEIGHT: 272 LBS

## 2025-05-22 DIAGNOSIS — Z86.0101 HISTORY OF ADENOMATOUS POLYP OF COLON: ICD-10-CM

## 2025-05-22 DIAGNOSIS — Z12.11 SCREENING FOR COLON CANCER: ICD-10-CM

## 2025-05-22 PROBLEM — K57.90 DIVERTICULOSIS: Status: ACTIVE | Noted: 2025-05-22

## 2025-05-22 PROBLEM — K63.5 COLON POLYPS: Status: ACTIVE | Noted: 2025-05-22

## 2025-05-22 LAB — GLUCOSE BLDC GLUCOMTR-MCNC: 159 MG/DL (ref 70–130)

## 2025-05-22 PROCEDURE — 25010000002 LIDOCAINE 2% SOLUTION: Performed by: NURSE ANESTHETIST, CERTIFIED REGISTERED

## 2025-05-22 PROCEDURE — 88305 TISSUE EXAM BY PATHOLOGIST: CPT | Performed by: SURGERY

## 2025-05-22 PROCEDURE — S0260 H&P FOR SURGERY: HCPCS | Performed by: SURGERY

## 2025-05-22 PROCEDURE — 25810000003 LACTATED RINGERS PER 1000 ML: Performed by: SURGERY

## 2025-05-22 PROCEDURE — 25010000002 GLYCOPYRROLATE 0.2 MG/ML SOLUTION: Performed by: NURSE ANESTHETIST, CERTIFIED REGISTERED

## 2025-05-22 PROCEDURE — 82948 REAGENT STRIP/BLOOD GLUCOSE: CPT

## 2025-05-22 PROCEDURE — 25010000002 PROPOFOL 10 MG/ML EMULSION: Performed by: NURSE ANESTHETIST, CERTIFIED REGISTERED

## 2025-05-22 PROCEDURE — 45380 COLONOSCOPY AND BIOPSY: CPT | Performed by: SURGERY

## 2025-05-22 RX ORDER — SODIUM CHLORIDE, SODIUM LACTATE, POTASSIUM CHLORIDE, CALCIUM CHLORIDE 600; 310; 30; 20 MG/100ML; MG/100ML; MG/100ML; MG/100ML
30 INJECTION, SOLUTION INTRAVENOUS CONTINUOUS PRN
Status: DISCONTINUED | OUTPATIENT
Start: 2025-05-22 | End: 2025-05-22 | Stop reason: HOSPADM

## 2025-05-22 RX ORDER — GLYCOPYRROLATE 0.2 MG/ML
INJECTION INTRAMUSCULAR; INTRAVENOUS AS NEEDED
Status: DISCONTINUED | OUTPATIENT
Start: 2025-05-22 | End: 2025-05-22 | Stop reason: SURG

## 2025-05-22 RX ORDER — LIDOCAINE HYDROCHLORIDE 20 MG/ML
INJECTION, SOLUTION INFILTRATION; PERINEURAL AS NEEDED
Status: DISCONTINUED | OUTPATIENT
Start: 2025-05-22 | End: 2025-05-22 | Stop reason: SURG

## 2025-05-22 RX ORDER — PROPOFOL 10 MG/ML
VIAL (ML) INTRAVENOUS AS NEEDED
Status: DISCONTINUED | OUTPATIENT
Start: 2025-05-22 | End: 2025-05-22 | Stop reason: SURG

## 2025-05-22 RX ADMIN — PROPOFOL 200 MCG/KG/MIN: 10 INJECTION, EMULSION INTRAVENOUS at 10:56

## 2025-05-22 RX ADMIN — PROPOFOL 150 MG: 10 INJECTION, EMULSION INTRAVENOUS at 10:55

## 2025-05-22 RX ADMIN — GLYCOPYRROLATE 0.2 MG: 0.2 INJECTION INTRAMUSCULAR; INTRAVENOUS at 11:11

## 2025-05-22 RX ADMIN — LIDOCAINE HYDROCHLORIDE 100 MG: 20 INJECTION, SOLUTION INFILTRATION; PERINEURAL at 10:53

## 2025-05-22 RX ADMIN — SODIUM CHLORIDE, POTASSIUM CHLORIDE, SODIUM LACTATE AND CALCIUM CHLORIDE: 600; 310; 30; 20 INJECTION, SOLUTION INTRAVENOUS at 10:50

## 2025-05-22 NOTE — H&P
General Surgery  History and Physical    CC: Screening for colon cancer    HPI: The patient is a pleasant 64 y.o. year-old lady who presents today for repeat screening colonoscopy.  Her last colonoscopy was done in 2020 by me and significant for internal hemorrhoids, diverticulosis, and a tubular adenoma.  A colonoscopy prior to that in 2015 was done by Dr. Khanna and For 1 polyp having been removed with no adenomatous growth to it..  She denies any melena or hematochezia and has no family history of colon cancer.    Past Medical History:   Hypertension  Type 2 diabetes  Obstructive sleep apnea  Morbid obesity (BMI 49)  History of adenomatous colon polyps  Osteoarthritis of the knees  Gout  Asthma    Past Surgical History:   Colonoscopy x2 (2020 by me-tubular adenoma, diverticulosis, internal hemorrhoids; 2015 by Dr. Khanna)  Excision right breast sebaceous cyst  Laparoscopic cholecystectomy  Bilateral knee arthroplasty  Pilonidal cystectomy/hysterectomy    Medications:   Medications Prior to Admission   Medication Sig Dispense Refill Last Dose/Taking    acetaminophen (TYLENOL) 500 MG tablet Take 1 tablet by mouth Every 6 (Six) Hours As Needed for Mild Pain. 30 tablet 0 Past Month    Advair Diskus 100-50 MCG/ACT DISKUS INHALE 1 PUFF BY MOUTH EVERY 12 HOURS. RINSE MOUTH AFTER USE   5/21/2025    azelastine (ASTELIN) 0.1 % nasal spray Administer 1 spray into the nostril(s) as directed by provider Every Evening.   5/21/2025    cetirizine (zyrTEC) 10 MG tablet Take 1 tablet by mouth Daily.   5/21/2025    colchicine 0.6 MG tablet Take 2 now wait 1 hour and take 1 more 4 tablet 0 Past Week    fluticasone (FLONASE) 50 MCG/ACT nasal spray    5/21/2025    hydroCHLOROthiazide 25 MG tablet TAKE 1 TABLET BY MOUTH DAILY 30 tablet 4 5/22/2025 Morning    Januvia 100 MG tablet TAKE 1 TABLET BY MOUTH DAILY 90 tablet 0 5/21/2025    meloxicam (MOBIC) 7.5 MG tablet Take 1 tablet by mouth Daily.   Past Week    metFORMIN (GLUCOPHAGE) 500  MG tablet TAKE 2 TABLETS BY MOUTH TWICE DAILY WITH FOOD 360 tablet 2 5/21/2025    Multiple Vitamins-Minerals (MULTIVITAMIN ADULT PO) Take 1 tablet by mouth Every Morning. To stop before surgery   5/21/2025    NIFEdipine XL (PROCARDIA XL) 60 MG 24 hr tablet TAKE 1 TABLET BY MOUTH TWICE DAILY 180 tablet 2 5/22/2025 Morning    rosuvastatin (CRESTOR) 10 MG tablet TAKE 1 TABLET BY MOUTH EVERY 3 DAYS 30 tablet 5 Past Week    spironolactone (ALDACTONE) 25 MG tablet TAKE 2 TABLETS BY MOUTH EVERY MORNING AND 2 TABLET EVERY EVENING and 2 at bedtime 540 tablet 1 5/21/2025    trospium 60 MG capsule sustained-release 24 hr capsule Take 1 capsule by mouth Daily.   5/21/2025    TURMERIC PO Take  by mouth.   Past Week    allopurinol (Zyloprim) 300 MG tablet Take 1 tablet daily.  Start after 5/25/2025 90 tablet 1     Blood Glucose Monitoring Suppl (ONE TOUCH ULTRA 2) W/DEVICE kit        glucose blood (ONE TOUCH ULTRA TEST) test strip 1 bid 100 each 5     OneTouch Delica Lancets 33G misc Test glucose daily 100 each 5     Unable to find 1 each 1 (One) Time. Sea dan          Allergies: Lisinopril (angioedema)    Family History: Mother with breast cancer, brother with prostate cancer, no family history of gastrointestinal malignancy    Social History: Single, retired, non-smoker, no regular alcohol use    ROS: A comprehensive review of systems was conducted and negative for melena or hematochezia  All other systems reviewed and negative    Physical Exam:  Vitals:    05/22/25 0947   BP: 165/81   Pulse: 98   Resp: 15   SpO2: 92%     Height: 157 cm  Weight: 123 kg  BMI: 49.75  General: No acute distress, well-nourished & well-developed  HEAD: normocephalic, atraumatic  EYES: normal conjunctiva, sclera anicteric  EARS: grossly normal hearing  NECK: supple, no thyromegaly  CARDIOVASCULAR: regular rate and rhythm  RESPIRATORY: clear to auscultation bilaterally  GASTROINTESTINAL: soft, nontender, non-distended  PSYCHIATRIC: oriented x3,  normal mood and affect    ASSESSMENT & PLAN  Ms. Mitchell is a 64-year-old lady here for repeat screening colonoscopy given a history of adenomatous colon polyps.  She has been counseled on the risks of colonoscopy today which include but are not limited to bleeding, colon perforation, and missed pathology.  Despite these risks, she has consented to proceed.    Eva Rizvi MD  General, Robotic, and Endoscopic Surgery  [facility], [facility]    [address], [address]  [place] [address]  P: [phone]  F: [phone]

## 2025-05-22 NOTE — ANESTHESIA PREPROCEDURE EVALUATION
Anesthesia Evaluation     Patient summary reviewed and Nursing notes reviewed   NPO Solid Status: > 8 hours  NPO Liquid Status: > 4 hours           Airway   Mallampati: II  Neck ROM: full  No difficulty expected  Dental - normal exam     Pulmonary     breath sounds clear to auscultation  (+) asthma,sleep apnea  Cardiovascular     (+) hypertension      Neuro/Psych  (+) numbness  GI/Hepatic/Renal/Endo    (+) obesity, morbid obesity, diabetes mellitus    ROS Comment: BMI 50, 271 lbs    Musculoskeletal     Abdominal   (+) obese   Substance History      OB/GYN          Other                    Anesthesia Plan    ASA 3     MAC     intravenous induction     Anesthetic plan, risks, benefits, and alternatives have been provided, discussed and informed consent has been obtained with: patient.    CODE STATUS:

## 2025-05-22 NOTE — DISCHARGE INSTRUCTIONS
For the rest of the day patient needs to be with a responsible adult.    For the rest of the day DO NOT work, drive, operate heavy machinery, drink alcohol, make important decisions or sign legal documents.    Advance to regular diet as tolerated, if your stomach is upset start with a light/bland diet.    Follow recommendations on procedure report if provided by your doctor.    Call Dr Rizvi for problems [phone]    If these problems occur come to ER: large amounts of bleeding, trouble breathing, repeated vomiting, severe unrelieved pain, fever or chills.

## 2025-05-22 NOTE — OP NOTE
Operative Note :  Eva Rizvi MD      Katharine Mitchell  1960    Procedure Date: 05/22/25    Pre-op Diagnosis:  Screening for colon cancer [Z12.11]  History of adenomatous polyp of colon [Z86.0101]    Post-Operative Diagnosis:  Colon polyps  Diverticulosis  Internal hemorrhoids    Procedure:   Flexible colonoscopy to the cecum cold forcep polypectomy x 2    Surgeon: Eva Rizvi MD    Assistant: None    Anesthesia:  MAC (monitored anesthetic care)    Estimated Blood Loss: Minimal    Specimens:   Transverse colon polyp  Descending colon polyp    Complications: None    Indications:  Ms. Mitchell is a 64-year-old lady here for repeat screening colonoscopy given a history of adenomatous colon polyps.  She has been counseled on the risks of colonoscopy today which include but are not limited to bleeding, colon perforation, and missed pathology.  Despite these risks, she has consented to proceed.    Findings: 2 sessile colon polyps removed, minimal diverticulosis, nonbleeding grade 1 internal hemorrhoids    Description of procedure:  The patient was brought to the endoscopy suite and brent in the left lateral decubitus position.  Continuous propofol anesthesia was administered.  A surgical timeout was completed.  A digital rectal exam was performed, revealing no abnormalities.  An adult colonoscope was then inserted through the anus and passed under direct visualization to the level of the cecum.  The cecum was identified via the ileocecal valve as well as the appendiceal orifice.  The scope was then slowly withdrawn, examining all circumferential walls of the ascending, transverse, descending, and sigmoid colon.  There was a 2 mm polyp of the transverse colon removed using the biopsy forceps.  There was minimal diverticulosis of the distal transverse colon with no evidence of bleeding.  Within the descending colon there was a 2 mm polyp removed using the biopsy forceps.  Within the rectum the scope was  retroflexed and showed nonbleeding grade 1 internal hemorrhoids.  The scope was then withdrawn and the colon desufflated.  The patient had a very good bowel prep and was transferred to the recovery area in stable condition.     Recommendations:  I will call the patient in 1 week or less with the pathology results of the 2 polyps removed as this will determine when the next screening colonoscopy will be due.    Eva Rizvi MD  General, Robotic, and Endoscopic Surgery  [facility], [facility]    [address], [address]  [place] [address]  P: [phone]  F: [phone]

## 2025-05-22 NOTE — ANESTHESIA POSTPROCEDURE EVALUATION
"Patient: Katharine Mitchell    Procedure Summary       Date: 05/22/25 Room / Location: [facility] ENDOSCOPY 6 / [facility] ENDOSCOPY    Anesthesia Start: 1050 Anesthesia Stop: 1123    Procedure: COLONOSCOPY INTO CECUM WITH HOT SNARE POLYPECTOMY Diagnosis:       Screening for colon cancer      History of adenomatous polyp of colon      (Screening for colon cancer [Z12.11])      (History of adenomatous polyp of colon [Z86.0101])    Surgeons: Eva Rizvi MD Provider: Rashid Kaminski MD    Anesthesia Type: MAC ASA Status: 3            Anesthesia Type: MAC    Vitals  Vitals Value Taken Time   /85 05/22/25 11:42   Temp     Pulse 83 05/22/25 11:46   Resp 16 05/22/25 11:41   SpO2 97 % 05/22/25 11:46   Vitals shown include unfiled device data.        Post Anesthesia Care and Evaluation    Patient location during evaluation: bedside  Patient participation: complete - patient participated  Level of consciousness: sleepy but conscious  Pain score: 0  Pain management: adequate    Airway patency: patent  Anesthetic complications: No anesthetic complications    Cardiovascular status: acceptable  Respiratory status: acceptable  Hydration status: acceptable    Comments: /85 (BP Location: Left arm, Patient Position: Lying)   Pulse 86   Resp 16   Ht 157.5 cm (62\")   Wt 123 kg (272 lb)   LMP 07/01/2016   SpO2 95%   BMI 49.75 kg/m²       "

## 2025-05-23 ENCOUNTER — TELEPHONE (OUTPATIENT)
Dept: SURGERY | Facility: CLINIC | Age: 65
End: 2025-05-23
Payer: MEDICARE

## 2025-05-23 NOTE — TELEPHONE ENCOUNTER
I called Katharine and discussed the benign pathology findings from her colonoscopy yesterday.  Both polyps returned as benign tubular adenomas and warrant a repeat screening colonoscopy in 5 years.  She expressed understanding.    Blanca, please update her health maintenance tab and recall pool to reflect a 5-year interval for colonoscopy.    Thanks,  FARAZ

## 2025-07-29 ENCOUNTER — OFFICE VISIT (OUTPATIENT)
Dept: FAMILY MEDICINE CLINIC | Facility: CLINIC | Age: 65
End: 2025-07-29
Payer: MEDICARE

## 2025-07-29 VITALS
SYSTOLIC BLOOD PRESSURE: 110 MMHG | BODY MASS INDEX: 49.5 KG/M2 | OXYGEN SATURATION: 100 % | DIASTOLIC BLOOD PRESSURE: 88 MMHG | HEART RATE: 97 BPM | WEIGHT: 269 LBS | HEIGHT: 62 IN

## 2025-07-29 DIAGNOSIS — E11.65 TYPE 2 DIABETES MELLITUS WITH HYPERGLYCEMIA, WITHOUT LONG-TERM CURRENT USE OF INSULIN: Primary | ICD-10-CM

## 2025-07-29 DIAGNOSIS — I10 ESSENTIAL HYPERTENSION: ICD-10-CM

## 2025-07-29 DIAGNOSIS — D50.9 IRON DEFICIENCY ANEMIA, UNSPECIFIED IRON DEFICIENCY ANEMIA TYPE: ICD-10-CM

## 2025-07-29 DIAGNOSIS — E78.5 HYPERLIPIDEMIA, UNSPECIFIED HYPERLIPIDEMIA TYPE: ICD-10-CM

## 2025-07-29 DIAGNOSIS — M10.9 ACUTE GOUTY ARTHRITIS: ICD-10-CM

## 2025-07-29 LAB
EXPIRATION DATE: ABNORMAL
HBA1C MFR BLD: 7.2 % (ref 4.5–5.7)
Lab: ABNORMAL

## 2025-07-29 PROCEDURE — 1160F RVW MEDS BY RX/DR IN RCRD: CPT | Performed by: INTERNAL MEDICINE

## 2025-07-29 PROCEDURE — 3079F DIAST BP 80-89 MM HG: CPT | Performed by: INTERNAL MEDICINE

## 2025-07-29 PROCEDURE — 1125F AMNT PAIN NOTED PAIN PRSNT: CPT | Performed by: INTERNAL MEDICINE

## 2025-07-29 PROCEDURE — 83036 HEMOGLOBIN GLYCOSYLATED A1C: CPT | Performed by: INTERNAL MEDICINE

## 2025-07-29 PROCEDURE — 3074F SYST BP LT 130 MM HG: CPT | Performed by: INTERNAL MEDICINE

## 2025-07-29 PROCEDURE — 1159F MED LIST DOCD IN RCRD: CPT | Performed by: INTERNAL MEDICINE

## 2025-07-29 PROCEDURE — 3051F HG A1C>EQUAL 7.0%<8.0%: CPT | Performed by: INTERNAL MEDICINE

## 2025-07-29 PROCEDURE — 99214 OFFICE O/P EST MOD 30 MIN: CPT | Performed by: INTERNAL MEDICINE

## 2025-07-29 RX ORDER — ALLOPURINOL 100 MG/1
TABLET ORAL
Qty: 60 TABLET | Refills: 2 | Status: SHIPPED | OUTPATIENT
Start: 2025-07-29

## 2025-07-29 NOTE — PROGRESS NOTES
07/29/2025    My Summary of Visit   This pleasant 64-year-old presents at this time for follow-up of several medical problems.  In the interim of visit she has had 2 acute episodes of acute gouty arthritis.  She currently is on Zyloprim 300 mg 1 tab p.o. daily and colchicine as needed.  She relates she is taking her medication as prescribed and pretty much has been following her diet although she admits to deviating just before her last acute gouty arthritis episode.    She also has a history of diabetes mellitus type 2 and is currently on metformin 500 mg 2 tablets twice a day plus Januvia 100 mg p.o. daily.  Her previous hemoglobin A1c was therapeutic at 6.94 months ago.    Her hemoglobin A1c today is 7.2.    Patient's uric acid level today is elevated at 8.7.    Note is made that she had a cortisone injection approximately 3 to 4 days ago into the left hip by orthopedic surgery and we discussed how this can adversely affect her glucose levels.      Assessment & Plan  1. Gouty arthritis.  - The patient reports no recent gout attacks.  - Physical exam shows limited mobility in the affected leg.  - Allopurinol dosage increased to 200 mg twice daily to prevent further gout attacks.  - Follow-up scheduled in 2 to 3 weeks to assess the need for additional dosage adjustments.    2. Diabetes mellitus.  - A1c level has decreased from 7.2 to 6.9, indicating improved control.  - Blood pressure is within normal range.  - No changes to current medication regimen; advised to make dietary modifications and reduce prednisone injections.  - Follow-up scheduled in 3 months to monitor diabetes control.    Follow-up: The patient will follow up in 2 to 3 weeks for gout management and in 3 months for diabetes control.    Results  Labs   - A1c: 6.9, last time was 7.2         Plan:  1.)  We will taper up her Zyloprim to 200 mg twice daily and see her back again in follow-up in the next several weeks for reevaluation and possibly  increase to 600 mg a day.  2.)  We will make no changes in her diabetic regimen at this point as I feel the cortisone injections made her glucose levels supratherapeutic and I think the patient can do a better job of being more compliant on her diet.    3.)  Her blood pressure shows good control at 110/88 left arm sitting position large cuff.       CC: Hypertension (F/U--bp hard to hear), Diabetes (F/U), and Gout (Follow up labs.)  .        HPI  Hypertension  Diabetes  Gout     History of Present Illness  The patient is a 64-year-old male who presents for evaluation of gouty arthritis and diabetes.    He has been experiencing gout attacks since his last visit. He reports that the initial gout attack affected his big toe. He has been managing these attacks by limiting his activity, which has resulted in a limp. He is currently on allopurinol 300 mg once daily.    He is on metformin 2 tablets twice a day.    Supplemental Information  He received a cortisone injection for his hip pain.    Subjective   Katharine Mitchell is a 64 y.o. female.      The following portions of the patient's history were reviewed and updated as appropriate: allergies, current medications, past family history, past medical history, past social history, past surgical history, and problem list.    Problem List  Patient Active Problem List   Diagnosis    Abnormal EKG    Essential hypertension    Preop cardiovascular exam    Osteoarthritis of right knee    Osteoarthritis of left knee    Type 2 diabetes mellitus    Class 3 obesity due to excess calories in adult    CAROLE (obstructive sleep apnea)    Screening for colon cancer    Polyp of transverse colon    Asthma    Diabetic peripheral neuropathy associated with type 2 diabetes mellitus    Inflammatory and toxic neuropathy    Endometrial hyperplasia with atypia    PMB (postmenopausal bleeding)    History of adenomatous polyp of colon    Colon polyps    Diverticulosis       Past Medical  History  Past Medical History:   Diagnosis Date    Abnormal EKG 01/09/2017    Allergic     Zestril    Arthritis     Asthma August 2020    Cholelithiasis 1986    Gallbladder removed    Diabetes mellitus     TYPE 2    Gout 2021    Hypertension     Knee pain     Neuropathy in diabetes 2021    Obesity     Pinched nerve in neck 06/30/2023    Sleep apnea     DOES NOT WEAR CPAP    UTI (urinary tract infection)     TREATED FOR UTI JAN 2017       Surgical History  Past Surgical History:   Procedure Laterality Date    CHOLECYSTECTOMY      COLONOSCOPY      COLONOSCOPY N/A 01/06/2020    Procedure: COLONOSCOPY TO CECUM WITH COLD BIOPSY POLYPECTOMY AND 2 ML SALINE LIFT INJECTION;  Surgeon: Eva Rizvi MD;  Location: [facility] ENDOSCOPY;  Service: General    COLONOSCOPY N/A 5/22/2025    Procedure: COLONOSCOPY INTO CECUM WITH HOT SNARE POLYPECTOMY;  Surgeon: Eva Rizvi MD;  Location: [facility] ENDOSCOPY;  Service: General;  Laterality: N/A;  PRE: SCREENING; PERSONAL H/O COLON POLYPS //  POST: POLYP, HEMORRHOIDS, DIVERTICULOSIS    GALLBLADDER SURGERY      HYSTERECTOMY  10/04/2023    JOINT REPLACEMENT      KNEE ARTHROSCOPY Left     PILONIDAL CYSTECTOMY      SD ARTHRP KNE CONDYLE&PLATU MEDIAL&LAT COMPARTMENTS Right 02/20/2017    Procedure: RT TOTAL KNEE ARTHROPLASTY;  Surgeon: Lamont Morales MD;  Location: [facility] MAIN OR;  Service: Orthopedics    TOTAL KNEE ARTHROPLASTY Left 05/14/2018    Procedure: LEFT TOTAL KNEE ARTHROPLASTY;  Surgeon: Lamont Morales MD;  Location: [facility] MAIN OR;  Service: Orthopedics       Family History  Family History   Problem Relation Age of Onset    Arthritis Mother     Cancer Mother     Hypertension Mother     Kidney disease Mother     Hypertension Father     Stroke Father     Diabetes Paternal Grandmother     Malig Hyperthermia Neg Hx        Social History  Social History    Tobacco Use      Smoking status: Never        Passive exposure: Never      Smokeless tobacco: Never       Is the Patient a  current tobacco user? No    Allergies  Allergies   Allergen Reactions    Lisinopril Other (See Comments), Swelling and Provider Review Needed     LIP/ MOUTH SWELLING       Current Medications    Current Outpatient Medications:     acetaminophen (TYLENOL) 500 MG tablet, Take 1 tablet by mouth Every 6 (Six) Hours As Needed for Mild Pain., Disp: 30 tablet, Rfl: 0    Advair Diskus 100-50 MCG/ACT DISKUS, INHALE 1 PUFF BY MOUTH EVERY 12 HOURS. RINSE MOUTH AFTER USE, Disp: , Rfl:     allopurinol (Zyloprim) 300 MG tablet, Take 1 tablet daily.  Start after 5/25/2025, Disp: 90 tablet, Rfl: 1    azelastine (ASTELIN) 0.1 % nasal spray, Administer 1 spray into the nostril(s) as directed by provider Every Evening., Disp: , Rfl:     Blood Glucose Monitoring Suppl (ONE TOUCH ULTRA 2) W/DEVICE kit, , Disp: , Rfl:     cetirizine (zyrTEC) 10 MG tablet, Take 1 tablet by mouth Daily., Disp: , Rfl:     fluticasone (FLONASE) 50 MCG/ACT nasal spray, , Disp: , Rfl:     glucose blood (ONE TOUCH ULTRA TEST) test strip, 1 bid, Disp: 100 each, Rfl: 5    hydroCHLOROthiazide 25 MG tablet, TAKE 1 TABLET BY MOUTH DAILY, Disp: 30 tablet, Rfl: 4    Januvia 100 MG tablet, TAKE 1 TABLET BY MOUTH DAILY, Disp: 90 tablet, Rfl: 0    meloxicam (MOBIC) 7.5 MG tablet, Take 1 tablet by mouth Daily., Disp: , Rfl:     metFORMIN (GLUCOPHAGE) 500 MG tablet, TAKE 2 TABLETS BY MOUTH TWICE DAILY WITH FOOD, Disp: 360 tablet, Rfl: 2    Multiple Vitamins-Minerals (MULTIVITAMIN ADULT PO), Take 1 tablet by mouth Every Morning. To stop before surgery, Disp: , Rfl:     NIFEdipine XL (PROCARDIA XL) 60 MG 24 hr tablet, TAKE 1 TABLET BY MOUTH TWICE DAILY, Disp: 180 tablet, Rfl: 2    OneTouch Delica Lancets 33G misc, Test glucose daily, Disp: 100 each, Rfl: 5    rosuvastatin (CRESTOR) 10 MG tablet, TAKE 1 TABLET BY MOUTH EVERY 3 DAYS, Disp: 30 tablet, Rfl: 5    spironolactone (ALDACTONE) 25 MG tablet, TAKE 2 TABLETS BY MOUTH EVERY MORNING AND 2 TABLET EVERY EVENING and 2 at  bedtime, Disp: 540 tablet, Rfl: 1    trospium 60 MG capsule sustained-release 24 hr capsule, Take 1 capsule by mouth Daily., Disp: , Rfl:     TURMERIC PO, Take  by mouth., Disp: , Rfl:     Unable to find, 1 each 1 (One) Time. Sea dan, Disp: , Rfl:      Review of System  Review of Systems   Musculoskeletal:  Positive for gout.     I have reviewed and confirmed the accuracy of the ROS as documented by the MA/LPN/RN Poncho Lee MD    Vitals:    07/29/25 1057   BP: 110/88   Pulse: 97   SpO2: 100%     Body mass index is 49.2 kg/m².    Objective     Physical Exam  Physical Exam  Vitals and nursing note reviewed.   Constitutional:       General: She is not in acute distress.     Appearance: Normal appearance. She is well-developed.   HENT:      Head: Normocephalic and atraumatic.   Eyes:      Conjunctiva/sclera: Conjunctivae normal.   Cardiovascular:      Rate and Rhythm: Normal rate and regular rhythm.      Heart sounds: Normal heart sounds.   Pulmonary:      Effort: Pulmonary effort is normal. No respiratory distress.      Breath sounds: Normal breath sounds. No wheezing, rhonchi or rales.   Abdominal:      General: Bowel sounds are normal.      Palpations: Abdomen is soft.   Musculoskeletal:         General: Normal range of motion.      Cervical back: Normal range of motion and neck supple.   Skin:     General: Skin is warm and dry.   Neurological:      General: No focal deficit present.      Mental Status: She is alert and oriented to person, place, and time.   Psychiatric:         Mood and Affect: Mood normal.         Behavior: Behavior normal.         Thought Content: Thought content normal.         Judgment: Judgment normal.           Patient or patient representative verbalized consent for the use of Ambient Listening during the visit with  Poncho Lee MD for chart documentation. 7/29/2025  11:42 EDT    Poncho Lee MD  07/29/2025  Answers submitted by the patient for this visit:  Diabetes  Questionnaire (Submitted on 7/28/2025)  Chief Complaint: Diabetes problem  Below 70: never

## 2025-08-01 ENCOUNTER — TELEPHONE (OUTPATIENT)
Dept: FAMILY MEDICINE CLINIC | Facility: CLINIC | Age: 65
End: 2025-08-01
Payer: MEDICARE

## 2025-08-01 RX ORDER — ALLOPURINOL 200 MG/1
TABLET ORAL
Qty: 60 TABLET | Refills: 0 | Status: SHIPPED | OUTPATIENT
Start: 2025-08-01

## 2025-08-14 ENCOUNTER — TELEPHONE (OUTPATIENT)
Dept: FAMILY MEDICINE CLINIC | Facility: CLINIC | Age: 65
End: 2025-08-14
Payer: MEDICARE

## 2025-08-15 ENCOUNTER — OFFICE VISIT (OUTPATIENT)
Dept: FAMILY MEDICINE CLINIC | Facility: CLINIC | Age: 65
End: 2025-08-15
Payer: MEDICARE

## 2025-08-15 VITALS — HEART RATE: 106 BPM | HEIGHT: 62 IN | WEIGHT: 266 LBS | OXYGEN SATURATION: 97 % | BODY MASS INDEX: 48.95 KG/M2

## 2025-08-15 DIAGNOSIS — E79.0 HYPERURICEMIA: ICD-10-CM

## 2025-08-15 DIAGNOSIS — Z13.820 SCREENING FOR OSTEOPOROSIS: ICD-10-CM

## 2025-08-15 DIAGNOSIS — E11.65 TYPE 2 DIABETES MELLITUS WITH HYPERGLYCEMIA, WITHOUT LONG-TERM CURRENT USE OF INSULIN: Primary | Chronic | ICD-10-CM

## 2025-08-16 LAB
MICROALBUMIN UR-MCNC: 251.1 UG/ML
URATE SERPL-MCNC: 2.3 MG/DL (ref 3–7.2)

## 2025-08-18 RX ORDER — SITAGLIPTIN 100 MG/1
100 TABLET, FILM COATED ORAL DAILY
Qty: 90 TABLET | Refills: 0 | Status: SHIPPED | OUTPATIENT
Start: 2025-08-18

## 2025-08-19 ENCOUNTER — RESULTS FOLLOW-UP (OUTPATIENT)
Dept: FAMILY MEDICINE CLINIC | Facility: CLINIC | Age: 65
End: 2025-08-19
Payer: MEDICARE

## (undated) DEVICE — GLV SURG BIOGEL LTX PF 8

## (undated) DEVICE — SOL ISO/ALC RUB 70PCT 4OZ

## (undated) DEVICE — APPL CHLORAPREP W/TINT 26ML ORNG

## (undated) DEVICE — PK KN TOTL 40

## (undated) DEVICE — DUAL CUT SAGITTAL BLADE

## (undated) DEVICE — STRAP STIRUP SLP RNG 19X3.5IN DISP

## (undated) DEVICE — MEDICINE CUP, GRADUATED, STER: Brand: MEDLINE

## (undated) DEVICE — GLV SURG SENSICARE W/ALOE PF LF 8 STRL

## (undated) DEVICE — BNDG ELAS ELITE V/CLOSE 6IN 5YD LF STRL

## (undated) DEVICE — INTENDED FOR TISSUE SEPARATION, AND OTHER PROCEDURES THAT REQUIRE A SHARP SURGICAL BLADE TO PUNCTURE OR CUT.: Brand: BARD-PARKER ® CARBON RIB-BACK BLADES

## (undated) DEVICE — TOWEL,OR,DSP,ST,BLUE,STD,4/PK,20PK/CS: Brand: MEDLINE

## (undated) DEVICE — GLV SURG TRIUMPH CLASSIC PF LTX 8 STRL

## (undated) DEVICE — SENSR O2 OXIMAX FNGR A/ 18IN NONSTR

## (undated) DEVICE — ENCORE® LATEX ORTHO SIZE 8, STERILE LATEX POWDER-FREE SURGICAL GLOVE: Brand: ENCORE

## (undated) DEVICE — ANTIBACTERIAL UNDYED BRAIDED (POLYGLACTIN 910), SYNTHETIC ABSORBABLE SUTURE: Brand: COATED VICRYL

## (undated) DEVICE — BNDG ELAS ELITE V/CLOSE 4IN 5YD LF STRL

## (undated) DEVICE — PREP SOL POVIDONE/IODINE BT 4OZ

## (undated) DEVICE — SUT MNCRYL 3/0 PS2 18IN MCP497G

## (undated) DEVICE — ENCORE® LATEX ORTHO SIZE 8.5, STERILE LATEX POWDER-FREE SURGICAL GLOVE: Brand: ENCORE

## (undated) DEVICE — GOWN ,SIRUS,NONREINFORCED SMALL: Brand: MEDLINE

## (undated) DEVICE — TRY SKINPREP DRYPREP

## (undated) DEVICE — ADHESIVE ISLAND DRESSING: Brand: TELFA

## (undated) DEVICE — TUBING, SUCTION, 1/4" X 10', STRAIGHT: Brand: MEDLINE

## (undated) DEVICE — ADHS SKIN DERMABOND TOP ADVANCED

## (undated) DEVICE — CANN NASL CO2 TRULINK W/O2 A/

## (undated) DEVICE — DRSNG TELFA PAD NONADH STR 1S 3X8IN

## (undated) DEVICE — THE SINGLE USE ETRAP – POLYP TRAP IS USED FOR SUCTION RETRIEVAL OF ENDOSCOPICALLY REMOVED POLYPS.: Brand: ETRAP

## (undated) DEVICE — 2108 SERIES SAGITTAL BLADE (19.5 X 1.27 X 81.0MM)

## (undated) DEVICE — SINGLE-USE BIOPSY FORCEPS: Brand: RADIAL JAW 4

## (undated) DEVICE — ADAPT CLN BIOGUARD AIR/H2O DISP

## (undated) DEVICE — SYR LUERLOK 50ML

## (undated) DEVICE — DRAPE,U/ SHT,SPLIT,PLAS,STERIL: Brand: MEDLINE

## (undated) DEVICE — DRSNG SURESITE123 4X10IN

## (undated) DEVICE — THE TORRENT IRRIGATION SCOPE CONNECTOR IS USED WITH THE TORRENT IRRIGATION TUBING TO PROVIDE IRRIGATION FLUIDS SUCH AS STERILE WATER DURING GASTROINTESTINAL ENDOSCOPIC PROCEDURES WHEN USED IN CONJUNCTION WITH AN IRRIGATION PUMP (OR ELECTROSURGICAL UNIT).: Brand: TORRENT

## (undated) DEVICE — KT VLV BIOGUARD SXN BIOP AIR/H20 CONN 4PC DISP

## (undated) DEVICE — KT ORCA ORCAPOD DISP STRL

## (undated) DEVICE — MSK PROC CURAPLEX O2 2/ADAPT 7FT

## (undated) DEVICE — GOWN,SIRUS,NON REINFRCD,LARGE,SET IN SL: Brand: MEDLINE

## (undated) DEVICE — THE CARR-LOCKE INJECTION NEEDLE IS A SINGLE USE, DISPOSABLE, FLEXIBLE SHEATH INJECTION NEEDLE USED FOR THE INJECTION OF VARIOUS TYPES OF MEDIA THROUGH FLEXIBLE ENDOSCOPES.

## (undated) DEVICE — GLV SURG BIOGEL LTX PF 8 1/2

## (undated) DEVICE — 3M™ IOBAN™ 2 ANTIMICROBIAL INCISE DRAPE 6650EZ: Brand: IOBAN™ 2

## (undated) DEVICE — ADHS SKIN DERMABOND

## (undated) DEVICE — DRSNG TELFA PAD NONADH STR 1S 3X4IN

## (undated) DEVICE — SNAR POLYP SENSATION STDOVL 27 240 BX40